# Patient Record
Sex: FEMALE | Race: WHITE
[De-identification: names, ages, dates, MRNs, and addresses within clinical notes are randomized per-mention and may not be internally consistent; named-entity substitution may affect disease eponyms.]

---

## 2017-03-01 ENCOUNTER — APPOINTMENT (OUTPATIENT)
Dept: RHEUMATOLOGY | Facility: CLINIC | Age: 77
End: 2017-03-01

## 2017-03-01 VITALS
WEIGHT: 148 LBS | BODY MASS INDEX: 23.78 KG/M2 | SYSTOLIC BLOOD PRESSURE: 140 MMHG | HEIGHT: 66 IN | DIASTOLIC BLOOD PRESSURE: 70 MMHG

## 2017-03-01 RX ORDER — DENOSUMAB 60 MG/ML
60 INJECTION SUBCUTANEOUS
Qty: 1 | Refills: 0 | Status: COMPLETED | OUTPATIENT
Start: 2017-03-01

## 2017-03-01 RX ADMIN — DENOSUMAB 0 MG/ML: 60 INJECTION SUBCUTANEOUS at 00:00

## 2017-04-05 ENCOUNTER — APPOINTMENT (OUTPATIENT)
Dept: OBGYN | Facility: CLINIC | Age: 77
End: 2017-04-05

## 2017-04-18 ENCOUNTER — APPOINTMENT (OUTPATIENT)
Dept: OBGYN | Facility: CLINIC | Age: 77
End: 2017-04-18

## 2017-09-08 ENCOUNTER — APPOINTMENT (OUTPATIENT)
Dept: RHEUMATOLOGY | Facility: CLINIC | Age: 77
End: 2017-09-08
Payer: MEDICARE

## 2017-09-08 VITALS
HEIGHT: 66 IN | HEART RATE: 69 BPM | WEIGHT: 153 LBS | SYSTOLIC BLOOD PRESSURE: 148 MMHG | DIASTOLIC BLOOD PRESSURE: 77 MMHG | BODY MASS INDEX: 24.59 KG/M2

## 2017-09-08 PROCEDURE — 96372 THER/PROPH/DIAG INJ SC/IM: CPT

## 2017-09-08 RX ORDER — DENOSUMAB 60 MG/ML
60 INJECTION SUBCUTANEOUS
Qty: 0 | Refills: 0 | Status: COMPLETED | OUTPATIENT
Start: 2017-09-08

## 2017-09-08 RX ADMIN — DENOSUMAB 0 MG/ML: 60 INJECTION SUBCUTANEOUS at 00:00

## 2018-03-16 ENCOUNTER — APPOINTMENT (OUTPATIENT)
Dept: RHEUMATOLOGY | Facility: CLINIC | Age: 78
End: 2018-03-16
Payer: MEDICARE

## 2018-03-16 VITALS
OXYGEN SATURATION: 98 % | HEIGHT: 66 IN | HEART RATE: 68 BPM | DIASTOLIC BLOOD PRESSURE: 82 MMHG | SYSTOLIC BLOOD PRESSURE: 134 MMHG

## 2018-03-16 PROCEDURE — 99214 OFFICE O/P EST MOD 30 MIN: CPT | Mod: 25

## 2018-03-16 PROCEDURE — 96372 THER/PROPH/DIAG INJ SC/IM: CPT

## 2018-03-16 RX ORDER — DENOSUMAB 60 MG/ML
60 INJECTION SUBCUTANEOUS
Qty: 0 | Refills: 0 | Status: COMPLETED | OUTPATIENT
Start: 2018-03-16

## 2018-03-16 RX ORDER — LOSARTAN POTASSIUM 100 MG/1
100 TABLET, FILM COATED ORAL
Qty: 90 | Refills: 0 | Status: ACTIVE | COMMUNITY
Start: 2018-01-27

## 2018-03-16 RX ORDER — RANITIDINE 150 MG/1
150 TABLET ORAL
Qty: 90 | Refills: 0 | Status: ACTIVE | COMMUNITY
Start: 2017-12-20

## 2018-03-16 RX ADMIN — DENOSUMAB 0 MG/ML: 60 INJECTION SUBCUTANEOUS at 00:00

## 2018-03-17 ENCOUNTER — TRANSCRIPTION ENCOUNTER (OUTPATIENT)
Age: 78
End: 2018-03-17

## 2018-04-10 ENCOUNTER — TRANSCRIPTION ENCOUNTER (OUTPATIENT)
Age: 78
End: 2018-04-10

## 2018-05-01 ENCOUNTER — CHART COPY (OUTPATIENT)
Age: 78
End: 2018-05-01

## 2018-05-14 ENCOUNTER — RX RENEWAL (OUTPATIENT)
Age: 78
End: 2018-05-14

## 2018-05-23 ENCOUNTER — FORM ENCOUNTER (OUTPATIENT)
Age: 78
End: 2018-05-23

## 2018-05-24 ENCOUNTER — APPOINTMENT (OUTPATIENT)
Dept: NUCLEAR MEDICINE | Facility: CLINIC | Age: 78
End: 2018-05-24

## 2018-05-24 ENCOUNTER — OUTPATIENT (OUTPATIENT)
Dept: OUTPATIENT SERVICES | Facility: HOSPITAL | Age: 78
LOS: 1 days | End: 2018-05-24
Payer: MEDICARE

## 2018-05-24 DIAGNOSIS — Z00.8 ENCOUNTER FOR OTHER GENERAL EXAMINATION: ICD-10-CM

## 2018-05-24 PROCEDURE — A9552: CPT

## 2018-05-24 PROCEDURE — 78816 PET IMAGE W/CT FULL BODY: CPT

## 2018-05-24 PROCEDURE — 78816 PET IMAGE W/CT FULL BODY: CPT | Mod: 26,PI

## 2018-05-31 NOTE — ASU PATIENT PROFILE, ADULT - PMH
Chronic back pain, unspecified back location, unspecified back pain laterality    Closed compression fracture of second lumbar vertebra, sequela    Hyperlipidemia, unspecified hyperlipidemia type    Hypertension, unspecified type    Spinal stenosis of lumbar region, unspecified whether neurogenic claudication present

## 2018-06-01 ENCOUNTER — RESULT REVIEW (OUTPATIENT)
Age: 78
End: 2018-06-01

## 2018-06-01 ENCOUNTER — OUTPATIENT (OUTPATIENT)
Dept: OUTPATIENT SERVICES | Facility: HOSPITAL | Age: 78
LOS: 1 days | Discharge: HOME | End: 2018-06-01
Payer: MEDICARE

## 2018-06-01 VITALS
DIASTOLIC BLOOD PRESSURE: 52 MMHG | RESPIRATION RATE: 14 BRPM | WEIGHT: 160.94 LBS | HEIGHT: 67 IN | SYSTOLIC BLOOD PRESSURE: 142 MMHG | HEART RATE: 57 BPM | TEMPERATURE: 98 F

## 2018-06-01 VITALS
HEART RATE: 64 BPM | RESPIRATION RATE: 16 BRPM | SYSTOLIC BLOOD PRESSURE: 136 MMHG | TEMPERATURE: 97 F | DIASTOLIC BLOOD PRESSURE: 65 MMHG | OXYGEN SATURATION: 98 %

## 2018-06-01 DIAGNOSIS — Z92.241 PERSONAL HISTORY OF SYSTEMIC STEROID THERAPY: Chronic | ICD-10-CM

## 2018-06-01 DIAGNOSIS — Z98.890 OTHER SPECIFIED POSTPROCEDURAL STATES: Chronic | ICD-10-CM

## 2018-06-01 LAB
INR BLD: 1 RATIO — SIGNIFICANT CHANGE UP (ref 0.88–1.16)
PROTHROM AB SERPL-ACNC: 10.8 SEC — SIGNIFICANT CHANGE UP (ref 9.8–12.7)

## 2018-06-01 PROCEDURE — 93010 ELECTROCARDIOGRAM REPORT: CPT

## 2018-06-01 PROCEDURE — 88172 CYTP DX EVAL FNA 1ST EA SITE: CPT | Mod: 26

## 2018-06-01 PROCEDURE — 20225 BONE BIOPSY TROCAR/NDL DEEP: CPT

## 2018-06-01 PROCEDURE — 88189 FLOWCYTOMETRY/READ 16 & >: CPT

## 2018-06-01 PROCEDURE — 77012 CT SCAN FOR NEEDLE BIOPSY: CPT | Mod: 26

## 2018-06-01 PROCEDURE — 88311 DECALCIFY TISSUE: CPT | Mod: 26

## 2018-06-01 PROCEDURE — 88342 IMHCHEM/IMCYTCHM 1ST ANTB: CPT | Mod: 26

## 2018-06-01 PROCEDURE — 88307 TISSUE EXAM BY PATHOLOGIST: CPT | Mod: 26

## 2018-06-01 PROCEDURE — 88173 CYTOPATH EVAL FNA REPORT: CPT | Mod: 26

## 2018-06-01 RX ORDER — ACETAMINOPHEN 500 MG
1000 TABLET ORAL ONCE
Qty: 0 | Refills: 0 | Status: COMPLETED | OUTPATIENT
Start: 2018-06-01 | End: 2018-06-01

## 2018-06-01 RX ORDER — SODIUM CHLORIDE 9 MG/ML
1000 INJECTION INTRAMUSCULAR; INTRAVENOUS; SUBCUTANEOUS
Qty: 0 | Refills: 0 | Status: DISCONTINUED | OUTPATIENT
Start: 2018-06-01 | End: 2018-06-01

## 2018-06-01 RX ORDER — ONDANSETRON 8 MG/1
4 TABLET, FILM COATED ORAL ONCE
Qty: 0 | Refills: 0 | Status: DISCONTINUED | OUTPATIENT
Start: 2018-06-01 | End: 2018-06-01

## 2018-06-01 RX ORDER — OXYCODONE HYDROCHLORIDE 5 MG/1
5 TABLET ORAL EVERY 4 HOURS
Qty: 0 | Refills: 0 | Status: DISCONTINUED | OUTPATIENT
Start: 2018-06-01 | End: 2018-06-01

## 2018-06-01 RX ORDER — FENTANYL CITRATE 50 UG/ML
50 INJECTION INTRAVENOUS
Qty: 0 | Refills: 0 | Status: DISCONTINUED | OUTPATIENT
Start: 2018-06-01 | End: 2018-06-01

## 2018-06-01 RX ADMIN — Medication 400 MILLIGRAM(S): at 13:46

## 2018-06-01 NOTE — BRIEF OPERATIVE NOTE - OPERATION/FINDINGS
ct guidance, oncontrol coaxial system. two cores. only 5cc marrow aspirate from lesion could be obtained. pt nelia proc well. intraop touchpreps not particularly helpful given sclerotic nature of cores.

## 2018-06-06 LAB — TM INTERPRETATION: SIGNIFICANT CHANGE UP

## 2018-06-07 LAB — SURGICAL PATHOLOGY FINAL REPORT - CH: SIGNIFICANT CHANGE UP

## 2018-06-11 DIAGNOSIS — Z80.3 FAMILY HISTORY OF MALIGNANT NEOPLASM OF BREAST: ICD-10-CM

## 2018-06-11 DIAGNOSIS — R93.7 ABNORMAL FINDINGS ON DIAGNOSTIC IMAGING OF OTHER PARTS OF MUSCULOSKELETAL SYSTEM: ICD-10-CM

## 2018-06-11 DIAGNOSIS — M54.9 DORSALGIA, UNSPECIFIED: ICD-10-CM

## 2018-06-11 DIAGNOSIS — F41.9 ANXIETY DISORDER, UNSPECIFIED: ICD-10-CM

## 2018-06-11 DIAGNOSIS — I10 ESSENTIAL (PRIMARY) HYPERTENSION: ICD-10-CM

## 2018-06-11 DIAGNOSIS — E78.5 HYPERLIPIDEMIA, UNSPECIFIED: ICD-10-CM

## 2018-06-11 DIAGNOSIS — Z87.891 PERSONAL HISTORY OF NICOTINE DEPENDENCE: ICD-10-CM

## 2018-06-11 DIAGNOSIS — M48.061 SPINAL STENOSIS, LUMBAR REGION WITHOUT NEUROGENIC CLAUDICATION: ICD-10-CM

## 2018-06-11 DIAGNOSIS — C90.00 MULTIPLE MYELOMA NOT HAVING ACHIEVED REMISSION: ICD-10-CM

## 2018-06-11 DIAGNOSIS — M89.9 DISORDER OF BONE, UNSPECIFIED: ICD-10-CM

## 2018-06-11 DIAGNOSIS — M85.80 OTHER SPECIFIED DISORDERS OF BONE DENSITY AND STRUCTURE, UNSPECIFIED SITE: ICD-10-CM

## 2018-06-11 DIAGNOSIS — D64.9 ANEMIA, UNSPECIFIED: ICD-10-CM

## 2018-06-14 LAB — CHROM ANALY OVERALL INTERP SPEC-IMP: SIGNIFICANT CHANGE UP

## 2018-07-03 ENCOUNTER — INPATIENT (INPATIENT)
Facility: HOSPITAL | Age: 78
LOS: 3 days | Discharge: NURSING, REHAB OR LONG-TERM CARE | End: 2018-07-07
Attending: INTERNAL MEDICINE | Admitting: INTERNAL MEDICINE
Payer: MEDICARE

## 2018-07-03 VITALS — WEIGHT: 149.91 LBS

## 2018-07-03 DIAGNOSIS — Z98.890 OTHER SPECIFIED POSTPROCEDURAL STATES: Chronic | ICD-10-CM

## 2018-07-03 DIAGNOSIS — Z92.241 PERSONAL HISTORY OF SYSTEMIC STEROID THERAPY: Chronic | ICD-10-CM

## 2018-07-03 PROBLEM — I10 ESSENTIAL (PRIMARY) HYPERTENSION: Chronic | Status: ACTIVE | Noted: 2018-05-31

## 2018-07-03 PROBLEM — E78.5 HYPERLIPIDEMIA, UNSPECIFIED: Chronic | Status: ACTIVE | Noted: 2018-05-31

## 2018-07-03 PROBLEM — M54.9 DORSALGIA, UNSPECIFIED: Chronic | Status: ACTIVE | Noted: 2018-05-31

## 2018-07-03 PROBLEM — S32.020S: Chronic | Status: ACTIVE | Noted: 2018-05-31

## 2018-07-03 PROBLEM — M48.061 SPINAL STENOSIS, LUMBAR REGION WITHOUT NEUROGENIC CLAUDICATION: Chronic | Status: ACTIVE | Noted: 2018-05-31

## 2018-07-03 LAB
ALBUMIN SERPL ELPH-MCNC: 3.6 G/DL — SIGNIFICANT CHANGE UP (ref 3.3–5)
ALP SERPL-CCNC: 114 U/L — SIGNIFICANT CHANGE UP (ref 40–120)
ALT FLD-CCNC: 26 U/L — SIGNIFICANT CHANGE UP (ref 12–78)
ANION GAP SERPL CALC-SCNC: 5 MMOL/L — SIGNIFICANT CHANGE UP (ref 5–17)
APPEARANCE UR: CLEAR — SIGNIFICANT CHANGE UP
AST SERPL-CCNC: 15 U/L — SIGNIFICANT CHANGE UP (ref 15–37)
BACTERIA # UR AUTO: ABNORMAL
BASOPHILS # BLD AUTO: 0.04 K/UL — SIGNIFICANT CHANGE UP (ref 0–0.2)
BASOPHILS NFR BLD AUTO: 0.4 % — SIGNIFICANT CHANGE UP (ref 0–2)
BILIRUB SERPL-MCNC: 0.4 MG/DL — SIGNIFICANT CHANGE UP (ref 0.2–1.2)
BILIRUB UR-MCNC: NEGATIVE — SIGNIFICANT CHANGE UP
BUN SERPL-MCNC: 24 MG/DL — HIGH (ref 7–23)
CALCIUM SERPL-MCNC: 9.1 MG/DL — SIGNIFICANT CHANGE UP (ref 8.5–10.1)
CHLORIDE SERPL-SCNC: 108 MMOL/L — SIGNIFICANT CHANGE UP (ref 96–108)
CO2 SERPL-SCNC: 26 MMOL/L — SIGNIFICANT CHANGE UP (ref 22–31)
COLOR SPEC: YELLOW — SIGNIFICANT CHANGE UP
CREAT SERPL-MCNC: 1.14 MG/DL — SIGNIFICANT CHANGE UP (ref 0.5–1.3)
DIFF PNL FLD: ABNORMAL
EOSINOPHIL # BLD AUTO: 1.01 K/UL — HIGH (ref 0–0.5)
EOSINOPHIL NFR BLD AUTO: 10.3 % — HIGH (ref 0–6)
EPI CELLS # UR: SIGNIFICANT CHANGE UP
GLUCOSE SERPL-MCNC: 87 MG/DL — SIGNIFICANT CHANGE UP (ref 70–99)
GLUCOSE UR QL: NEGATIVE MG/DL — SIGNIFICANT CHANGE UP
HCT VFR BLD CALC: 31.4 % — LOW (ref 34.5–45)
HGB BLD-MCNC: 10.4 G/DL — LOW (ref 11.5–15.5)
IMM GRANULOCYTES NFR BLD AUTO: 0.4 % — SIGNIFICANT CHANGE UP (ref 0–1.5)
KETONES UR-MCNC: NEGATIVE — SIGNIFICANT CHANGE UP
LEUKOCYTE ESTERASE UR-ACNC: ABNORMAL
LYMPHOCYTES # BLD AUTO: 1.83 K/UL — SIGNIFICANT CHANGE UP (ref 1–3.3)
LYMPHOCYTES # BLD AUTO: 18.7 % — SIGNIFICANT CHANGE UP (ref 13–44)
MCHC RBC-ENTMCNC: 31 PG — SIGNIFICANT CHANGE UP (ref 27–34)
MCHC RBC-ENTMCNC: 33.1 GM/DL — SIGNIFICANT CHANGE UP (ref 32–36)
MCV RBC AUTO: 93.5 FL — SIGNIFICANT CHANGE UP (ref 80–100)
MONOCYTES # BLD AUTO: 1.06 K/UL — HIGH (ref 0–0.9)
MONOCYTES NFR BLD AUTO: 10.8 % — SIGNIFICANT CHANGE UP (ref 2–14)
NEUTROPHILS # BLD AUTO: 5.79 K/UL — SIGNIFICANT CHANGE UP (ref 1.8–7.4)
NEUTROPHILS NFR BLD AUTO: 59.4 % — SIGNIFICANT CHANGE UP (ref 43–77)
NITRITE UR-MCNC: POSITIVE
NRBC # BLD: 0 /100 WBCS — SIGNIFICANT CHANGE UP (ref 0–0)
PH UR: 8 — SIGNIFICANT CHANGE UP (ref 5–8)
PLATELET # BLD AUTO: 298 K/UL — SIGNIFICANT CHANGE UP (ref 150–400)
POTASSIUM SERPL-MCNC: 4.5 MMOL/L — SIGNIFICANT CHANGE UP (ref 3.5–5.3)
POTASSIUM SERPL-SCNC: 4.5 MMOL/L — SIGNIFICANT CHANGE UP (ref 3.5–5.3)
PROT SERPL-MCNC: 7.5 GM/DL — SIGNIFICANT CHANGE UP (ref 6–8.3)
PROT UR-MCNC: NEGATIVE MG/DL — SIGNIFICANT CHANGE UP
RBC # BLD: 3.36 M/UL — LOW (ref 3.8–5.2)
RBC # FLD: 13 % — SIGNIFICANT CHANGE UP (ref 10.3–14.5)
RBC CASTS # UR COMP ASSIST: ABNORMAL /HPF (ref 0–4)
SODIUM SERPL-SCNC: 139 MMOL/L — SIGNIFICANT CHANGE UP (ref 135–145)
SP GR SPEC: 1.01 — SIGNIFICANT CHANGE UP (ref 1.01–1.02)
UROBILINOGEN FLD QL: NEGATIVE MG/DL — SIGNIFICANT CHANGE UP
WBC # BLD: 9.77 K/UL — SIGNIFICANT CHANGE UP (ref 3.8–10.5)
WBC # FLD AUTO: 9.77 K/UL — SIGNIFICANT CHANGE UP (ref 3.8–10.5)
WBC UR QL: >50

## 2018-07-03 PROCEDURE — 72192 CT PELVIS W/O DYE: CPT | Mod: 26

## 2018-07-03 PROCEDURE — 93010 ELECTROCARDIOGRAM REPORT: CPT

## 2018-07-03 PROCEDURE — 72148 MRI LUMBAR SPINE W/O DYE: CPT | Mod: 26

## 2018-07-03 PROCEDURE — 73502 X-RAY EXAM HIP UNI 2-3 VIEWS: CPT | Mod: 26

## 2018-07-03 PROCEDURE — 99285 EMERGENCY DEPT VISIT HI MDM: CPT

## 2018-07-03 PROCEDURE — 72131 CT LUMBAR SPINE W/O DYE: CPT | Mod: 26

## 2018-07-03 PROCEDURE — 76377 3D RENDER W/INTRP POSTPROCES: CPT | Mod: 26

## 2018-07-03 PROCEDURE — 73552 X-RAY EXAM OF FEMUR 2/>: CPT | Mod: 26

## 2018-07-03 RX ORDER — SODIUM CHLORIDE 9 MG/ML
1000 INJECTION INTRAMUSCULAR; INTRAVENOUS; SUBCUTANEOUS ONCE
Qty: 0 | Refills: 0 | Status: COMPLETED | OUTPATIENT
Start: 2018-07-03 | End: 2018-07-03

## 2018-07-03 RX ORDER — ACETAMINOPHEN 500 MG
1000 TABLET ORAL ONCE
Qty: 0 | Refills: 0 | Status: COMPLETED | OUTPATIENT
Start: 2018-07-03 | End: 2018-07-03

## 2018-07-03 RX ORDER — CEFTRIAXONE 500 MG/1
1000 INJECTION, POWDER, FOR SOLUTION INTRAMUSCULAR; INTRAVENOUS ONCE
Qty: 0 | Refills: 0 | Status: COMPLETED | OUTPATIENT
Start: 2018-07-03 | End: 2018-07-03

## 2018-07-03 RX ORDER — GABAPENTIN 400 MG/1
0 CAPSULE ORAL
Qty: 0 | Refills: 0 | COMMUNITY

## 2018-07-03 RX ORDER — SODIUM CHLORIDE 9 MG/ML
3 INJECTION INTRAMUSCULAR; INTRAVENOUS; SUBCUTANEOUS EVERY 8 HOURS
Qty: 0 | Refills: 0 | Status: DISCONTINUED | OUTPATIENT
Start: 2018-07-03 | End: 2018-07-03

## 2018-07-03 RX ORDER — MORPHINE SULFATE 50 MG/1
2 CAPSULE, EXTENDED RELEASE ORAL ONCE
Qty: 0 | Refills: 0 | Status: DISCONTINUED | OUTPATIENT
Start: 2018-07-03 | End: 2018-07-03

## 2018-07-03 RX ORDER — RANITIDINE HYDROCHLORIDE 150 MG/1
150 TABLET, FILM COATED ORAL
Qty: 0 | Refills: 0 | COMMUNITY

## 2018-07-03 RX ORDER — ONDANSETRON 8 MG/1
4 TABLET, FILM COATED ORAL ONCE
Qty: 0 | Refills: 0 | Status: COMPLETED | OUTPATIENT
Start: 2018-07-03 | End: 2018-07-03

## 2018-07-03 RX ORDER — DEXAMETHASONE 0.5 MG/5ML
6 ELIXIR ORAL ONCE
Qty: 0 | Refills: 0 | Status: COMPLETED | OUTPATIENT
Start: 2018-07-03 | End: 2018-07-03

## 2018-07-03 RX ORDER — METOPROLOL TARTRATE 50 MG
1 TABLET ORAL
Qty: 0 | Refills: 0 | COMMUNITY

## 2018-07-03 RX ADMIN — SODIUM CHLORIDE 500 MILLILITER(S): 9 INJECTION INTRAMUSCULAR; INTRAVENOUS; SUBCUTANEOUS at 15:13

## 2018-07-03 RX ADMIN — MORPHINE SULFATE 2 MILLIGRAM(S): 50 CAPSULE, EXTENDED RELEASE ORAL at 14:35

## 2018-07-03 RX ADMIN — MORPHINE SULFATE 2 MILLIGRAM(S): 50 CAPSULE, EXTENDED RELEASE ORAL at 14:14

## 2018-07-03 RX ADMIN — ONDANSETRON 4 MILLIGRAM(S): 8 TABLET, FILM COATED ORAL at 14:13

## 2018-07-03 RX ADMIN — CEFTRIAXONE 1000 MILLIGRAM(S): 500 INJECTION, POWDER, FOR SOLUTION INTRAMUSCULAR; INTRAVENOUS at 16:43

## 2018-07-03 RX ADMIN — Medication 400 MILLIGRAM(S): at 15:12

## 2018-07-03 RX ADMIN — Medication 1000 MILLIGRAM(S): at 16:37

## 2018-07-03 RX ADMIN — Medication 6 MILLIGRAM(S): at 14:06

## 2018-07-03 RX ADMIN — SODIUM CHLORIDE 3 MILLILITER(S): 9 INJECTION INTRAMUSCULAR; INTRAVENOUS; SUBCUTANEOUS at 14:06

## 2018-07-03 RX ADMIN — Medication 1 MILLIGRAM(S): at 17:11

## 2018-07-03 NOTE — H&P ADULT - ASSESSMENT
76 y/o F PMHx significant for HTN, hyperlipidemia, chronic back pain 2/2 lumbar spinal stenosis, L2 compression fracture, presents to the ED for further evaluation of inability to ambulate due to severe lower back pain that radiates down the right leg. The patient states that her symptoms worsened acutely last night (7/2). The patient states that her symptoms subside when seated. She states that she is currently being "worked up" for "some suspicious lesions in her pelvis" and is s/p IR bone biopsy 6/1/18. The patient states that her workup thus far has been inconclusive. Additionally the patient states that she has had increased urinary urgency recently. 76 y/o F PMHx significant for HTN, hyperlipidemia, chronic back pain 2/2 lumbar spinal stenosis, L2 compression fracture, presents to the ED for further evaluation of inability to ambulate due to severe lower back pain that radiates down the right leg. The patient states that her symptoms worsened acutely last night (7/2). The patient states that her symptoms subside when seated. She states that she is currently being "worked up" for "some suspicious lesions in her pelvis" and is s/p IR bone biopsy 6/1/18. The patient states that her workup thus far has been inconclusive. Additionally the patient states that she has had increased urinary urgency recently.     #Bilateral sacral insufficiency fractures L>R   #Chronic pubic insufficiency fx  #Subacute fracture of RIGHT transverse process of L5  #Severe canal stenosis at L1-L2 level  #Multilevel degenerative disc disease and spondylosis at L1-2, L2-3, L4-S1    ~admit to Medicine  ~cont. pain management   ~f/u w/ Ortho-Spine  ~PT evaluation    2)HTN  ~cont. Losartan 100mg po daily  ~cont. Metoprolol 25mg po tid    3)Hyperlipidemia  ~cont. Atorvastatin 40mg po qHS    4)Vte ppx  ~cont. Heparin sq

## 2018-07-03 NOTE — H&P ADULT - NSHPOUTPATIENTPROVIDERS_GEN_ALL_CORE
Hematology/Oncology; Dr. Batista   Rheumatology; Dr. Rojas  Cardiology; Dr. Quezada PCP/Cardiology; Dr. Quezada  Hematology/Oncology; Dr. Batista   Rheumatology; Dr. Rojas

## 2018-07-03 NOTE — ED STATDOCS - CONSTITUTIONAL, MLM
normal... well appearing, well nourished, and in no apparent distress. Non ambulatory unless with assistance.

## 2018-07-03 NOTE — ED STATDOCS - ATTENDING CONTRIBUTION TO CARE
I, Radhames Heart MD, personally saw the patient with ACP.  I have personally performed a face to face diagnostic evaluation on this patient.  I have reviewed the ACP note and agree with the history, exam, and plan of care, except as noted.

## 2018-07-03 NOTE — ED ADULT TRIAGE NOTE - CHIEF COMPLAINT QUOTE
Pt c/o back pain that radiates to the buttocks and down the right leg since last night. pt states she has hx of spinal stenosis,  and spinal fractures. pt states she does not want to take narcotics

## 2018-07-03 NOTE — H&P ADULT - NSHPPHYSICALEXAM_GEN_ALL_CORE
Vital Signs Last 24 Hrs  T(C): 36.4 (03 Jul 2018 22:07), Max: 36.9 (03 Jul 2018 12:57)  T(F): 97.6 (03 Jul 2018 22:07), Max: 98.5 (03 Jul 2018 12:57)  HR: 69 (03 Jul 2018 22:07) (60 - 69)  BP: 150/69 (03 Jul 2018 22:07) (150/69 - 176/68)  RR: 16 (03 Jul 2018 22:07) (16 - 18)  SpO2: 100% (03 Jul 2018 22:07) (100% - 100%)

## 2018-07-03 NOTE — ED STATDOCS - CARE PLAN
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

## 2018-07-03 NOTE — ED STATDOCS - MUSCULOSKELETAL, MLM
4/5 B/L LE weakness. Lumbar TTP. Hip TTP. Suprapubic TTP. No saddle anesthesia. 4/5 B/L LE weakness. Lumbar TTP. Hip TTP. Suprapubic TTP. No saddle anesthesia.  DTRs absent.  No sensory deficits in the REBECCA or perianal area.

## 2018-07-03 NOTE — ED STATDOCS - MEDICAL DECISION MAKING DETAILS
78 y/o female with chronic back pain, HTN, HLD, spinal stenosis, pelvic fx with pelvic lesions presents to the ED c/o back pain due to chronic spinal stenosis and pelvic fx. DDx core compression. Plan for MRI Lumbar spine and likely admit. 76 y/o female with chronic back pain, HTN, HLD, spinal stenosis, pelvic fx with pelvic lesions presents to the ED c/o back pain due to chronic spinal stenosis and pelvic fx.  Exam reveals B/L REBECCA weakness. DDx cord compression, sciatica, nerve root compression, DJD, metastatic disease.  Plan for CT then MRI Lumbar spine, steroids, spine consultation, and admit.

## 2018-07-03 NOTE — ED STATDOCS - OBJECTIVE STATEMENT
78 y/o female with chronic back pain, HTN, HLD, spinal stenosis, pelvic fx with pelvic lesions presents to the ED c/o back pain due to chronic spinal stenosis and pelvic fx. Pt has been experiencing increasing need to urinate with retention abd pain. Also c/o of a sciatic like pain radiating down to the right hip. Pt has had MRI and bone biopsy for the pelvic fx and possible lesions. Decreased ambulation worsening over the last week. Pt lives alone and feels like she cannot be at home by herself when she is not able to ambulate. Dr. Gonzales- Onc. Nonsmoker. No Etoh.

## 2018-07-03 NOTE — CONSULT NOTE ADULT - SUBJECTIVE AND OBJECTIVE BOX
77y Female community ambulator presents c/o inability to ambulate due to pain in her back and down her right leg. She has had pain for some time, but recently got worse to the point she cannot walk. She does not have pain while sitting usually however last night she says the pain was the worst ever. She has been using a walker but now is having trouble even using that. Pain also in the right groin area. She was worked up with a IR bone biopsy 6/1/18 for some suspicious lesions in her pelvis and for insufficiency fractures of the sacral ala. She has also been managed by Dr. Isidro and Dr. Rojas (Rheumatology) and sees Dr. Quezada for her hx HTN. Per pt, her bone workup for the lesions were inconclusive. Notably she recalls her mother had Padgets disease. At baseline prior to this shpt was able to walk unassisted and perform gardening. Now she cannot. No trauma recently. Denies numbness/tingling. Denies fever/chills. She wishes to avoid any and all surgery for her back or pelvis.    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, initial encounter  H/O prior ablation treatment  Status post epidural steroid injection  BACK PAIN  Inability to walk    PAST MEDICAL & SURGICAL HISTORY:  Spinal stenosis of lumbar region, unspecified whether neurogenic claudication present  Hypertension, unspecified type  Hyperlipidemia, unspecified hyperlipidemia type  Closed compression fracture of second lumbar vertebra, sequela  Chronic back pain, unspecified back location, unspecified back pain laterality  H/O prior ablation treatment: lumbar area  Status post epidural steroid injection: lumbar    MEDICATIONS  (STANDING):    Allergies    No Known Allergies    Intolerances                            10.4   9.77  )-----------( 298      ( 03 Jul 2018 14:05 )             31.4     03 Jul 2018 14:05    139    |  108    |  24     ----------------------------<  87     4.5     |  26     |  1.14     Ca    9.1        03 Jul 2018 14:05    TPro  7.5    /  Alb  3.6    /  TBili  0.4    /  DBili  x      /  AST  15     /  ALT  26     /  AlkPhos  114    03 Jul 2018 14:05      Vital Signs Last 24 Hrs  T(C): 36.9 (07-03-18 @ 12:57), Max: 36.9 (07-03-18 @ 12:57)  T(F): 98.5 (07-03-18 @ 12:57), Max: 98.5 (07-03-18 @ 12:57)  HR: 60 (07-03-18 @ 12:57) (60 - 60)  BP: 176/68 (07-03-18 @ 12:57) (176/68 - 176/68)  BP(mean): --  RR: 18 (07-03-18 @ 12:57) (18 - 18)  SpO2: 100% (07-03-18 @ 12:57) (100% - 100%)    Imaging:  CT Pelvis Bony Only No Cont  PROCEDURE DATE:  07/03/2018      INTERPRETATION:      CT lumbar spine and pelviswithout IV contrast        Evaluation of the bony pelvis demonstrates progression of heterogeneous   lytic lesion involving the LEFT sacral analog with an underlying   insufficiency fracture that has progressed. No heterogeneous lucent   lesion in the RIGHT sacral area LEFT is noted with a new nondisplaced   insufficiency fracture. There is progression of the heterogeneous   sclerotic/lytic lesion in the RIGHT iliac bone with underlying known   insufficiency fracture. Progression of myelomatous involvement of the   BILATERAL pubic bones extending into the symphysis pubis with underlying   known subacute fractures.      Physical Exam  General: NAD, Alert, Awake and oriented, Able to sit up and lay back without pain  Neurologic: No gross deficits, moving all 4s.  Head: NCAT  Eyes: PERRL  Neck/C-Spine: FAROM.   T/L Spine: No bony TTP, no palpable step-off.    HIPS and PELVIS: Able to SLR both Hips.     RIGHT LE:  Right lateral upper thigh vaguely tender to palpation. No open skin. No deformities or other signs of trauma at hip, knee, lower leg, ankle or foot. Full baseline painless ROM at Hip, Knee, ankle and toes. Negative log-roll and heel strike. No groin or hip  pain on Passive internal or external rotation Able to actively SLR. SILT toes 1-5. + DP/PT pulses palpable with brisk cap refill distally. EHL/FHL/TA/GS intact symmetric bilaterally.    LEFT LE:  No open skin. No deformities or other signs of trauma at hip, knee, lower leg, ankle or foot. Full baseline painless ROM at Hip, Knee, ankle and toes. Negative log-roll and heel strike. No groin or hip  pain on Passive internal or external rotation Able to actively SLR. SILT toes 1-5. + DP/PT pulses palpable with brisk cap refill distally. EHL/FHL/TA/GS intact symmetric bilaterally.    RIGHT UE: No open skin. Full baseline painless ROM at shoulder, elbow, wrist, and . No bony TTP.    LEFT UE: Circular skin lesions x2 appear to be ecchymotic on dorsum of left forearm, approx 1x1cm. No open skin. Full baseline painless ROM at shoulder, elbow, wrist, and . No bony TTP.

## 2018-07-03 NOTE — H&P ADULT - HISTORY OF PRESENT ILLNESS
78 y/o F PMHx significant for HTN, hyperlipidemia, chronic back pain 2/2 lumbar spinal stenosis, L2 compression fracture, presents to the ED for further evaluation of inability to ambulate due to severe lower back pain that radiates down the right leg. The patient states that her symptoms worsened acutely last night (7/2). The patient states that her symptoms subside when seated. She states that she is currently being "worked up" for "some suspicious lesions in her pelvis" and is s/p IR bone biopsy 6/1/18. The patient states that her workup thus far has been inconclusive. Additionally the patient states that she has had increased urinary urgency recently.     CT Pelvis => revealed progression of heterogeneous lytic lesion involving the LEFT sacral analog with an underlying insufficiency fracture that has progressed. No heterogeneous lucent lesion in the RIGHT sacral area LEFT is noted with a new nondisplaced insufficiency fracture. There is progression of the heterogeneous sclerotic/lytic lesion in the RIGHT iliac bone with underlying known insufficiency fracture. 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    CT Lumbar spine: No acute fracture. Subacute fracture of RIGHT transverse process of L5.  Multilevel degenerative disc disease and spondylosis at L1-2, L2-3, L4-S1 with disc bulges are noted at L1-2 through L5-S1 which flatten the ventral thecal sac and narrow the BILATERAL neural foramina. Mild central stenosis noted at L1-2, severe central stenosis at L2-3 and L3-4, moderate central stenosis at L4-5 and L5-S1 on a degenerative basis.    MR LS Spine => 1.  Severe canal stenosis at L1-L2 level secondary to 10 mm retropulsed fragment from chronic compression fracture and associated 10 mm disc bulge.  2.  Degenerative disc disease in lower lumbar spine.   3.  Abnormal bone marrow signal consistent with edema seen in sacrum bilaterally more prominent on the left which is incompletely visualized.  Findings correspond to areas of sclerosis with sacral fractures seen on CT study.    Family Hx of Padget's disease

## 2018-07-03 NOTE — ED STATDOCS - NS_ ATTENDINGSCRIBEDETAILS _ED_A_ED_FT
The scribe's documentation has been prepared under my direction and personally reviewed by me in its entirety.  I confirm that the note above accurately reflects all my work, treatment, procedures, and decision making except where otherwise noted or amended by me.  Radhames Heart M.D.

## 2018-07-03 NOTE — ED ADULT NURSE NOTE - OBJECTIVE STATEMENT
pt presents to ED with chronic lower back pain, pt stats pain worsened in last week with diff ambulating secondary to pain. pt c/o frequent urination and urinary retention at times. afebrile. breathing is even and unlabored. will continue to monitor and reassess

## 2018-07-04 LAB
ALBUMIN SERPL ELPH-MCNC: 3.2 G/DL — LOW (ref 3.3–5)
ALP SERPL-CCNC: 98 U/L — SIGNIFICANT CHANGE UP (ref 40–120)
ALT FLD-CCNC: 18 U/L — SIGNIFICANT CHANGE UP (ref 12–78)
ANION GAP SERPL CALC-SCNC: 6 MMOL/L — SIGNIFICANT CHANGE UP (ref 5–17)
AST SERPL-CCNC: 14 U/L — LOW (ref 15–37)
BASOPHILS # BLD AUTO: 0.02 K/UL — SIGNIFICANT CHANGE UP (ref 0–0.2)
BASOPHILS NFR BLD AUTO: 0.2 % — SIGNIFICANT CHANGE UP (ref 0–2)
BILIRUB SERPL-MCNC: 0.2 MG/DL — SIGNIFICANT CHANGE UP (ref 0.2–1.2)
BUN SERPL-MCNC: 25 MG/DL — HIGH (ref 7–23)
CALCIUM SERPL-MCNC: 8.5 MG/DL — SIGNIFICANT CHANGE UP (ref 8.5–10.1)
CHLORIDE SERPL-SCNC: 110 MMOL/L — HIGH (ref 96–108)
CO2 SERPL-SCNC: 24 MMOL/L — SIGNIFICANT CHANGE UP (ref 22–31)
CREAT SERPL-MCNC: 1.03 MG/DL — SIGNIFICANT CHANGE UP (ref 0.5–1.3)
CULTURE RESULTS: SIGNIFICANT CHANGE UP
EOSINOPHIL # BLD AUTO: 0.1 K/UL — SIGNIFICANT CHANGE UP (ref 0–0.5)
EOSINOPHIL NFR BLD AUTO: 1.2 % — SIGNIFICANT CHANGE UP (ref 0–6)
ERYTHROCYTE [SEDIMENTATION RATE] IN BLOOD: 58 MM/HR — HIGH (ref 0–20)
GLUCOSE SERPL-MCNC: 110 MG/DL — HIGH (ref 70–99)
HCT VFR BLD CALC: 27.5 % — LOW (ref 34.5–45)
HGB BLD-MCNC: 9 G/DL — LOW (ref 11.5–15.5)
IMM GRANULOCYTES NFR BLD AUTO: 0.4 % — SIGNIFICANT CHANGE UP (ref 0–1.5)
LDH SERPL L TO P-CCNC: 202 U/L — SIGNIFICANT CHANGE UP (ref 84–241)
LYMPHOCYTES # BLD AUTO: 1.49 K/UL — SIGNIFICANT CHANGE UP (ref 1–3.3)
LYMPHOCYTES # BLD AUTO: 17.8 % — SIGNIFICANT CHANGE UP (ref 13–44)
MAGNESIUM SERPL-MCNC: 2.1 MG/DL — SIGNIFICANT CHANGE UP (ref 1.6–2.6)
MCHC RBC-ENTMCNC: 30.3 PG — SIGNIFICANT CHANGE UP (ref 27–34)
MCHC RBC-ENTMCNC: 32.7 GM/DL — SIGNIFICANT CHANGE UP (ref 32–36)
MCV RBC AUTO: 92.6 FL — SIGNIFICANT CHANGE UP (ref 80–100)
MONOCYTES # BLD AUTO: 1.35 K/UL — HIGH (ref 0–0.9)
MONOCYTES NFR BLD AUTO: 16.2 % — HIGH (ref 2–14)
NEUTROPHILS # BLD AUTO: 5.36 K/UL — SIGNIFICANT CHANGE UP (ref 1.8–7.4)
NEUTROPHILS NFR BLD AUTO: 64.2 % — SIGNIFICANT CHANGE UP (ref 43–77)
NRBC # BLD: 0 /100 WBCS — SIGNIFICANT CHANGE UP (ref 0–0)
PLATELET # BLD AUTO: 274 K/UL — SIGNIFICANT CHANGE UP (ref 150–400)
POTASSIUM SERPL-MCNC: 4.2 MMOL/L — SIGNIFICANT CHANGE UP (ref 3.5–5.3)
POTASSIUM SERPL-SCNC: 4.2 MMOL/L — SIGNIFICANT CHANGE UP (ref 3.5–5.3)
PROT SERPL-MCNC: 6.5 GM/DL — SIGNIFICANT CHANGE UP (ref 6–8.3)
RBC # BLD: 2.95 M/UL — LOW (ref 3.8–5.2)
RBC # BLD: 2.97 M/UL — LOW (ref 3.8–5.2)
RBC # FLD: 13.2 % — SIGNIFICANT CHANGE UP (ref 10.3–14.5)
RETICS #: 54.9 K/UL — SIGNIFICANT CHANGE UP (ref 25–125)
RETICS/RBC NFR: 1.9 % — SIGNIFICANT CHANGE UP (ref 0.5–2.5)
SODIUM SERPL-SCNC: 140 MMOL/L — SIGNIFICANT CHANGE UP (ref 135–145)
SPECIMEN SOURCE: SIGNIFICANT CHANGE UP
VIT B12 SERPL-MCNC: 671 PG/ML — SIGNIFICANT CHANGE UP (ref 232–1245)
WBC # BLD: 8.35 K/UL — SIGNIFICANT CHANGE UP (ref 3.8–10.5)
WBC # FLD AUTO: 8.35 K/UL — SIGNIFICANT CHANGE UP (ref 3.8–10.5)

## 2018-07-04 RX ORDER — SENNA PLUS 8.6 MG/1
2 TABLET ORAL AT BEDTIME
Qty: 0 | Refills: 0 | Status: DISCONTINUED | OUTPATIENT
Start: 2018-07-04 | End: 2018-07-07

## 2018-07-04 RX ORDER — SERTRALINE 25 MG/1
50 TABLET, FILM COATED ORAL AT BEDTIME
Qty: 0 | Refills: 0 | Status: DISCONTINUED | OUTPATIENT
Start: 2018-07-04 | End: 2018-07-07

## 2018-07-04 RX ORDER — ALPRAZOLAM 0.25 MG
0.5 TABLET ORAL
Qty: 0 | Refills: 0 | Status: DISCONTINUED | OUTPATIENT
Start: 2018-07-04 | End: 2018-07-07

## 2018-07-04 RX ORDER — CEFTRIAXONE 500 MG/1
1000 INJECTION, POWDER, FOR SOLUTION INTRAMUSCULAR; INTRAVENOUS EVERY 24 HOURS
Qty: 0 | Refills: 0 | Status: DISCONTINUED | OUTPATIENT
Start: 2018-07-04 | End: 2018-07-05

## 2018-07-04 RX ORDER — ACETAMINOPHEN 500 MG
650 TABLET ORAL EVERY 6 HOURS
Qty: 0 | Refills: 0 | Status: DISCONTINUED | OUTPATIENT
Start: 2018-07-04 | End: 2018-07-07

## 2018-07-04 RX ORDER — ACETAMINOPHEN 500 MG
650 TABLET ORAL EVERY 6 HOURS
Qty: 0 | Refills: 0 | Status: DISCONTINUED | OUTPATIENT
Start: 2018-07-04 | End: 2018-07-06

## 2018-07-04 RX ORDER — ONDANSETRON 8 MG/1
4 TABLET, FILM COATED ORAL EVERY 6 HOURS
Qty: 0 | Refills: 0 | Status: DISCONTINUED | OUTPATIENT
Start: 2018-07-04 | End: 2018-07-07

## 2018-07-04 RX ORDER — HEPARIN SODIUM 5000 [USP'U]/ML
5000 INJECTION INTRAVENOUS; SUBCUTANEOUS EVERY 8 HOURS
Qty: 0 | Refills: 0 | Status: DISCONTINUED | OUTPATIENT
Start: 2018-07-04 | End: 2018-07-07

## 2018-07-04 RX ORDER — LOSARTAN POTASSIUM 100 MG/1
100 TABLET, FILM COATED ORAL DAILY
Qty: 0 | Refills: 0 | Status: DISCONTINUED | OUTPATIENT
Start: 2018-07-04 | End: 2018-07-05

## 2018-07-04 RX ORDER — DOCUSATE SODIUM 100 MG
100 CAPSULE ORAL THREE TIMES A DAY
Qty: 0 | Refills: 0 | Status: DISCONTINUED | OUTPATIENT
Start: 2018-07-04 | End: 2018-07-07

## 2018-07-04 RX ORDER — GABAPENTIN 400 MG/1
100 CAPSULE ORAL AT BEDTIME
Qty: 0 | Refills: 0 | Status: DISCONTINUED | OUTPATIENT
Start: 2018-07-04 | End: 2018-07-07

## 2018-07-04 RX ORDER — RANITIDINE HYDROCHLORIDE 150 MG/1
150 TABLET, FILM COATED ORAL DAILY
Qty: 0 | Refills: 0 | Status: DISCONTINUED | OUTPATIENT
Start: 2018-07-04 | End: 2018-07-07

## 2018-07-04 RX ORDER — TRAMADOL HYDROCHLORIDE 50 MG/1
50 TABLET ORAL EVERY 6 HOURS
Qty: 0 | Refills: 0 | Status: DISCONTINUED | OUTPATIENT
Start: 2018-07-04 | End: 2018-07-07

## 2018-07-04 RX ORDER — METOPROLOL TARTRATE 50 MG
25 TABLET ORAL THREE TIMES A DAY
Qty: 0 | Refills: 0 | Status: DISCONTINUED | OUTPATIENT
Start: 2018-07-04 | End: 2018-07-05

## 2018-07-04 RX ORDER — ATORVASTATIN CALCIUM 80 MG/1
40 TABLET, FILM COATED ORAL AT BEDTIME
Qty: 0 | Refills: 0 | Status: DISCONTINUED | OUTPATIENT
Start: 2018-07-04 | End: 2018-07-07

## 2018-07-04 RX ORDER — TRAMADOL HYDROCHLORIDE 50 MG/1
50 TABLET ORAL ONCE
Qty: 0 | Refills: 0 | Status: DISCONTINUED | OUTPATIENT
Start: 2018-07-04 | End: 2018-07-04

## 2018-07-04 RX ORDER — CEFTRIAXONE 500 MG/1
1 INJECTION, POWDER, FOR SOLUTION INTRAMUSCULAR; INTRAVENOUS EVERY 24 HOURS
Qty: 0 | Refills: 0 | Status: DISCONTINUED | OUTPATIENT
Start: 2018-07-04 | End: 2018-07-04

## 2018-07-04 RX ADMIN — Medication 25 MILLIGRAM(S): at 13:22

## 2018-07-04 RX ADMIN — CEFTRIAXONE 1000 MILLIGRAM(S): 500 INJECTION, POWDER, FOR SOLUTION INTRAMUSCULAR; INTRAVENOUS at 16:32

## 2018-07-04 RX ADMIN — Medication 25 MILLIGRAM(S): at 05:02

## 2018-07-04 RX ADMIN — Medication 25 MILLIGRAM(S): at 22:22

## 2018-07-04 RX ADMIN — ONDANSETRON 4 MILLIGRAM(S): 8 TABLET, FILM COATED ORAL at 22:31

## 2018-07-04 RX ADMIN — HEPARIN SODIUM 5000 UNIT(S): 5000 INJECTION INTRAVENOUS; SUBCUTANEOUS at 13:21

## 2018-07-04 RX ADMIN — RANITIDINE HYDROCHLORIDE 150 MILLIGRAM(S): 150 TABLET, FILM COATED ORAL at 16:31

## 2018-07-04 RX ADMIN — HEPARIN SODIUM 5000 UNIT(S): 5000 INJECTION INTRAVENOUS; SUBCUTANEOUS at 05:01

## 2018-07-04 RX ADMIN — Medication 0.5 MILLIGRAM(S): at 22:22

## 2018-07-04 RX ADMIN — GABAPENTIN 100 MILLIGRAM(S): 400 CAPSULE ORAL at 22:19

## 2018-07-04 RX ADMIN — TRAMADOL HYDROCHLORIDE 50 MILLIGRAM(S): 50 TABLET ORAL at 16:31

## 2018-07-04 RX ADMIN — HEPARIN SODIUM 5000 UNIT(S): 5000 INJECTION INTRAVENOUS; SUBCUTANEOUS at 22:19

## 2018-07-04 RX ADMIN — ATORVASTATIN CALCIUM 40 MILLIGRAM(S): 80 TABLET, FILM COATED ORAL at 22:19

## 2018-07-04 RX ADMIN — LOSARTAN POTASSIUM 100 MILLIGRAM(S): 100 TABLET, FILM COATED ORAL at 05:02

## 2018-07-04 RX ADMIN — SERTRALINE 50 MILLIGRAM(S): 25 TABLET, FILM COATED ORAL at 23:15

## 2018-07-04 RX ADMIN — Medication 100 MILLIGRAM(S): at 13:21

## 2018-07-04 RX ADMIN — Medication 100 MILLIGRAM(S): at 05:01

## 2018-07-04 RX ADMIN — Medication 0.5 MILLIGRAM(S): at 05:02

## 2018-07-04 RX ADMIN — Medication 100 MILLIGRAM(S): at 22:22

## 2018-07-04 NOTE — CONSULT NOTE ADULT - ASSESSMENT
A/P: 77y Female with Bilateral sacral insufficiency fractures L>R, chronic pubic insufficiency fx and left thigh pain.  Pain control  WBAT  PT  Rolling walker  DVT PE ppx  DDD lumbar spine with pain going down her right leg: Consider spine eval.  FU femur films, bilat  Discussed with Orthopedic Attending Dr. Valdez who is on call, recommends reaching out to Dr. Stallings. Will call and discuss pelvic findings    Spent approx 40 min w pt discussing hx, symptoms, imaging and possible etiologies
A/P: lumbar pain w/sacral insufficiency fractures, chronic L2 fx, subacute L5 TP fx, lumbar stenosis - stable  d/w Dr. Mcdowell:  1. No current indication for spine surgical intervention. Recommend conservative care to include WBAT, PT, pain medication.   2. If multiple myeloma is ultimately ruled out suggest aggressive w/u and treatment of underlying osteoporosis on outpatient basis.  Thank you for the courtesy of this consult request.
76 yo female with back and lower extremity pains secondary to osteoporosis/ factures/ spinal stenosis   Based on recent serology HILARIO, two biopsies ( marrow and bone)  a plasma cell dyscrasia is not apparent   nor any other malignancy at this time    A/P    Repeat serologies  Light chains  Review prior biopsies to ensure samples were adequate  Orthopedic/ spine evaluations  Management osteoporosis  Anemia HILARIO  PT etc

## 2018-07-04 NOTE — PATIENT PROFILE ADULT. - NS TRANSFER PATIENT BELONGINGS
Electronic Device (specify)/Clothing/Cell Phone/PDA (specify)/cellphone, ipad, , wallet, pocketbook, bags, clothes/Jewelry/Money (specify)

## 2018-07-04 NOTE — CONSULT NOTE ADULT - SUBJECTIVE AND OBJECTIVE BOX
HPI:          76 y/o F PMHx significant for HTN, hyperlipidemia, chronic back pain 2/2 lumbar spinal stenosis, L2 compression fracture, presents to the ED for further evaluation of inability to ambulate due to severe lower back pain that radiates down the right leg. The patient states that her symptoms worsened acutely last night (). The patient states that her symptoms subside when seated. She states that she is currently being "worked up" for "some suspicious lesions in her pelvis" and is s/p IR bone biopsy 18. The patient states that her workup thus far has been inconclusive. Additionally the patient states that she has had increased urinary urgency recently.     CT Pelvis => revealed progression of heterogeneous lytic lesion involving the LEFT sacral analog with an underlying insufficiency fracture that has progressed. No heterogeneous lucent lesion in the RIGHT sacral area LEFT is noted with a new nondisplaced insufficiency fracture. There is progression of the heterogeneous sclerotic/lytic lesion in the RIGHT iliac bone with underlying known insufficiency fracture. 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    CT Lumbar spine: No acute fracture. Subacute fracture of RIGHT transverse process of L5.  Multilevel degenerative disc disease and spondylosis at L1-2, L2-3, L4-S1 with disc bulges are noted at L1-2 through L5-S1 which flatten the ventral thecal sac and narrow the BILATERAL neural foramina. Mild central stenosis noted at L1-2, severe central stenosis at L2-3 and L3-4, moderate central stenosis at L4-5 and L5-S1 on a degenerative basis.    MR LS Spine => 1.  Severe canal stenosis at L1-L2 level secondary to 10 mm retropulsed fragment from chronic compression fracture and associated 10 mm disc bulge.  2.  Degenerative disc disease in lower lumbar spine.   3.  Abnormal bone marrow signal consistent with edema seen in sacrum bilaterally more prominent on the left which is incompletely visualized.  Findings correspond to areas of sclerosis with sacral fractures seen on CT study.    Family Hx of Padget's disease (2018 23:54)      PAST MEDICAL & SURGICAL HISTORY:  Spinal stenosis of lumbar region, unspecified whether neurogenic claudication present  Hypertension, unspecified type  Hyperlipidemia, unspecified hyperlipidemia type  Closed compression fracture of second lumbar vertebra, sequela  Chronic back pain, unspecified back location, unspecified back pain laterality  H/O prior ablation treatment: lumbar area  Status post epidural steroid injection: lumbar      MEDICATIONS  (STANDING):  atorvastatin 40 milliGRAM(s) Oral at bedtime  cefTRIAXone   IVPB 1 Gram(s) IV Intermittent every 24 hours  docusate sodium 100 milliGRAM(s) Oral three times a day  gabapentin 100 milliGRAM(s) Oral at bedtime  heparin  Injectable 5000 Unit(s) SubCutaneous every 8 hours  losartan 100 milliGRAM(s) Oral daily  metoprolol tartrate 25 milliGRAM(s) Oral three times a day  ranitidine 150 milliGRAM(s) Oral daily  sertraline 50 milliGRAM(s) Oral at bedtime    MEDICATIONS  (PRN):  acetaminophen   Tablet 650 milliGRAM(s) Oral every 6 hours PRN For Temp greater than 38 C (100.4 F)  acetaminophen   Tablet. 650 milliGRAM(s) Oral every 6 hours PRN Mild Pain (1 - 3)  ALPRAZolam 0.5 milliGRAM(s) Oral two times a day PRN Anxiety  ondansetron Injectable 4 milliGRAM(s) IV Push every 6 hours PRN Nausea  senna 2 Tablet(s) Oral at bedtime PRN Constipation      Allergies    No Known Allergies    Intolerances        SOCIAL HISTORY:    FAMILY HISTORY:  No pertinent family history in first degree relatives      Vital Signs Last 24 Hrs  T(C): 36.9 (2018 05:02), Max: 36.9 (2018 12:57)  T(F): 98.4 (2018 05:02), Max: 98.5 (2018 12:57)  HR: 68 (2018 05:02) (60 - 73)  BP: 142/51 (2018 05:02) (131/60 - 176/68)  BP(mean): --  RR: 18 (2018 05:02) (16 - 18)  SpO2: 99% (2018 05:02) (99% - 100%)      LABS:                        9.0    8.35  )-----------( 274      ( 2018 05:38 )             27.5     07-04    140  |  110<H>  |  25<H>  ----------------------------<  110<H>  4.2   |  24  |  1.03    Ca    8.5      2018 05:38  Mg     2.1     07-    TPro  6.5  /  Alb  3.2<L>  /  TBili  0.2  /  DBili  x   /  AST  14<L>  /  ALT  18  /  AlkPhos  98  07-      Urinalysis Basic - ( 2018 15:55 )    Color: Yellow / Appearance: Clear / S.010 / pH: x  Gluc: x / Ketone: Negative  / Bili: Negative / Urobili: Negative mg/dL   Blood: x / Protein: Negative mg/dL / Nitrite: Positive   Leuk Esterase: Moderate / RBC: 6-10 /HPF / WBC >50   Sq Epi: x / Non Sq Epi: Few / Bacteria: Many        RADIOLOGY & ADDITIONAL STUDIES: HPI:          76 y/o F PMHx with osteoporosis, chronic back pain , lumbar spinal stenosis, L2 compression fracture, seen by Dr Isidro for anemia / osteoporosis / HILARIO for Myeloma was largely unremarkable based on Labs ( although she possibly had a low level M protein ), marrow aspirate/ biopsy and a repeat pelvic bone biopsy by IR;  Although a CT PET suggested pelvic osseous pathology ( sclerotic / lytic changes) a discussion with Radiology confirmed that the radiologic findings could also reflect osteoporosis with fractures/ changes following trauma.   Now  admitted  with severe lower back pain that radiates down the right leg. The patient states that her symptoms worsened acutely last night (). The patient states that her symptoms subside when seated.     CT Pelvis "  progression of heterogeneous lytic lesion"  involving the LEFT sacral analog with an underlying insufficiency fracture that has progressed. No heterogeneous lucent lesion in the RIGHT sacral area LEFT is noted with a new nondisplaced insufficiency fracture. There is progression of the heterogeneous sclerotic/lytic lesion in the RIGHT iliac bone with underlying known insufficiency fracture. 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    CT Lumbar spine: No acute fracture. Subacute fracture of RIGHT transverse process of L5.  Multilevel degenerative disc disease and spondylosis at L1-2, L2-3, L4-S1 with disc bulges are noted at L1-2 through L5-S1 which flatten the ventral thecal sac and narrow the BILATERAL neural foramina. Mild central stenosis noted at L1-2, severe central stenosis at L2-3 and L3-4, moderate central stenosis at L4-5 and L5-S1 on a degenerative basis.    MR LS Spine => 1.  Severe canal stenosis at L1-L2 level secondary to 10 mm retropulsed fragment from chronic compression fracture and associated 10 mm disc bulge.  2.  Degenerative disc disease in lower lumbar spine.   3.  Abnormal bone marrow signal consistent with edema seen in sacrum bilaterally more prominent on the left which is incompletely visualized.  Findings correspond to areas of sclerosis with sacral fractures seen on CT study.    Family Hx of Paget's disease (2018 23:54)    PAST MEDICAL & SURGICAL HISTORY:  Spinal stenosis of lumbar region, unspecified whether neurogenic claudication present  Hypertension, unspecified type  Hyperlipidemia, unspecified hyperlipidemia type  Closed compression fracture of second lumbar vertebra, sequela  Chronic back pain, unspecified back location, unspecified back pain laterality  H/O prior ablation treatment: lumbar area  Status post epidural steroid injection: lumbar      MEDICATIONS  (STANDING):  atorvastatin 40 milliGRAM(s) Oral at bedtime  cefTRIAXone   IVPB 1 Gram(s) IV Intermittent every 24 hours  docusate sodium 100 milliGRAM(s) Oral three times a day  gabapentin 100 milliGRAM(s) Oral at bedtime  heparin  Injectable 5000 Unit(s) SubCutaneous every 8 hours  losartan 100 milliGRAM(s) Oral daily  metoprolol tartrate 25 milliGRAM(s) Oral three times a day  ranitidine 150 milliGRAM(s) Oral daily  sertraline 50 milliGRAM(s) Oral at bedtime    MEDICATIONS  (PRN):  acetaminophen   Tablet 650 milliGRAM(s) Oral every 6 hours PRN For Temp greater than 38 C (100.4 F)  acetaminophen   Tablet. 650 milliGRAM(s) Oral every 6 hours PRN Mild Pain (1 - 3)  ALPRAZolam 0.5 milliGRAM(s) Oral two times a day PRN Anxiety  ondansetron Injectable 4 milliGRAM(s) IV Push every 6 hours PRN Nausea  senna 2 Tablet(s) Oral at bedtime PRN Constipation      Allergies    No Known Allergies    Intolerances        SOCIAL HISTORY: Remote smoker    FAMILY HISTORY:  No pertinent family history in first degree relatives      Vital Signs Last 24 Hrs  T(C): 36.9 (2018 05:02), Max: 36.9 (2018 12:57)  T(F): 98.4 (2018 05:02), Max: 98.5 (2018 12:57)  HR: 68 (2018 05:02) (60 - 73)  BP: 142/51 (2018 05:02) (131/60 - 176/68)  BP(mean): --  RR: 18 (2018 05:02) (16 - 18)  SpO2: 99% (2018 05:02) (99% - 100%)  PHYSICAL EXAM:      Constitutional: Appears comfortable, in  NAD, A/O X 3     Eyes: EOMI, PERRLA ;No icterus    ENMT:  No oral lesions, thrush; pharynx not injected    Neck:  Supple; No masses, lymph nodes, no bruits    Breasts: No masses, rash, axillary nodes    Back: No tenderness     Respiratory:  Clear to A/P, No wheezes, rhonchi, rales. No dullness to percussion    Cardiovascular: Normal rate and rhythm; normal S1 and S2; No murmurs. No gallop    Gastrointestinal: No distension, normal bowl sounds; No tendeness , guarding, rebound;  no masses, no hepatosplenomegaly no fluid wave    Genitourinary: No flank pains, no inguinal adenopathy    Rectal: NA    Extremities:  No phlebitis, no edema    Vascular: No acrocyanosis, no edema    Neurological: Alert and oriented. Cranial nerves II-XII WNL, Upper extremity / lower extremity  strength WNL;  Reflexes WNL, Plantar downgoing ; No cerebellar signs    Skin: No rash, petechiae purpura, ecchymoses    Lymph Nodes: No cervical, supraclavicular, axillary, inguinal adenopathy    Musculoskeletal: No joint swelling    Psychiatric: Alert, oriented, normal affect        LABS:                        9.0    8.35  )-----------( 274      ( 2018 05:38 )             27.5     07-04    140  |  110<H>  |  25<H>  ----------------------------<  110<H>  4.2   |  24  |  1.03    Ca    8.5      2018 05:38  Mg     2.1     07-04    TPro  6.5  /  Alb  3.2<L>  /  TBili  0.2  /  DBili  x   /  AST  14<L>  /  ALT  18  /  AlkPhos  98  07-04      Urinalysis Basic - ( 2018 15:55 )    Color: Yellow / Appearance: Clear / S.010 / pH: x  Gluc: x / Ketone: Negative  / Bili: Negative / Urobili: Negative mg/dL   Blood: x / Protein: Negative mg/dL / Nitrite: Positive   Leuk Esterase: Moderate / RBC: 6-10 /HPF / WBC >50   Sq Epi: x / Non Sq Epi: Few / Bacteria: Many

## 2018-07-04 NOTE — PHYSICAL THERAPY INITIAL EVALUATION ADULT - DIAGNOSIS, PT EVAL
Bilateral sacral insufficiency fractures L>R, Chronic pubic insufficiency fx, Subacute fracture of RIGHT transverse process of L5, Severe canal stenosis at L1-L2 level, Multilevel degenerative disc disease and spondylosis at L1-2, L2-3, L4-S1

## 2018-07-04 NOTE — CONSULT NOTE ADULT - CONSULT REASON
76 yo female with back pain 76 yo female with osteoporosis, spinal stenosis, prior spine fracture admitted for increasing back pains/ RLE pains

## 2018-07-04 NOTE — PHYSICAL THERAPY INITIAL EVALUATION ADULT - PERTINENT HX OF CURRENT PROBLEM, REHAB EVAL
78 y/o F PMHx significant for HTN, hyperlipidemia, chronic back pain 2/2 lumbar spinal stenosis, L2 compression fracture, presents to the ED for further evaluation of inability to ambulate due to severe lower back pain that radiates down the right leg. The patient states that her symptoms worsened acutely last night (7/2). The patient states that her symptoms subside when seated.

## 2018-07-04 NOTE — PHYSICAL THERAPY INITIAL EVALUATION ADULT - LIVES WITH, PROFILE
alone/Pt lives in 2 story home, 1 [address] through [facility], pt has HHA 5 days a week/4 hours

## 2018-07-04 NOTE — CONSULT NOTE ADULT - SUBJECTIVE AND OBJECTIVE BOX
[facility]                                                           Orthopedic Spine Consultation    CHIEF COMPLAINT: lower back and sacral pain    HPI: 77 year old female, history of chronic lower back pain and lumbar stenosis, multiple lumbar ESIs in the past - recently advised against further injections, history of osteoporosis treated in the past with Fosamax but not currently on treatment, currently under w/u to r/o multiple myeloma (no evidence to date). Remote history of fall injury in 2018. Now c/o sudden increased in lower back, right groin, and pelvic pain, unable to ambulate secondary to pain.     CT lumbar/pelvis revealed progression of left sacral sclerotic lesions/fractures, new right sclerotic lesions in sacrum, right iliac bone and symphysis pubis, chronic L2 compression fx. Lumbar MRI confirmed L2 fx is chronic, also with subacute L5 right TP fx, multilevel stenosis severe at L2-3, varying degrees elsewhere, multilevel degenerative changes. Urine analysis positive. Pain currently well-controlled.    Pain (0-10): 8/10    PAST MEDICAL & SURGICAL HISTORY:  Spinal stenosis of lumbar region, unspecified whether neurogenic claudication present  Hypertension, unspecified type  Hyperlipidemia, unspecified hyperlipidemia type  Closed compression fracture of second lumbar vertebra, sequela  Chronic back pain, unspecified back location, unspecified back pain laterality  H/O prior ablation treatment: lumbar area  Status post epidural steroid injection: lumbar      MEDICATIONS  (STANDING):  atorvastatin 40 milliGRAM(s) Oral at bedtime  cefTRIAXone   IVPB 1 Gram(s) IV Intermittent every 24 hours  docusate sodium 100 milliGRAM(s) Oral three times a day  gabapentin 100 milliGRAM(s) Oral at bedtime  heparin  Injectable 5000 Unit(s) SubCutaneous every 8 hours  losartan 100 milliGRAM(s) Oral daily  metoprolol tartrate 25 milliGRAM(s) Oral three times a day  ranitidine 150 milliGRAM(s) Oral daily  sertraline 50 milliGRAM(s) Oral at bedtime    MEDICATIONS  (PRN):  acetaminophen   Tablet 650 milliGRAM(s) Oral every 6 hours PRN For Temp greater than 38 C (100.4 F)  acetaminophen   Tablet. 650 milliGRAM(s) Oral every 6 hours PRN Mild Pain (1 - 3)  ALPRAZolam 0.5 milliGRAM(s) Oral two times a day PRN Anxiety  ondansetron Injectable 4 milliGRAM(s) IV Push every 6 hours PRN Nausea  senna 2 Tablet(s) Oral at bedtime PRN Constipation      Allergies:No Known Allergies or Intolerances    FAMILY HISTORY: No pertinent family history in first degree relatives    SOCIAL HISTORY: lives alone, former smoke (quit >30 years ago)    REVIEW OF SYSTEMS:    CONSTITUTIONAL: No fever, weight loss, or fatigue  HEENT: Normal extraoccular movements,   NECK: See HPI  RESPIRATORY: No cough, wheezing, chills or hemoptysis; No shortness of breath  CARDIOVASCULAR: No chest pain, palpitations, dizziness, or leg swelling  GASTROINTESTINAL: No abdominal or epigastric pain. No nausea, vomiting, or hematemesis; No diarrhea or constipation. No melena or hematochezia.  GENITOURINARY: No dysuria, frequency, hematuria, or incontinence  NEUROLOGICAL: See HPI  SKIN: No itching, burning, rashes, or lesions   LYMPH NODES: No enlarged glands  ENDOCRINE: No heat or cold intolerance; No hair loss  MUSCULOSKELETAL: See HPI  PSYCHIATRIC: No depression, anxiety, mood swings, or difficulty sleeping  HEME/LYMPH: No easy bruising, or bleeding gums      Vital Signs Last 24 Hrs  T(C): 36.9 (2018 05:02), Max: 36.9 (2018 12:57)  T(F): 98.4 (2018 05:02), Max: 98.5 (2018 12:57)  HR: 68 (2018 05:02) (60 - 73)  BP: 142/51 (2018 05:02) (131/60 - 176/68)  BP(mean): --  RR: 18 (2018 05:02) (16 - 18)  SpO2: 99% (2018 05:02) (99% - 100%)  I&O's Detail    2018 07:01  -  2018 07:00  --------------------------------------------------------  IN:    Oral Fluid: 300 mL  Total IN: 300 mL    OUT:  Total OUT: 0 mL    Total NET: 300 mL    LABS:                        9.0    8.35  )-----------( 274      ( 2018 05:38 )             27.5     07-04    140  |  110<H>  |  25<H>  ----------------------------<  110<H>  4.2   |  24  |  1.03    Ca    8.5      2018 05:38  Mg     2.1     -    TPro  6.5  /  Alb  3.2<L>  /  TBili  0.2  /  DBili  x   /  AST  14<L>  /  ALT  18  /  AlkPhos  98  07-      Urinalysis Basic - ( 2018 15:55 )    Color: Yellow / Appearance: Clear / S.010 / pH: x  Gluc: x / Ketone: Negative  / Bili: Negative / Urobili: Negative mg/dL   Blood: x / Protein: Negative mg/dL / Nitrite: Positive   Leuk Esterase: Moderate / RBC: 6-10 /HPF / WBC >50   Sq Epi: x / Non Sq Epi: Few / Bacteria: Many        RADIOLOGY & ADDITIONAL STUDIES:    EXAM:  MR SPINE LUMBAR                        PROCEDURE DATE:  2018      INTERPRETATION:      EXAM:    MR Lumbar Spine Without Intravenous Contrast    CLINICAL HISTORY:    77 years old, female; Pain; Lumbago with sciatica; Bilateral; Patient   HX: Le   weakness, urinary retention, R/O cord compression.    TECHNIQUE:    Magnetic resonance images of the lumbar spine without intravenous   contrast in   multiple planes.    COMPARISON:    CT LUMBAR SPINE 2018 14:39    FINDINGS:   Vertebrae:  Moderate loss of vertebral body height at L2 level which   appears   chronic.    Marrow:  Abnormal bone marrow signal consistent with edema seen in   sacrum   bilaterally more prominent on the left which is incompletely visualized.    Spinal cord:  Unremarkable.  Normal signal.    Soft tissues:  Unremarkable.     DISCS/SPINAL CANAL/NEURAL FORAMINA:    L1-L2:  6 mm osteophytosis and bulge with mild to moderate canal   stenosis.    L2-L3:  10 mm retropulsed disc osteophyte and disc bulge which appears   chronic secondary to compression fracture. Mild bilateral facet   arthropathy.   Severe canal stenosis seen at this level which appears chronic secondary   to   Disc/ridge complex, facet and ligamentous hypertrophy.    L3-L4:  Diffuse bulge measuring up to 8 mm and left neural foraminal   and   lateral disc and 6 mm in the central posterior and right side. Moderate   canal   stenosis. Mild bilateral facet arthropathy. Moderate bilateral neural   foraminal   narrowing.    L4-L5:Degenerative disc disease with fusion of the disc space. 4 mm   posterior disc osteophytosis. Mild to moderate bilateral neuroforaminal   narrowing..    L5-S1:  Degenerative disc disease with loss of disc height with   degenerative   endplate changes. 6 mm posterior osteophytosis and bulge. Bilateral facet   arthropathy. Mild canal stenosis. Moderate bilateral neural foraminal   narrowing   secondary to loss of disc height.    Multiple BILATERAL renal cysts are noted.    IMPRESSION:       1.  Severe canal stenosis at L1-L2 level secondary to 10 mm retropulsed   fragment from chronic compression fracture and associated 10 mm disc   bulge.  2.  Degenerative disc disease in lower lumbar spine as described above   which   appear chronic.  3.  Abnormal bone marrow signal consistent with edema seen in sacrum   bilaterally more prominent on the left which is incompletely visualized.    Findings correspond to areas of sclerosis with sacral fractures seen on   CT   study.    LANCE ANDREWS M.D., ATTENDING RADIOLOGIST  This document has been electronically signed. 2018  8:57AM      EXAM:  CT PELVIS BONY ONLY                          EXAM:  CT 3D RECONSTRUCT W BETH                          EXAM:  CT LUMBAR SPINE                          PROCEDURE DATE:  2018      INTERPRETATION:      CT lumbar spine and pelviswithout IV contrast        CLINICAL INFORMATION: Severe low back pain,  spinal stenosis,   spondylosis. HISTORY of multiple myeloma and multiple fractures    TECHNIQUE:  Contiguous axial 2 mm sections were obtained through the   lumbar spine using a single helical acquisition.   Additional 2 mm   sagittal and coronal reconstructions of the spine were obtained. These   additional reformatted images were used to evaluate the spine for   alignment, vertebral fractures and the integrity of the the posterior   elements.  This scan was performed using automatic exposure control   (radiation dose reduction software) to obtain a diagnostic image quality   scan with patient dose as low as reasonably achievable.        FINDINGS:   PET CT dated 2018 available for review    Evaluation of the bony pelvis demonstrates progression of heterogeneous   lytic lesion involving the LEFT sacral analog with an underlying   insufficiency fracture that has progressed. No heterogeneous lucent   lesion in the RIGHT sacral area LEFT is noted with a new nondisplaced   insufficiency fracture. There is progression of the heterogeneous   sclerotic/lytic lesion in the RIGHT iliac bone with underlying known   insufficiency fracture. Progression of myelomatous involvement of the   BILATERAL pubic bones extending into the symphysis pubis with underlying   known subacute fractures.    Bilateral renal cysts are noted. Marked stool retention is present.   Bilateral inguinal hernias are noted containing fluid on the LEFT found   on the RIGHT. Uterine calcification.    Lumbar vertebral body heights show a chronic compression deformity of L2.   No acute vertebral fracture is seen. There is an unchanged subacute   fractures through the RIGHT transverse process of L5. No destructive bone   lesion is found.  Alignment is preserved.  Facet joints appear aligned.      Lumbar intervertebral disc spaces show multilevel degenerative disc   disease and spondylosis at L1-2, L2-3, L4-S1 with loss of disc height and   associated degenerative endplate changes. Disc bulges are noted at L1-2   through L5-S1 which flatten the ventral thecal sac and narrow the   BILATERAL neural foramina. Mild central stenosis noted at L1-2, severe   central stenosis at L2-3 and L3-4, moderate central stenosis at L4-5 and   L5-S1 on a degenerative basis due to disc bulge, facet osteophytic   hypertrophy and redundancy of ligamentum flavum.    No paraspinal mass is recognized.  Paraspinal soft tissues appear intact.     IMPRESSION:    Pelvis: progression of heterogeneous lytic lesion involving the LEFT   sacral analog with an underlying insufficiency fracture that has   progressed. No heterogeneous lucent lesion in the RIGHT sacral area LEFT   is noted with a new nondisplaced insufficiency fracture. There is   progression of the heterogeneous sclerotic/lytic lesion in the RIGHT   iliac bone with underlying known insufficiency fracture. Progression of   myelomatous involvement of the BILATERAL pubic bones extending into the  symphysis pubis with underlying known subacute fractures.    Lumbar spine: No acute fracture. Subacute fracture of RIGHT transverse   process of L5.  Multilevel degenerative disc disease and spondylosis at   L1-2, L2-3, L4-S1 with disc bulges arenoted at L1-2 through L5-S1 which   flatten the ventral thecal sac and narrow the BILATERAL neural foramina.   Mild central stenosis noted at L1-2, severe central stenosis at L2-3 and   L3-4, moderate central stenosis at L4-5 and L5-S1 on a degenerative basis.    LANCE ANDREWS M.D., ATTENDING RADIOLOGIST  This document has been electronically signed. Jul  3 2018  5:07PM      PHYSICAL EXAM:  Constitutional: NAD, well-groomed, well-developed  HEENT: PERRLA, EOMI, Normal Hearing, MMM  Respiratory: CTAB  Cardiovascular: S1 and S2, RRR, no M/G/R  Gastrointestinal: BS+, soft, NT/ND  Extremities: moves all extremities freely, no c/c/e  Vascular: 2+ peripheral pulses  Psychiatric: Normal mood, normal affect  Skin: No rashes  Spine: tenderness on palpation of lumbar spinous processes, paraspinal muscles, and sacral area c/w known fractures      SLR negative bilaterally  Cervical ROM: wnl  Lumbar ROM: not tested  Neurological: A/O x 3              Sensation: [x] intact to light touch  [ ] decreased:          Motor exam: [x]          [x] Upper extremity    Delt      Bicp       Tri      Wrist ext  Wrst Flex       Digit Ext Digit Flex                              R         5/5        5/5        5/5       5/5        5/5            5/5       5/5          5/5                              L          5/5        5/5        5/5       5/5        5/5            5/5       5/5          5/5         [x] Lower ext.     Hip Flx  Hip Ext   Hip Abd  Hip Add Quad   Hamstrg   TA       EHL      GS                         R        5/5        5/5        5/5        5/5        5/5        5/5        5/5     5/5      5/5                         L         5/5        5/5        5/5        5/5        5/5        5/5        5/5     5/5      5/5                                                        [x] Vascular: intact           Tension Signs: none          Long Tract Findings: none

## 2018-07-04 NOTE — PHYSICAL THERAPY INITIAL EVALUATION ADULT - LEVEL OF INDEPENDENCE: SUPINE/SIT, REHAB EVAL
Pt toleratesd dangling at EOB well, roughly 7 minutes. pt reports pain reduction sitting up./minimum assist (75% patients effort)

## 2018-07-05 LAB
% ALBUMIN: 56.2 % — SIGNIFICANT CHANGE UP
% ALPHA 1: 5.5 % — SIGNIFICANT CHANGE UP
% ALPHA 2: 14.5 % — SIGNIFICANT CHANGE UP
% BETA: 11.7 % — SIGNIFICANT CHANGE UP
% GAMMA: 12.1 % — SIGNIFICANT CHANGE UP
ALBUMIN SERPL ELPH-MCNC: 3.4 G/DL — LOW (ref 3.6–5.5)
ALBUMIN/GLOB SERPL ELPH: 1.3 RATIO — SIGNIFICANT CHANGE UP
ALPHA1 GLOB SERPL ELPH-MCNC: 0.3 G/DL — SIGNIFICANT CHANGE UP (ref 0.1–0.4)
ALPHA2 GLOB SERPL ELPH-MCNC: 0.9 G/DL — SIGNIFICANT CHANGE UP (ref 0.5–1)
ANION GAP SERPL CALC-SCNC: 6 MMOL/L — SIGNIFICANT CHANGE UP (ref 5–17)
B-GLOBULIN SERPL ELPH-MCNC: 0.7 G/DL — SIGNIFICANT CHANGE UP (ref 0.5–1)
BUN SERPL-MCNC: 35 MG/DL — HIGH (ref 7–23)
CALCIUM SERPL-MCNC: 8.7 MG/DL — SIGNIFICANT CHANGE UP (ref 8.5–10.1)
CHLORIDE SERPL-SCNC: 107 MMOL/L — SIGNIFICANT CHANGE UP (ref 96–108)
CO2 SERPL-SCNC: 25 MMOL/L — SIGNIFICANT CHANGE UP (ref 22–31)
CREAT SERPL-MCNC: 1.34 MG/DL — HIGH (ref 0.5–1.3)
FERRITIN SERPL-MCNC: 22 NG/ML — SIGNIFICANT CHANGE UP (ref 15–150)
GAMMA GLOBULIN: 0.7 G/DL — SIGNIFICANT CHANGE UP (ref 0.6–1.6)
GLUCOSE SERPL-MCNC: 94 MG/DL — SIGNIFICANT CHANGE UP (ref 70–99)
HCT VFR BLD CALC: 28.4 % — LOW (ref 34.5–45)
HGB BLD-MCNC: 9.2 G/DL — LOW (ref 11.5–15.5)
IGA FLD-MCNC: 85 MG/DL — SIGNIFICANT CHANGE UP (ref 84–499)
IGG FLD-MCNC: 688 MG/DL — SIGNIFICANT CHANGE UP (ref 610–1660)
IGM SERPL-MCNC: 130 MG/DL — SIGNIFICANT CHANGE UP (ref 35–242)
INTERPRETATION SERPL IFE-IMP: SIGNIFICANT CHANGE UP
IRON SATN MFR SERPL: 25 % — SIGNIFICANT CHANGE UP (ref 14–50)
IRON SATN MFR SERPL: 79 UG/DL — SIGNIFICANT CHANGE UP (ref 30–160)
KAPPA LC SER QL IFE: 1.9 MG/DL — SIGNIFICANT CHANGE UP (ref 0.33–1.94)
KAPPA LC SER QL IFE: 1.9 MG/DL — SIGNIFICANT CHANGE UP (ref 0.33–1.94)
KAPPA/LAMBDA FREE LIGHT CHAIN RATIO, SERUM: 0.87 RATIO — SIGNIFICANT CHANGE UP (ref 0.26–1.65)
KAPPA/LAMBDA FREE LIGHT CHAIN RATIO, SERUM: 0.87 RATIO — SIGNIFICANT CHANGE UP (ref 0.26–1.65)
LAMBDA LC SER QL IFE: 2.18 MG/DL — SIGNIFICANT CHANGE UP (ref 0.57–2.63)
LAMBDA LC SER QL IFE: 2.18 MG/DL — SIGNIFICANT CHANGE UP (ref 0.57–2.63)
MCHC RBC-ENTMCNC: 30.1 PG — SIGNIFICANT CHANGE UP (ref 27–34)
MCHC RBC-ENTMCNC: 32.4 GM/DL — SIGNIFICANT CHANGE UP (ref 32–36)
MCV RBC AUTO: 92.8 FL — SIGNIFICANT CHANGE UP (ref 80–100)
NRBC # BLD: 0 /100 WBCS — SIGNIFICANT CHANGE UP (ref 0–0)
PLATELET # BLD AUTO: 282 K/UL — SIGNIFICANT CHANGE UP (ref 150–400)
POTASSIUM SERPL-MCNC: 5.1 MMOL/L — SIGNIFICANT CHANGE UP (ref 3.5–5.3)
POTASSIUM SERPL-SCNC: 5.1 MMOL/L — SIGNIFICANT CHANGE UP (ref 3.5–5.3)
PROT PATTERN SERPL ELPH-IMP: SIGNIFICANT CHANGE UP
PROT SERPL-MCNC: 6.1 G/DL — SIGNIFICANT CHANGE UP (ref 6–8.3)
PROT SERPL-MCNC: 6.1 G/DL — SIGNIFICANT CHANGE UP (ref 6–8.3)
RBC # BLD: 3.06 M/UL — LOW (ref 3.8–5.2)
RBC # FLD: 13.3 % — SIGNIFICANT CHANGE UP (ref 10.3–14.5)
SODIUM SERPL-SCNC: 138 MMOL/L — SIGNIFICANT CHANGE UP (ref 135–145)
TIBC SERPL-MCNC: 322 UG/DL — SIGNIFICANT CHANGE UP (ref 220–430)
UIBC SERPL-MCNC: 243 UG/DL — SIGNIFICANT CHANGE UP (ref 110–370)
WBC # BLD: 9.04 K/UL — SIGNIFICANT CHANGE UP (ref 3.8–10.5)
WBC # FLD AUTO: 9.04 K/UL — SIGNIFICANT CHANGE UP (ref 3.8–10.5)

## 2018-07-05 RX ORDER — METOPROLOL TARTRATE 50 MG
25 TABLET ORAL
Qty: 0 | Refills: 0 | Status: DISCONTINUED | OUTPATIENT
Start: 2018-07-05 | End: 2018-07-07

## 2018-07-05 RX ADMIN — Medication 25 MILLIGRAM(S): at 18:15

## 2018-07-05 RX ADMIN — HEPARIN SODIUM 5000 UNIT(S): 5000 INJECTION INTRAVENOUS; SUBCUTANEOUS at 13:48

## 2018-07-05 RX ADMIN — SERTRALINE 50 MILLIGRAM(S): 25 TABLET, FILM COATED ORAL at 22:43

## 2018-07-05 RX ADMIN — Medication 100 MILLIGRAM(S): at 13:48

## 2018-07-05 RX ADMIN — Medication 100 MILLIGRAM(S): at 22:43

## 2018-07-05 RX ADMIN — TRAMADOL HYDROCHLORIDE 50 MILLIGRAM(S): 50 TABLET ORAL at 05:45

## 2018-07-05 RX ADMIN — RANITIDINE HYDROCHLORIDE 150 MILLIGRAM(S): 150 TABLET, FILM COATED ORAL at 18:15

## 2018-07-05 RX ADMIN — HEPARIN SODIUM 5000 UNIT(S): 5000 INJECTION INTRAVENOUS; SUBCUTANEOUS at 05:07

## 2018-07-05 RX ADMIN — Medication 25 MILLIGRAM(S): at 05:06

## 2018-07-05 RX ADMIN — ATORVASTATIN CALCIUM 40 MILLIGRAM(S): 80 TABLET, FILM COATED ORAL at 22:43

## 2018-07-05 RX ADMIN — HEPARIN SODIUM 5000 UNIT(S): 5000 INJECTION INTRAVENOUS; SUBCUTANEOUS at 22:43

## 2018-07-05 RX ADMIN — TRAMADOL HYDROCHLORIDE 50 MILLIGRAM(S): 50 TABLET ORAL at 05:03

## 2018-07-05 RX ADMIN — Medication 100 MILLIGRAM(S): at 05:03

## 2018-07-05 RX ADMIN — GABAPENTIN 100 MILLIGRAM(S): 400 CAPSULE ORAL at 22:43

## 2018-07-05 RX ADMIN — LOSARTAN POTASSIUM 100 MILLIGRAM(S): 100 TABLET, FILM COATED ORAL at 05:06

## 2018-07-06 ENCOUNTER — RESULT REVIEW (OUTPATIENT)
Age: 78
End: 2018-07-06

## 2018-07-06 ENCOUNTER — TRANSCRIPTION ENCOUNTER (OUTPATIENT)
Age: 78
End: 2018-07-06

## 2018-07-06 LAB
ANION GAP SERPL CALC-SCNC: 7 MMOL/L — SIGNIFICANT CHANGE UP (ref 5–17)
APPEARANCE UR: CLEAR — SIGNIFICANT CHANGE UP
BACTERIA # UR AUTO: ABNORMAL
BILIRUB UR-MCNC: NEGATIVE — SIGNIFICANT CHANGE UP
BUN SERPL-MCNC: 43 MG/DL — HIGH (ref 7–23)
CALCIUM SERPL-MCNC: 9.1 MG/DL — SIGNIFICANT CHANGE UP (ref 8.5–10.1)
CHLORIDE SERPL-SCNC: 109 MMOL/L — HIGH (ref 96–108)
CO2 SERPL-SCNC: 25 MMOL/L — SIGNIFICANT CHANGE UP (ref 22–31)
COLOR SPEC: YELLOW — SIGNIFICANT CHANGE UP
CREAT SERPL-MCNC: 1.24 MG/DL — SIGNIFICANT CHANGE UP (ref 0.5–1.3)
CRP SERPL-MCNC: 0.2 MG/DL — SIGNIFICANT CHANGE UP (ref 0–0.4)
DIFF PNL FLD: NEGATIVE — SIGNIFICANT CHANGE UP
EPI CELLS # UR: SIGNIFICANT CHANGE UP
GLUCOSE SERPL-MCNC: 83 MG/DL — SIGNIFICANT CHANGE UP (ref 70–99)
GLUCOSE UR QL: NEGATIVE MG/DL — SIGNIFICANT CHANGE UP
HCT VFR BLD CALC: 28.5 % — LOW (ref 34.5–45)
HGB BLD-MCNC: 9.3 G/DL — LOW (ref 11.5–15.5)
INR BLD: 1.02 RATIO — SIGNIFICANT CHANGE UP (ref 0.88–1.16)
INTERPRETATION 24H UR IFE-IMP: SIGNIFICANT CHANGE UP
KETONES UR-MCNC: NEGATIVE — SIGNIFICANT CHANGE UP
LEUKOCYTE ESTERASE UR-ACNC: ABNORMAL
MCHC RBC-ENTMCNC: 30.5 PG — SIGNIFICANT CHANGE UP (ref 27–34)
MCHC RBC-ENTMCNC: 32.6 GM/DL — SIGNIFICANT CHANGE UP (ref 32–36)
MCV RBC AUTO: 93.4 FL — SIGNIFICANT CHANGE UP (ref 80–100)
NITRITE UR-MCNC: NEGATIVE — SIGNIFICANT CHANGE UP
NRBC # BLD: 0 /100 WBCS — SIGNIFICANT CHANGE UP (ref 0–0)
PH UR: 6 — SIGNIFICANT CHANGE UP (ref 5–8)
PLATELET # BLD AUTO: 244 K/UL — SIGNIFICANT CHANGE UP (ref 150–400)
POTASSIUM SERPL-MCNC: 5 MMOL/L — SIGNIFICANT CHANGE UP (ref 3.5–5.3)
POTASSIUM SERPL-SCNC: 5 MMOL/L — SIGNIFICANT CHANGE UP (ref 3.5–5.3)
PROT ?TM UR-MCNC: 14 MG/DL — HIGH (ref 0–12)
PROT UR-MCNC: NEGATIVE MG/DL — SIGNIFICANT CHANGE UP
PROTHROM AB SERPL-ACNC: 11 SEC — SIGNIFICANT CHANGE UP (ref 9.8–12.7)
RBC # BLD: 3.05 M/UL — LOW (ref 3.8–5.2)
RBC # FLD: 13.2 % — SIGNIFICANT CHANGE UP (ref 10.3–14.5)
RBC CASTS # UR COMP ASSIST: NEGATIVE /HPF — SIGNIFICANT CHANGE UP (ref 0–4)
SODIUM SERPL-SCNC: 141 MMOL/L — SIGNIFICANT CHANGE UP (ref 135–145)
SP GR SPEC: 1.01 — SIGNIFICANT CHANGE UP (ref 1.01–1.02)
UROBILINOGEN FLD QL: NEGATIVE MG/DL — SIGNIFICANT CHANGE UP
WBC # BLD: 8.49 K/UL — SIGNIFICANT CHANGE UP (ref 3.8–10.5)
WBC # FLD AUTO: 8.49 K/UL — SIGNIFICANT CHANGE UP (ref 3.8–10.5)
WBC UR QL: ABNORMAL

## 2018-07-06 PROCEDURE — 88311 DECALCIFY TISSUE: CPT | Mod: 26

## 2018-07-06 PROCEDURE — 88173 CYTOPATH EVAL FNA REPORT: CPT | Mod: 26

## 2018-07-06 PROCEDURE — 88307 TISSUE EXAM BY PATHOLOGIST: CPT | Mod: 26

## 2018-07-06 PROCEDURE — 20225 BONE BIOPSY TROCAR/NDL DEEP: CPT

## 2018-07-06 PROCEDURE — 88172 CYTP DX EVAL FNA 1ST EA SITE: CPT | Mod: 26

## 2018-07-06 RX ORDER — METOPROLOL TARTRATE 50 MG
1 TABLET ORAL
Qty: 0 | Refills: 0 | COMMUNITY

## 2018-07-06 RX ORDER — TRAMADOL HYDROCHLORIDE 50 MG/1
1 TABLET ORAL
Qty: 0 | Refills: 0 | DISCHARGE
Start: 2018-07-06

## 2018-07-06 RX ORDER — ACETAMINOPHEN 500 MG
2 TABLET ORAL
Qty: 0 | Refills: 0 | DISCHARGE
Start: 2018-07-06

## 2018-07-06 RX ORDER — OXYCODONE HYDROCHLORIDE 5 MG/1
5 TABLET ORAL EVERY 4 HOURS
Qty: 0 | Refills: 0 | Status: DISCONTINUED | OUTPATIENT
Start: 2018-07-06 | End: 2018-07-06

## 2018-07-06 RX ORDER — LOSARTAN POTASSIUM 100 MG/1
1 TABLET, FILM COATED ORAL
Qty: 0 | Refills: 0 | COMMUNITY

## 2018-07-06 RX ORDER — POLYETHYLENE GLYCOL 3350 17 G/17G
17 POWDER, FOR SOLUTION ORAL DAILY
Qty: 0 | Refills: 0 | Status: DISCONTINUED | OUTPATIENT
Start: 2018-07-06 | End: 2018-07-07

## 2018-07-06 RX ORDER — ACETAMINOPHEN 500 MG
1000 TABLET ORAL EVERY 8 HOURS
Qty: 0 | Refills: 0 | Status: DISCONTINUED | OUTPATIENT
Start: 2018-07-06 | End: 2018-07-07

## 2018-07-06 RX ORDER — SODIUM CHLORIDE 9 MG/ML
1000 INJECTION INTRAMUSCULAR; INTRAVENOUS; SUBCUTANEOUS
Qty: 0 | Refills: 0 | Status: DISCONTINUED | OUTPATIENT
Start: 2018-07-06 | End: 2018-07-06

## 2018-07-06 RX ORDER — DENOSUMAB 60 MG/ML
0 INJECTION SUBCUTANEOUS
Qty: 0 | Refills: 0 | COMMUNITY

## 2018-07-06 RX ORDER — ONDANSETRON 8 MG/1
4 TABLET, FILM COATED ORAL EVERY 4 HOURS
Qty: 0 | Refills: 0 | Status: DISCONTINUED | OUTPATIENT
Start: 2018-07-06 | End: 2018-07-06

## 2018-07-06 RX ORDER — FENTANYL CITRATE 50 UG/ML
50 INJECTION INTRAVENOUS
Qty: 0 | Refills: 0 | Status: DISCONTINUED | OUTPATIENT
Start: 2018-07-06 | End: 2018-07-06

## 2018-07-06 RX ADMIN — HEPARIN SODIUM 5000 UNIT(S): 5000 INJECTION INTRAVENOUS; SUBCUTANEOUS at 21:24

## 2018-07-06 RX ADMIN — SERTRALINE 50 MILLIGRAM(S): 25 TABLET, FILM COATED ORAL at 21:24

## 2018-07-06 RX ADMIN — Medication 25 MILLIGRAM(S): at 05:16

## 2018-07-06 RX ADMIN — Medication 100 MILLIGRAM(S): at 05:16

## 2018-07-06 RX ADMIN — POLYETHYLENE GLYCOL 3350 17 GRAM(S): 17 POWDER, FOR SOLUTION ORAL at 16:55

## 2018-07-06 RX ADMIN — Medication 1000 MILLIGRAM(S): at 22:00

## 2018-07-06 RX ADMIN — RANITIDINE HYDROCHLORIDE 150 MILLIGRAM(S): 150 TABLET, FILM COATED ORAL at 16:55

## 2018-07-06 RX ADMIN — Medication 100 MILLIGRAM(S): at 16:55

## 2018-07-06 RX ADMIN — HEPARIN SODIUM 5000 UNIT(S): 5000 INJECTION INTRAVENOUS; SUBCUTANEOUS at 05:16

## 2018-07-06 RX ADMIN — Medication 0.5 MILLIGRAM(S): at 21:35

## 2018-07-06 RX ADMIN — Medication 650 MILLIGRAM(S): at 17:12

## 2018-07-06 RX ADMIN — Medication 1000 MILLIGRAM(S): at 21:24

## 2018-07-06 RX ADMIN — ATORVASTATIN CALCIUM 40 MILLIGRAM(S): 80 TABLET, FILM COATED ORAL at 21:24

## 2018-07-06 RX ADMIN — GABAPENTIN 100 MILLIGRAM(S): 400 CAPSULE ORAL at 21:24

## 2018-07-06 RX ADMIN — Medication 100 MILLIGRAM(S): at 21:24

## 2018-07-06 RX ADMIN — Medication 650 MILLIGRAM(S): at 06:01

## 2018-07-06 RX ADMIN — Medication 0.5 MILLIGRAM(S): at 06:00

## 2018-07-06 RX ADMIN — Medication 25 MILLIGRAM(S): at 17:11

## 2018-07-06 NOTE — DISCHARGE NOTE ADULT - CARE PLAN
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  Assessment and plan of treatment:	Follow up with Dr. Batista for bone biopsy results and plans moving forward.   - Physical therapy for mobilization and pain control.  Secondary Diagnosis:	Hypertension, unspecified type  Goal:	Good blood pressure control  Assessment and plan of treatment:	STOP Cozaar and Amlodipine as blood pressure was low normal.   - continue Metoprolol but decrease frequency to 2x / day.

## 2018-07-06 NOTE — DISCHARGE NOTE ADULT - PATIENT PORTAL LINK FT
You can access the [facility] Portal, offered by [facility], by registering with the following website: [email or web address]

## 2018-07-06 NOTE — DISCHARGE NOTE ADULT - CARE PROVIDER_API CALL
Nilson Garland), Medical Oncology  [address] [place] [address]  Phone: [phone]  Fax: [phone]    Jared Quezada), Cardiovascular Disease; Internal Medicine  [address] [place] [address]  Phone: [phone]  Fax: [phone]

## 2018-07-06 NOTE — DISCHARGE NOTE ADULT - HOSPITAL COURSE
CC: bone pain    HPI: 78 y/o F PMHx with osteoporosis, chronic back pain , lumbar spinal stenosis, L2 compression fracture, seen by Dr Isidro for anemia / osteoporosis / HILARIO for Myeloma was largely unremarkable based on Labs ( although she possibly had a low level M protein ), marrow aspirate/ biopsy and a repeat pelvic bone biopsy by IR;  Although a CT PET suggested pelvic osseous pathology ( sclerotic / lytic changes) a discussion with Radiology confirmed that the radiologic findings could also reflect osteoporosis with fractures/ changes following trauma.   Now  admitted  with severe lower back pain that radiates down the right leg.     7/7/18: S/p IR guided sacral bone biopsy on (7/6).     ROS: stated above.     Vital Signs Last 24 Hrs  T(C): 36.7 (06 Jul 2018 04:58), Max: 36.9 (05 Jul 2018 17:59)  T(F): 98.1 (06 Jul 2018 04:58), Max: 98.4 (05 Jul 2018 17:59)  HR: 67 (06 Jul 2018 04:58) (56 - 72)  BP: 152/56 (06 Jul 2018 04:58) (97/45 - 152/56)  BP(mean): --  RR: 18 (06 Jul 2018 04:58) (15 - 18)  SpO2: 100% (06 Jul 2018 04:58) (94% - 100%)    PHYSICAL EXAM:  Constitutional: NAD, awake and alert, well-developed female appears comfortable.   HEENT: PERR, EOMI, Normal Hearing, MMM  Neck: Soft and supple, No LAD, No JVD  Respiratory: Breath sounds are clear bilaterally, No wheezing, rales or rhonchi  Cardiovascular: S1 and S2, regular rate and rhythm, no Murmurs, gallops or rubs  Gastrointestinal: Bowel Sounds present, soft, nontender, nondistended, no guarding, no rebound  Extremities: No peripheral edema  Vascular: 2+ peripheral pulses  Neurological: A/O x 3, no focal deficits  Musculoskeletal: 5/5 strength b/l upper and lower extremities - limited by pain at times.  Skin: No rashes    All labs/ images reviewed.     78 yo female with back and lower extremity pain secondary to osteoporosis/ factures/ spinal stenosis    # Pelvic Fractures  - no evidence of MM, extensive workup completed outpatient.   - s/p IR guided Bone biopsy with aspirate -->  - pain control / PT  - WBAT per Ortho.   - fall precautions --> [facility] today   - per Hem-Onc  based on recent serology HILARIO, two biopsies ( marrow and bone)  a plasma cell dyscrasia is not apparent   nor any other malignancy at this time  - analgesia and rehab    # HTN: low bp; dc losartan, decrease BB to 25 bid with parameters  - now with better control.     DVT ppx: heparin  Dc to [facility].   Discussed with RN and Onc.   Total time > 45 mins. CC: bone pain    HPI: 76 y/o F PMHx with osteoporosis, chronic back pain , lumbar spinal stenosis, L2 compression fracture, seen by Dr Isidro for anemia / osteoporosis / HILARIO for Myeloma was largely unremarkable based on Labs ( although she possibly had a low level M protein ), marrow aspirate/ biopsy and a repeat pelvic bone biopsy by IR;  Although a CT PET suggested pelvic osseous pathology ( sclerotic / lytic changes) a discussion with Radiology confirmed that the radiologic findings could also reflect osteoporosis with fractures/ changes following trauma.   Now  admitted  with severe lower back pain that radiates down the right leg.     7/7/18: S/p IR guided sacral bone biopsy on (7/6). Mild hip pain from biopsy site otherwise + BM this AM. Understands need for dc planning to [facility] today but has questions thus will speak with SW.     ROS: stated above.     Vital Signs Last 24 Hrs  T(C): 36.9 (07 Jul 2018 04:58), Max: 36.9 (07 Jul 2018 04:58)  T(F): 98.4 (07 Jul 2018 04:58), Max: 98.4 (07 Jul 2018 04:58)  HR: 72 (07 Jul 2018 04:58) (57 - 72)  BP: 165/58 (07 Jul 2018 04:58) (114/48 - 165/58)  BP(mean): --  RR: 18 (07 Jul 2018 04:58) (14 - 18)  SpO2: 98% (07 Jul 2018 04:58) (93% - 100%)    PHYSICAL EXAM:  Constitutional: NAD, awake and alert, well-developed female appears comfortable.   HEENT: PERR, EOMI, Normal Hearing, MMM  Neck: Soft and supple, No LAD, No JVD  Respiratory: Breath sounds are clear bilaterally, No wheezing, rales or rhonchi  Cardiovascular: S1 and S2, regular rate and rhythm, no Murmurs, gallops or rubs  Gastrointestinal: Bowel Sounds present, soft, nontender, nondistended, no guarding, no rebound  Extremities: No peripheral edema  Vascular: 2+ peripheral pulses  Neurological: A/O x 3, no focal deficits  Musculoskeletal: 5/5 strength b/l upper and lower extremities - limited by pain at times.  Skin: No rashes    All labs/ images reviewed.     78 yo female with back and lower extremity pain secondary to osteoporosis/ factures/ spinal stenosis    # Pelvic Fractures  - no evidence of MM, extensive workup completed outpatient.   - s/p IR guided Bone biopsy with aspirate --> f/u with Onc outpt for results in 1 week.   - pain control / PT  - WBAT per Ortho.   - fall precautions --> [facility] today   - per Hem-Onc  based on recent serology HILARIO, two biopsies ( marrow and bone)  a plasma cell dyscrasia is not apparent   nor any other malignancy at this time  - analgesia and rehab    # HTN: low bp; dc losartan, decrease BB to 25 bid with parameters  - now with better control.     DC to [facility].   Discussed with RN and Onc.   Total time > 45 mins.

## 2018-07-06 NOTE — DISCHARGE NOTE ADULT - PLAN OF CARE
improvement in mobility and decreased pain. Follow up with Dr. Batista for bone biopsy results and plans moving forward.   - Physical therapy for mobilization and pain control. Good blood pressure control STOP Cozaar and Amlodipine as blood pressure was low normal.   - continue Metoprolol but decrease frequency to 2x / day.

## 2018-07-06 NOTE — DISCHARGE NOTE ADULT - MEDICATION SUMMARY - MEDICATIONS TO CHANGE
I will SWITCH the dose or number of times a day I take the medications listed below when I get home from the hospital:    metoprolol tartrate 25 mg oral tablet  -- 1 tab(s) by mouth 3 times a day

## 2018-07-06 NOTE — DISCHARGE NOTE ADULT - MEDICATION SUMMARY - MEDICATIONS TO STOP TAKING
I will STOP taking the medications listed below when I get home from the hospital:    denosumab 60 mg/mL subcutaneous solution    Cozaar 100 mg oral tablet  -- 1 tab(s) by mouth once a day

## 2018-07-06 NOTE — DISCHARGE NOTE ADULT - MEDICATION SUMMARY - MEDICATIONS TO TAKE
I will START or STAY ON the medications listed below when I get home from the hospital:    traMADol 50 mg oral tablet  -- 1 tab(s) by mouth every 6 hours, As needed, Severe Pain (7 - 10)  -- Indication: For Pain    acetaminophen 325 mg oral tablet  -- 2 tab(s) by mouth every 6 hours, As needed, For Temp greater than 38 C (100.4 F)  -- Indication: For Fever    acetaminophen 500 mg oral tablet  -- 2 tab(s) by mouth every 8 hours  -- Indication: For Pain    gabapentin 100 mg oral capsule  -- 1 cap(s) by mouth once a day (at bedtime)  May increase to 3 caps QHS as tolerated  -- Indication: For Anti-depressant    Zoloft 50 mg oral tablet  -- 1 tab(s) by mouth once a day (at bedtime)  -- Indication: For Anti-depressant    atorvastatin 40 mg oral tablet  -- 1 tab(s) by mouth once a day  -- Indication: For High cholesterol    Xanax 0.5 mg oral tablet  -- orally 2 times a day, As Needed  -- Indication: For Anxiety    metoprolol tartrate 25 mg oral tablet  -- 1 tab(s) by mouth 2 times a day  -- Indication: For High blood pressure    Zantac 150 oral tablet  -- 1 tab(s) by mouth once a day  -- Indication: For Antacid

## 2018-07-07 VITALS
HEART RATE: 72 BPM | SYSTOLIC BLOOD PRESSURE: 165 MMHG | DIASTOLIC BLOOD PRESSURE: 58 MMHG | TEMPERATURE: 98 F | RESPIRATION RATE: 18 BRPM | OXYGEN SATURATION: 98 %

## 2018-07-07 LAB
ANION GAP SERPL CALC-SCNC: 6 MMOL/L — SIGNIFICANT CHANGE UP (ref 5–17)
BUN SERPL-MCNC: 33 MG/DL — HIGH (ref 7–23)
CALCIUM SERPL-MCNC: 9.2 MG/DL — SIGNIFICANT CHANGE UP (ref 8.5–10.1)
CHLORIDE SERPL-SCNC: 109 MMOL/L — HIGH (ref 96–108)
CO2 SERPL-SCNC: 25 MMOL/L — SIGNIFICANT CHANGE UP (ref 22–31)
CREAT SERPL-MCNC: 1.09 MG/DL — SIGNIFICANT CHANGE UP (ref 0.5–1.3)
GLUCOSE SERPL-MCNC: 88 MG/DL — SIGNIFICANT CHANGE UP (ref 70–99)
HCT VFR BLD CALC: 28.6 % — LOW (ref 34.5–45)
HGB BLD-MCNC: 9.5 G/DL — LOW (ref 11.5–15.5)
MCHC RBC-ENTMCNC: 30.9 PG — SIGNIFICANT CHANGE UP (ref 27–34)
MCHC RBC-ENTMCNC: 33.2 GM/DL — SIGNIFICANT CHANGE UP (ref 32–36)
MCV RBC AUTO: 93.2 FL — SIGNIFICANT CHANGE UP (ref 80–100)
NRBC # BLD: 0 /100 WBCS — SIGNIFICANT CHANGE UP (ref 0–0)
PLATELET # BLD AUTO: 248 K/UL — SIGNIFICANT CHANGE UP (ref 150–400)
POTASSIUM SERPL-MCNC: 5 MMOL/L — SIGNIFICANT CHANGE UP (ref 3.5–5.3)
POTASSIUM SERPL-SCNC: 5 MMOL/L — SIGNIFICANT CHANGE UP (ref 3.5–5.3)
RBC # BLD: 3.07 M/UL — LOW (ref 3.8–5.2)
RBC # FLD: 12.9 % — SIGNIFICANT CHANGE UP (ref 10.3–14.5)
SODIUM SERPL-SCNC: 140 MMOL/L — SIGNIFICANT CHANGE UP (ref 135–145)
WBC # BLD: 7.84 K/UL — SIGNIFICANT CHANGE UP (ref 3.8–10.5)
WBC # FLD AUTO: 7.84 K/UL — SIGNIFICANT CHANGE UP (ref 3.8–10.5)

## 2018-07-07 RX ORDER — CALCIUM CARBONATE 500(1250)
2 TABLET ORAL ONCE
Qty: 0 | Refills: 0 | Status: COMPLETED | OUTPATIENT
Start: 2018-07-07 | End: 2018-07-07

## 2018-07-07 RX ORDER — IBUPROFEN 200 MG
600 TABLET ORAL EVERY 6 HOURS
Qty: 0 | Refills: 0 | Status: DISCONTINUED | OUTPATIENT
Start: 2018-07-07 | End: 2018-07-07

## 2018-07-07 RX ADMIN — Medication 1000 MILLIGRAM(S): at 06:00

## 2018-07-07 RX ADMIN — Medication 0.5 MILLIGRAM(S): at 12:31

## 2018-07-07 RX ADMIN — Medication 1000 MILLIGRAM(S): at 05:30

## 2018-07-07 RX ADMIN — Medication 25 MILLIGRAM(S): at 05:31

## 2018-07-07 RX ADMIN — Medication 100 MILLIGRAM(S): at 05:31

## 2018-07-07 RX ADMIN — Medication 600 MILLIGRAM(S): at 11:41

## 2018-07-07 RX ADMIN — HEPARIN SODIUM 5000 UNIT(S): 5000 INJECTION INTRAVENOUS; SUBCUTANEOUS at 05:30

## 2018-07-07 NOTE — PROGRESS NOTE ADULT - SUBJECTIVE AND OBJECTIVE BOX
78 y/o F PMHx with osteoporosis, chronic back pain , lumbar spinal stenosis, L2 compression fracture, seen by Dr Isidro for anemia / osteoporosis / HILARIO for Myeloma was largely unremarkable based on Labs ( although she possibly had a low level M protein ), marrow aspirate/ biopsy and a repeat pelvic bone biopsy by IR;  Although a CT PET suggested pelvic osseous pathology ( sclerotic / lytic changes) a discussion with Radiology confirmed that the radiologic findings could also reflect osteoporosis with fractures/ changes following trauma.   Now  admitted  with severe lower back pain that radiates down the right leg. The patient states that her symptoms worsened acutely last night (). The patient states that her symptoms subside when seated.     CT Pelvis "  progression of heterogeneous lytic lesion"  involving the LEFT sacral analog with an underlying insufficiency fracture that has progressed. No heterogeneous lucent lesion in the RIGHT sacral area LEFT is noted with a new nondisplaced insufficiency fracture. There is progression of the heterogeneous sclerotic/lytic lesion in the RIGHT iliac bone with underlying known insufficiency fracture. 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    CT Lumbar spine: No acute fracture. Subacute fracture of RIGHT transverse process of L5.  Multilevel degenerative disc disease and spondylosis at L1-2, L2-3, L4-S1 with disc bulges are noted at L1-2 through L5-S1 which flatten the ventral thecal sac and narrow the BILATERAL neural foramina. Mild central stenosis noted at L1-2, severe central stenosis at L2-3 and L3-4, moderate central stenosis at L4-5 and L5-S1 on a degenerative basis.    MR LS Spine => 1.  Severe canal stenosis at L1-L2 level secondary to 10 mm retropulsed fragment from chronic compression fracture and associated 10 mm disc bulge.  2.  Degenerative disc disease in lower lumbar spine.   3.  Abnormal bone marrow signal consistent with edema seen in sacrum bilaterally more prominent on the left which is incompletely visualized.  Findings correspond to areas of sclerosis with sacral fractures seen on CT study.    : no change in her status      Constitutional: Appears comfortable, in  NAD, A/O X 3     Eyes: EOMI, PERRLA ;No icterus    ENMT:  No oral lesions, thrush; pharynx not injected    Neck:  Supple; No masses, lymph nodes, no bruits    Breasts: No masses, rash, axillary nodes    Back: No tenderness     Respiratory:  Clear to A/P, No wheezes, rhonchi, rales. No dullness to percussion    Cardiovascular: Normal rate and rhythm; normal S1 and S2; No murmurs. No gallop    Gastrointestinal: No distension, normal bowl sounds; No tendeness , guarding, rebound;  no masses, no hepatosplenomegaly no fluid wave    Genitourinary: No flank pains, no inguinal adenopathy    Rectal: NA    Extremities:  No phlebitis, no edema    Vascular: No acrocyanosis, no edema    Neurological: Alert and oriented. Cranial nerves II-XII WNL, Upper extremity / lower extremity  strength WNL;  Reflexes WNL, Plantar downgoing ; No cerebellar signs    Skin: No rash, petechiae purpura, ecchymoses    Lymph Nodes: No cervical, supraclavicular, axillary, inguinal adenopathy    Musculoskeletal: No joint swelling    Psychiatric: Alert, oriented, normal affect        LABS:                        9.0    8.35  )-----------( 274      ( 2018 05:38 )             27.5     07-04    140  |  110<H>  |  25<H>  ----------------------------<  110<H>  4.2   |  24  |  1.03    Ca    8.5      2018 05:38  Mg     2.1     07-04    TPro  6.5  /  Alb  3.2<L>  /  TBili  0.2  /  DBili  x   /  AST  14<L>  /  ALT  18  /  AlkPhos  98  07-04      Urinalysis Basic - ( 2018 15:55 )    Color: Yellow / Appearance: Clear / S.010 / pH: x  Gluc: x / Ketone: Negative  / Bili: Negative / Urobili: Negative mg/dL   Blood: x / Protein: Negative mg/dL / Nitrite: Positive   Leuk Esterase: Moderate / RBC: 6-10 /HPF / WBC >50   Sq Epi: x / Non Sq Epi: Few / Bacteria: Many
Pt S/E at bedside, no acute events overnight, pain controlled    Vital Signs Last 24 Hrs  T(C): 36.6 (05 Jul 2018 05:05), Max: 36.6 (05 Jul 2018 05:05)  T(F): 97.9 (05 Jul 2018 05:05), Max: 97.9 (05 Jul 2018 05:05)  HR: 61 (05 Jul 2018 05:05) (61 - 73)  BP: 110/51 (05 Jul 2018 05:05) (110/51 - 154/56)  BP(mean): --  RR: 16 (05 Jul 2018 05:05) (16 - 18)  SpO2: 95% (05 Jul 2018 05:05) (95% - 96%)    Gen: NAD, AAOx3      RLE: Skin intact, no erythema, no ecchymosis. SILT in toes 1-5. +2/4 DP/PT. No calf tenderness. EHL/FHL/TA/GS intact.    LLE: Skin intact, no erythema, no ecchymosis. SILT in toes 1-5. +2/4 DP/PT. No calf tenderness. EHL/FHL/TA/GS intact.
78 y/o F PMHx with osteoporosis, chronic back pain , lumbar spinal stenosis, L2 compression fracture, seen by Dr Isidro for anemia / osteoporosis / HILARIO for Myeloma was largely unremarkable based on Labs ( although she possibly had a low level M protein ), marrow aspirate/ biopsy and a repeat pelvic bone biopsy by IR;  Although a CT PET suggested pelvic osseous pathology ( sclerotic / lytic changes) a discussion with Radiology confirmed that the radiologic findings could also reflect osteoporosis with fractures/ changes following trauma.   Now  admitted  with severe lower back pain that radiates down the right leg. The patient states that her symptoms worsened acutely last night (). The patient states that her symptoms subside when seated.     CT Pelvis "  progression of heterogeneous lytic lesion"  involving the LEFT sacral analog with an underlying insufficiency fracture that has progressed. No heterogeneous lucent lesion in the RIGHT sacral area LEFT is noted with a new nondisplaced insufficiency fracture. There is progression of the heterogeneous sclerotic/lytic lesion in the RIGHT iliac bone with underlying known insufficiency fracture. 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    CT Lumbar spine: No acute fracture. Subacute fracture of RIGHT transverse process of L5.  Multilevel degenerative disc disease and spondylosis at L1-2, L2-3, L4-S1 with disc bulges are noted at L1-2 through L5-S1 which flatten the ventral thecal sac and narrow the BILATERAL neural foramina. Mild central stenosis noted at L1-2, severe central stenosis at L2-3 and L3-4, moderate central stenosis at L4-5 and L5-S1 on a degenerative basis.    MR LS Spine => 1.  Severe canal stenosis at L1-L2 level secondary to 10 mm retropulsed fragment from chronic compression fracture and associated 10 mm disc bulge.  2.  Degenerative disc disease in lower lumbar spine.   3.  Abnormal bone marrow signal consistent with edema seen in sacrum bilaterally more prominent on the left which is incompletely visualized.  Findings correspond to areas of sclerosis with sacral fractures seen on CT study.    : no change in her status      Constitutional: Appears comfortable, in  NAD, A/O X 3     Eyes: EOMI, PERRLA ;No icterus    ENMT:  No oral lesions, thrush; pharynx not injected    Neck:  Supple; No masses, lymph nodes, no bruits    Breasts: No masses, rash, axillary nodes    Back: No tenderness     Respiratory:  Clear to A/P, No wheezes, rhonchi, rales. No dullness to percussion    Cardiovascular: Normal rate and rhythm; normal S1 and S2; No murmurs. No gallop    Gastrointestinal: No distension, normal bowl sounds; No tendeness , guarding, rebound;  no masses, no hepatosplenomegaly no fluid wave    Genitourinary: No flank pains, no inguinal adenopathy    Rectal: NA    Extremities:  No phlebitis, no edema    Vascular: No acrocyanosis, no edema    Neurological: Alert and oriented. Cranial nerves II-XII WNL, Upper extremity / lower extremity  strength WNL;  Reflexes WNL, Plantar downgoing ; No cerebellar signs    Skin: No rash, petechiae purpura, ecchymoses    Lymph Nodes: No cervical, supraclavicular, axillary, inguinal adenopathy    Musculoskeletal: No joint swelling    Psychiatric: Alert, oriented, normal affect        LABS:                        9.0    8.35  )-----------( 274      ( 2018 05:38 )             27.5     07-04    140  |  110<H>  |  25<H>  ----------------------------<  110<H>  4.2   |  24  |  1.03    Ca    8.5      2018 05:38  Mg     2.1     07-04    TPro  6.5  /  Alb  3.2<L>  /  TBili  0.2  /  DBili  x   /  AST  14<L>  /  ALT  18  /  AlkPhos  98  07-04      Urinalysis Basic - ( 2018 15:55 )    Color: Yellow / Appearance: Clear / S.010 / pH: x  Gluc: x / Ketone: Negative  / Bili: Negative / Urobili: Negative mg/dL   Blood: x / Protein: Negative mg/dL / Nitrite: Positive   Leuk Esterase: Moderate / RBC: 6-10 /HPF / WBC >50   Sq Epi: x / Non Sq Epi: Few / Bacteria: Many
CC: bone pain    HPI: 78 y/o F PMHx with osteoporosis, chronic back pain , lumbar spinal stenosis, L2 compression fracture, seen by Dr Isidro for anemia / osteoporosis / HILARIO for Myeloma was largely unremarkable based on Labs ( although she possibly had a low level M protein ), marrow aspirate/ biopsy and a repeat pelvic bone biopsy by IR;  Although a CT PET suggested pelvic osseous pathology ( sclerotic / lytic changes) a discussion with Radiology confirmed that the radiologic findings could also reflect osteoporosis with fractures/ changes following trauma.   Now  admitted  with severe lower back pain that radiates down the right leg.     7/6/18: Sub; pain at present in hip with generalized weakness. No CP / SOB. Case discussed w/ Onc at bedside.   ROS: stated above.     Vital Signs Last 24 Hrs  T(C): 36.7 (06 Jul 2018 04:58), Max: 36.9 (05 Jul 2018 17:59)  T(F): 98.1 (06 Jul 2018 04:58), Max: 98.4 (05 Jul 2018 17:59)  HR: 67 (06 Jul 2018 04:58) (56 - 72)  BP: 152/56 (06 Jul 2018 04:58) (97/45 - 152/56)  BP(mean): --  RR: 18 (06 Jul 2018 04:58) (15 - 18)  SpO2: 100% (06 Jul 2018 04:58) (94% - 100%)    PHYSICAL EXAM:  Constitutional: NAD, awake and alert, well-developed female appears comfortable.   HEENT: PERR, EOMI, Normal Hearing, MMM  Neck: Soft and supple, No LAD, No JVD  Respiratory: Breath sounds are clear bilaterally, No wheezing, rales or rhonchi  Cardiovascular: S1 and S2, regular rate and rhythm, no Murmurs, gallops or rubs  Gastrointestinal: Bowel Sounds present, soft, nontender, nondistended, no guarding, no rebound  Extremities: No peripheral edema  Vascular: 2+ peripheral pulses  Neurological: A/O x 3, no focal deficits  Musculoskeletal: 5/5 strength b/l upper and lower extremities - limited by pain at times.  Skin: No rashes    MEDICATIONS  (STANDING):  atorvastatin 40 milliGRAM(s) Oral at bedtime  docusate sodium 100 milliGRAM(s) Oral three times a day  gabapentin 100 milliGRAM(s) Oral at bedtime  heparin  Injectable 5000 Unit(s) SubCutaneous every 8 hours  metoprolol tartrate 25 milliGRAM(s) Oral two times a day  ranitidine 150 milliGRAM(s) Oral daily  sertraline 50 milliGRAM(s) Oral at bedtime    LABS: All Labs Reviewed:                        9.3    8.49  )-----------( 244      ( 06 Jul 2018 05:39 )             28.5     07-06    141  |  109<H>  |  43<H>  ----------------------------<  83  5.0   |  25  |  1.24    Ca    9.1      06 Jul 2018 05:39    TPro  6.1  /  Alb  3.4<L>  /  TBili  x   /  DBili  x   /  AST  x   /  ALT  x   /  AlkPhos  x   07-04  PT/INR - ( 06 Jul 2018 09:55 )   PT: 11.0 sec;   INR: 1.02 ratio         Xray Femur 2 Views, Bilat (07.03.18 @ 21:38) >  IMPRESSION:   Unremarkable radiographs of the femurs.           Xray Hip w/ Pelvis 2 Views, Bilateral (07.03.18 @ 19:03) >  1.  BILATERAL Hip:  No fracture.    2.  Pelvis:  Multiple sclerotic heterogeneous lesions involve the sacrum,   RIGHT iliac bone and symphysis pubis consistent with known multiple   myeloma with underlying fractures.
Patient S/E at bedside. No acute events overnight. Pain well controlled. Denies any numbness/tingling.    PHYSICAL EXAM:  Vital Signs Last 24 Hrs  T(C): 36.9 (07 Jul 2018 04:58), Max: 36.9 (07 Jul 2018 04:58)  T(F): 98.4 (07 Jul 2018 04:58), Max: 98.4 (07 Jul 2018 04:58)  HR: 72 (07 Jul 2018 04:58) (57 - 72)  BP: 165/58 (07 Jul 2018 04:58) (114/48 - 165/58)  RR: 18 (07 Jul 2018 04:58) (14 - 18)  SpO2: 98% (07 Jul 2018 04:58) (93% - 100%)    Gen: NAD, A&O x 3  LLE/RLE:   Skin intact, no erythema. No ecchymosis.   SILT L3-S1.   +DP/PT  + EHL/FHL/GS/TA.   Compartments soft and compressible.   No calf tenderness.
Patient seen and examined at bedside. No acute events overnight. Pain controlled.    PHYSICAL EXAM:   Vital Signs Last 24 Hrs  T(C): 36.9 (04 Jul 2018 05:02), Max: 36.9 (03 Jul 2018 12:57)  T(F): 98.4 (04 Jul 2018 05:02), Max: 98.5 (03 Jul 2018 12:57)  HR: 68 (04 Jul 2018 05:02) (60 - 73)  BP: 142/51 (04 Jul 2018 05:02) (131/60 - 176/68)  BP(mean): --  RR: 18 (04 Jul 2018 05:02) (16 - 18)  SpO2: 99% (04 Jul 2018 05:02) (99% - 100%)    RLE: Skin intact, no erythema, no ecchymosis. SILT in toes 1-5. +2/4 DP/PT. No calf tenderness. EHL/FHL/TA/GS intact. +4/5 strength Hip flexion    LLE: Skin intact, no erythema, no ecchymosis. SILT in toes 1-5. +2/4 DP/PT. No calf tenderness. EHL/FHL/TA/GS intact. +5/5 strength Hip flexion
Patient seen earlier this morning  Was  sitting up in chair  still with  pain    Recent radiographic studies of pelvis reviewed  with Dr. Ventura Orellana radiology  His sense is that abnormal findings certainly could be consistent with stress fractures and were not definitive for malignant process.    Case reviewed with orthopedic surgery physician's assistant Chinmay via telephone.  [facility] orthopedic oncology Dr. Almeida requested repeat biopsy.  i explained to the PA that it was essential that the biopsy include bone marrow aspiration which would be sent for flow cytometry and genetics in addition to the bone biopsy.  If interventional radiology could not do this then I would repeat a bone marrow biopsy and aspiration in the outpatient setting after rehabilitation.    Above reviewed in the extended discussion via telephone with the patient's daughter.  I explained that the results of the biopsy and aspiration would not be back until next week and if  A rehabilitation bed is available earlier I would agree with discharge to rehabilitation and then based on biopsy and aspiration   results a new strategy will be made.    patient's daughter will be in touch with me next Tuesday and hope that the biopsy results will be available.  All questions answered
patient seen early this morning,, up in chair ,only complaint is   fatigue and long-standing low back pelvic pain  has just started on tramadol    Repeat laboratory studies for plasma cell dyscrasia all normal  Serum immunofixation      no monoclonal protein  SPEP: normal serum electrophoresis  Quantitative immunoglobulins IgG, IgA, IgM, all normal  EDD lambda light chains; A light chains normal  Lambda free light chain ratio normal  total protein normal  Calcium normal    I have asked radiology to review review all her films and more waiting their response  the initial interpretation was based on the presumption the patient has a known diagnosis of myeloma    ferritin 22 however this is in the face of elevated ESR 58,   his ferritin is an acute phase reactant This ferritin levelis probably low normal  Iron 79, percent saturation 25%  B12 671 normal.    Impression: pelvic fractures/lesions could be consistent with insufficiency fractures which are healing  Or malignant process  Of note bone marrow biopsy, subsequent interventional radiology biopsy and flow cytometry nondiagnostic for malignancy  laboratory studies for plasma cell dyscrasia on multiple occasions unremarkable    Of note is anemia of unknown etiology  An elevated sedimentation rate  also noted is microscopic hematuria    Plan: agree with consultation by orthopedic  oncology.  after their assessment I'm willing to repeat another bone marrow biopsy and aspiration  This can be done in the outpatient setting after rehabilitation    repeat urine and if microscopic hematuria persists consider urology evaluation

## 2018-07-07 NOTE — PROGRESS NOTE ADULT - ASSESSMENT
76 yo female with back and lower extremity pain secondary to osteoporosis/ factures/ spinal stenosis     - per Hem-Onc  based on recent serology HILARIO, two biopsies ( marrow and bone)  a plasma cell dyscrasia is not apparent   nor any other malignancy at this time  - analgesia and rehab
A/P: 77F with BL Pelvic/sacral insufficiency fractures  After d/w Dr. Sy yesterday and thorough review of medical records, lesions unlikely secondary to myeloma and no clear etiology, possibly secondary to osteoporosis/trauma  Negative tissue biopsy of sacral lesion in June 2018  [facility]  Pain Control  DVT Prophylaxis   WBAT BL LE  F/U Imaging  F/U Labs  Will discuss w/ Dr. Stallings and advise if plan changes
78 yo female with back and lower extremity pain secondary to osteoporosis/ factures/ spinal stenosis     - per Hem-Onc  based on recent serology HILARIO, two biopsies ( marrow and bone)  a plasma cell dyscrasia is not apparent   nor any other malignancy at this time  - analgesia and rehab  - HTN: low bp; dc losartan, decrease BB to 25 bid with parameters
78 yo female with back and lower extremity pain secondary to osteoporosis/ factures/ spinal stenosis    # Pelvic Fractures  - no evidence of MM, extensive workup completed outpatient.   - for IR guided Bone biopsy today with aspirate -->if unable to obtain aspirate then patient to pursue BMB in outpatient setting with Onc.   - pain control / PT  - WBAT per Ortho.   - fall precautions --> [facility] tomorrow.   - per Hem-Onc  based on recent serology HILARIO, two biopsies ( marrow and bone)  a plasma cell dyscrasia is not apparent   nor any other malignancy at this time  - analgesia and rehab    # HTN: low bp; dc losartan, decrease BB to 25 bid with parameters  - now with better control.     DVT ppx: heparin  Dispo: remain inpatient today, tentative dc to [facility] tomorrow.   Discussed with IDR and Onc.   Total time > 45 mins.
A/P: 77F w/ Pathological Fx of Pelvis and Sacrum s/p IR Biopsy  Pain Control  DVT Prophylaxis  WBAT  F/U Biopsy Results  PT  D/C Planning with outpatient follow-up  Will discuss with attending, and advise if plan changes
A/P: 77y Female with Bilateral sacral insufficiency fractures L>R, chronic pubic insufficiency fx  Pain Control  DVT Prophylaxis   NWB  F/U Imaging  F/U Labs  Will discuss w/ Dr. Stallings and advise if plan changes

## 2018-07-10 LAB — SURGICAL PATHOLOGY FINAL REPORT - CH: SIGNIFICANT CHANGE UP

## 2018-07-11 DIAGNOSIS — M47.816 SPONDYLOSIS WITHOUT MYELOPATHY OR RADICULOPATHY, LUMBAR REGION: ICD-10-CM

## 2018-07-11 DIAGNOSIS — M54.9 DORSALGIA, UNSPECIFIED: ICD-10-CM

## 2018-07-11 DIAGNOSIS — M48.061 SPINAL STENOSIS, LUMBAR REGION WITHOUT NEUROGENIC CLAUDICATION: ICD-10-CM

## 2018-07-11 DIAGNOSIS — M84.58XA PATHOLOGICAL FRACTURE IN NEOPLASTIC DISEASE, OTHER SPECIFIED SITE, INITIAL ENCOUNTER FOR FRACTURE: ICD-10-CM

## 2018-07-11 DIAGNOSIS — E78.5 HYPERLIPIDEMIA, UNSPECIFIED: ICD-10-CM

## 2018-07-11 DIAGNOSIS — I10 ESSENTIAL (PRIMARY) HYPERTENSION: ICD-10-CM

## 2018-07-18 ENCOUNTER — APPOINTMENT (OUTPATIENT)
Dept: OBGYN | Facility: CLINIC | Age: 78
End: 2018-07-18

## 2018-08-02 ENCOUNTER — TRANSCRIPTION ENCOUNTER (OUTPATIENT)
Age: 78
End: 2018-08-02

## 2018-08-20 ENCOUNTER — TRANSCRIPTION ENCOUNTER (OUTPATIENT)
Age: 78
End: 2018-08-20

## 2018-08-21 ENCOUNTER — RX RENEWAL (OUTPATIENT)
Age: 78
End: 2018-08-21

## 2018-09-17 ENCOUNTER — APPOINTMENT (OUTPATIENT)
Dept: RHEUMATOLOGY | Facility: CLINIC | Age: 78
End: 2018-09-17
Payer: MEDICARE

## 2018-09-17 VITALS
OXYGEN SATURATION: 96 % | DIASTOLIC BLOOD PRESSURE: 72 MMHG | BODY MASS INDEX: 26.68 KG/M2 | SYSTOLIC BLOOD PRESSURE: 130 MMHG | WEIGHT: 166 LBS | HEIGHT: 66 IN | HEART RATE: 64 BPM

## 2018-09-17 PROCEDURE — 96372 THER/PROPH/DIAG INJ SC/IM: CPT

## 2018-09-18 ENCOUNTER — MED ADMIN CHARGE (OUTPATIENT)
Age: 78
End: 2018-09-18

## 2018-09-18 RX ORDER — DENOSUMAB 60 MG/ML
60 INJECTION SUBCUTANEOUS
Qty: 0 | Refills: 0 | Status: COMPLETED | OUTPATIENT
Start: 2018-09-18

## 2018-09-18 RX ADMIN — DENOSUMAB 0 MG/ML: 60 INJECTION SUBCUTANEOUS at 00:00

## 2018-10-16 ENCOUNTER — TRANSCRIPTION ENCOUNTER (OUTPATIENT)
Age: 78
End: 2018-10-16

## 2018-10-18 ENCOUNTER — EMERGENCY (EMERGENCY)
Facility: HOSPITAL | Age: 78
LOS: 0 days | Discharge: HOME | End: 2018-10-18
Attending: EMERGENCY MEDICINE | Admitting: EMERGENCY MEDICINE
Payer: MEDICARE

## 2018-10-18 VITALS — WEIGHT: 160.06 LBS

## 2018-10-18 VITALS
HEART RATE: 63 BPM | RESPIRATION RATE: 17 BRPM | TEMPERATURE: 99 F | DIASTOLIC BLOOD PRESSURE: 79 MMHG | OXYGEN SATURATION: 100 % | SYSTOLIC BLOOD PRESSURE: 190 MMHG

## 2018-10-18 DIAGNOSIS — I10 ESSENTIAL (PRIMARY) HYPERTENSION: ICD-10-CM

## 2018-10-18 DIAGNOSIS — Z98.890 OTHER SPECIFIED POSTPROCEDURAL STATES: Chronic | ICD-10-CM

## 2018-10-18 DIAGNOSIS — Z92.241 PERSONAL HISTORY OF SYSTEMIC STEROID THERAPY: Chronic | ICD-10-CM

## 2018-10-18 DIAGNOSIS — R07.89 OTHER CHEST PAIN: ICD-10-CM

## 2018-10-18 DIAGNOSIS — M81.0 AGE-RELATED OSTEOPOROSIS WITHOUT CURRENT PATHOLOGICAL FRACTURE: ICD-10-CM

## 2018-10-18 DIAGNOSIS — M48.061 SPINAL STENOSIS, LUMBAR REGION WITHOUT NEUROGENIC CLAUDICATION: ICD-10-CM

## 2018-10-18 DIAGNOSIS — M54.9 DORSALGIA, UNSPECIFIED: ICD-10-CM

## 2018-10-18 DIAGNOSIS — E78.5 HYPERLIPIDEMIA, UNSPECIFIED: ICD-10-CM

## 2018-10-18 PROCEDURE — 99283 EMERGENCY DEPT VISIT LOW MDM: CPT

## 2018-10-18 PROCEDURE — 71100 X-RAY EXAM RIBS UNI 2 VIEWS: CPT | Mod: 26,LT

## 2018-10-18 RX ORDER — KETOROLAC TROMETHAMINE 30 MG/ML
30 SYRINGE (ML) INJECTION ONCE
Qty: 0 | Refills: 0 | Status: DISCONTINUED | OUTPATIENT
Start: 2018-10-18 | End: 2018-10-18

## 2018-10-18 RX ADMIN — Medication 30 MILLIGRAM(S): at 11:24

## 2018-10-18 NOTE — ED ADULT TRIAGE NOTE - CHIEF COMPLAINT QUOTE
Pt presents to ED c/o left posterior rib pain for several days. Pt reports she had an x-ray but the pain is worse. Pt denies trauma/injury. Pt reports hx of spinal stenosis.

## 2018-10-18 NOTE — ED STATDOCS - OBJECTIVE STATEMENT
78 y/o female with a PMHx of HTN, HLD, chronic back pain, gall stones, spinal stenosis, osteoporosis presents to the ED c/o "severe sharp" rib pain. Pt took Aleve for pain with relief. Pt notes she had a vitamin stuck in her throat recently. Pt was coughing after. Pt was seen at [facility] yesterday and had an XR. Has a family history of cardiac disease. No other complaints at this time.

## 2018-10-18 NOTE — ED ADULT NURSE NOTE - NSIMPLEMENTINTERV_GEN_ALL_ED
Implemented All Fall Risk Interventions:  [place] to call system. Call bell, personal items and telephone within reach. Instruct patient to call for assistance. Room bathroom lighting operational. Non-slip footwear when patient is off stretcher. Physically safe environment: no spills, clutter or unnecessary equipment. Stretcher in lowest position, wheels locked, appropriate side rails in place. Provide visual cue, wrist band, yellow gown, etc. Monitor gait and stability. Monitor for mental status changes and reorient to person, place, and time. Review medications for side effects contributing to fall risk. Reinforce activity limits and safety measures with patient and family.

## 2018-10-18 NOTE — ED STATDOCS - MEDICAL DECISION MAKING DETAILS
XR unremarkable.  Likely musculoskeletal chest wall pain.  Given toradol.  Continue NSAIDs at home, incentive spirometer.

## 2018-10-18 NOTE — ED STATDOCS - PMH
Chronic back pain, unspecified back location, unspecified back pain laterality    Closed compression fracture of second lumbar vertebra, sequela    Gall stones    Hyperlipidemia, unspecified hyperlipidemia type    Hypertension, unspecified type    Osteoporosis    Spinal stenosis    Spinal stenosis of lumbar region, unspecified whether neurogenic claudication present

## 2018-10-18 NOTE — ED STATDOCS - PROGRESS NOTE DETAILS
68 yr. old female PMH: HTN, HLD Chronic Back Pain Spinal Stenosis OP presents to ED with sharp pain in left rib cage onset Friday after coughing on pill patient swallowed. Had chest X-Ray yesterday with no evidence of fracture or other. Seen and examined by attending in Intake. Plan: Rib series. Will F/U with results and re evaluate.  Edis FARIA

## 2018-10-18 NOTE — ED STATDOCS - MUSCULOSKELETAL, MLM
range of motion is not limited. Left posterior rib tenderness. No overlying skin changes. No deformity.

## 2018-10-18 NOTE — ED ADULT NURSE NOTE - OBJECTIVE STATEMENT
Pt alert and oriented x3. pt presents with L sided rib pain denies fall or trauma. private aide at bedside

## 2018-11-06 DIAGNOSIS — R92.2 INCONCLUSIVE MAMMOGRAM: ICD-10-CM

## 2018-11-08 PROBLEM — K80.20 CALCULUS OF GALLBLADDER WITHOUT CHOLECYSTITIS WITHOUT OBSTRUCTION: Chronic | Status: ACTIVE | Noted: 2018-10-18

## 2018-11-08 PROBLEM — M81.0 AGE-RELATED OSTEOPOROSIS WITHOUT CURRENT PATHOLOGICAL FRACTURE: Chronic | Status: ACTIVE | Noted: 2018-10-18

## 2018-11-08 PROBLEM — M48.00 SPINAL STENOSIS, SITE UNSPECIFIED: Chronic | Status: ACTIVE | Noted: 2018-10-18

## 2019-01-31 ENCOUNTER — APPOINTMENT (OUTPATIENT)
Dept: OBGYN | Facility: CLINIC | Age: 79
End: 2019-01-31
Payer: MEDICARE

## 2019-01-31 VITALS
OXYGEN SATURATION: 97 % | HEART RATE: 64 BPM | BODY MASS INDEX: 26.84 KG/M2 | WEIGHT: 167 LBS | SYSTOLIC BLOOD PRESSURE: 122 MMHG | HEIGHT: 66 IN | DIASTOLIC BLOOD PRESSURE: 80 MMHG

## 2019-01-31 DIAGNOSIS — Z01.419 ENCOUNTER FOR GYNECOLOGICAL EXAMINATION (GENERAL) (ROUTINE) W/OUT ABNORMAL FINDINGS: ICD-10-CM

## 2019-01-31 PROCEDURE — G0101: CPT

## 2019-01-31 NOTE — HISTORY OF PRESENT ILLNESS
[Fair] : being in fair health [Postmenopausal] : is postmenopausal [HPV Vaccine NA Due to Age] : HPV vaccine not available to patient due to age

## 2019-01-31 NOTE — CHIEF COMPLAINT
[Annual Visit] : annual visit [FreeTextEntry1] : spinal stenosis--uses walker.Homebound with aid\par pap 2016,mammo 2018,colon 2011 dexa 2017

## 2019-01-31 NOTE — REVIEW OF SYSTEMS
[Feeling Tired] : feeling tired [Arthralgias] : arthralgias [Joint Pain] : joint pain [Joint Swelling] : joint swelling [Joint Stiffness] : joint stiffness [Nl] : Integumentary

## 2019-02-01 LAB — HPV HIGH+LOW RISK DNA PNL CVX: NOT DETECTED

## 2019-02-05 LAB — CYTOLOGY CVX/VAG DOC THIN PREP: NORMAL

## 2019-03-15 ENCOUNTER — TRANSCRIPTION ENCOUNTER (OUTPATIENT)
Age: 79
End: 2019-03-15

## 2019-03-18 ENCOUNTER — APPOINTMENT (OUTPATIENT)
Dept: RHEUMATOLOGY | Facility: CLINIC | Age: 79
End: 2019-03-18
Payer: MEDICARE

## 2019-03-18 VITALS
BODY MASS INDEX: 26.84 KG/M2 | HEART RATE: 59 BPM | WEIGHT: 167 LBS | DIASTOLIC BLOOD PRESSURE: 84 MMHG | SYSTOLIC BLOOD PRESSURE: 176 MMHG | HEIGHT: 66 IN

## 2019-03-18 DIAGNOSIS — M48.50XS COLLAPSED VERTEBRA, NOT ELSEWHERE CLASSIFIED, SITE UNSPECIFIED, SEQUELA OF FRACTURE: ICD-10-CM

## 2019-03-18 DIAGNOSIS — M54.16 RADICULOPATHY, LUMBAR REGION: ICD-10-CM

## 2019-03-18 PROCEDURE — 99213 OFFICE O/P EST LOW 20 MIN: CPT | Mod: 25

## 2019-03-18 PROCEDURE — 96372 THER/PROPH/DIAG INJ SC/IM: CPT

## 2019-03-18 RX ORDER — DENOSUMAB 60 MG/ML
60 INJECTION SUBCUTANEOUS
Qty: 1 | Refills: 0 | Status: COMPLETED | OUTPATIENT
Start: 2019-03-18

## 2019-03-18 RX ADMIN — DENOSUMAB 0 MG/ML: 60 INJECTION SUBCUTANEOUS at 00:00

## 2019-03-18 NOTE — REVIEW OF SYSTEMS
[Chills] : no chills [Fever] : no fever [Feeling Poorly] : feeling poorly [Feeling Tired] : feeling tired [Dry Eyes] : no dryness of the eyes [Sore Throat] : no sore throat [Palpitations] : no palpitations [Chest Pain] : no chest pain [Cough] : no cough [Abdominal Pain] : no abdominal pain [SOB on Exertion] : no shortness of breath during exertion [Arthralgias] : arthralgias [Joint Pain] : joint pain [Skin Lesions] : no skin lesions [Limb Weakness] : limb weakness [Difficulty Walking] : difficulty walking [Depression] : no depression [Muscle Weakness] : muscle weakness [Easy Bruising] : no tendency for easy bruising

## 2019-03-18 NOTE — ASSESSMENT
[FreeTextEntry1] : 77 year old female with a history of depression, HTN, and low back pain and osteoporosis.\par \par Osteoporosis-  \par She admits to a history of osteopenia and used fosamax for several years, which did not work as per the patient. Her risk factors include loss of height, of 3 inches. She is an exsmoker. She has a family history of osteoporosis.\par She is here for prolia today. BMD has improved, will continue with prolia. \par \par Lumbar radiculopathy - she is under care of pain management and has had recent epidurals with some relief. \par To continue gabapentin 300 mg qd\par \par OA-\par to use alleve prn. \par \par Forgetfulness-\par to see neurology\par \par I will be seeing her again in 6 months. She is aware to call any additional questions.

## 2019-03-18 NOTE — HISTORY OF PRESENT ILLNESS
[FreeTextEntry1] : 78 year old female with a history of depression, HTN, and low back pain and osteoporosis. \par She has chronic back pain, she has spinal stenosis and sees pain management. She has some pain in her hip and hand today. She has been told that she is not a surgical candidate.\par \par She is also here today for her prolia injection for the osteoporosis. She has been receiving them since 2016. BMD shows improvement but still with osteoporosis. So far she had been tolerating the injections well without any issues. She denies any new fractures. She has a good appetite and denies weight loss. She denies fevers chills or night sweats.\par \par She rates her current pain in her lower back at a 9/10. She denies numbness or tingling no muscle weakness. She does use a walker. \par She admits to becoming forgetful .

## 2019-03-18 NOTE — PROCEDURE
[Today's Date:] : Date: [unfilled] [Benefits] : benefits [Risks] : risks [Patient] : the patient [Alternatives] : alternatives [Therapeutic] : therapeutic [Consent Obtained] : written consent was obtained prior to the procedure and is detailed in the patient's record [#1 Site: ______] : #1 site identified in the [unfilled] [Tolerated Well] : the patient tolerated the procedure well [Ethyl Chloride] : ethyl chloride [No Complications] : there were no complications [Instructions Given] : handouts/patient instructions were given to patient [Patient Instructed to Call] : patient was instructed to call if redness at site, a decrease in range of motion or an increase in pain is noted after procedure. [de-identified] : prolia

## 2019-09-23 ENCOUNTER — APPOINTMENT (OUTPATIENT)
Dept: RHEUMATOLOGY | Facility: CLINIC | Age: 79
End: 2019-09-23
Payer: MEDICARE

## 2019-09-23 ENCOUNTER — RX RENEWAL (OUTPATIENT)
Age: 79
End: 2019-09-23

## 2019-09-23 VITALS
OXYGEN SATURATION: 97 % | HEART RATE: 69 BPM | SYSTOLIC BLOOD PRESSURE: 151 MMHG | HEIGHT: 66 IN | DIASTOLIC BLOOD PRESSURE: 79 MMHG | WEIGHT: 173 LBS | BODY MASS INDEX: 27.8 KG/M2

## 2019-09-23 DIAGNOSIS — M81.0 AGE-RELATED OSTEOPOROSIS W/OUT CURRENT PATHOLOGICAL FRACTURE: ICD-10-CM

## 2019-09-23 PROCEDURE — 96372 THER/PROPH/DIAG INJ SC/IM: CPT

## 2019-09-23 RX ORDER — DENOSUMAB 60 MG/ML
60 INJECTION SUBCUTANEOUS
Qty: 1 | Refills: 0 | Status: COMPLETED | OUTPATIENT
Start: 2019-09-23

## 2019-09-23 RX ORDER — DENOSUMAB 60 MG/ML
60 INJECTION SUBCUTANEOUS
Refills: 0 | Status: ACTIVE | COMMUNITY

## 2019-09-23 RX ORDER — DOXYCYCLINE HYCLATE 100 MG/1
100 CAPSULE ORAL
Qty: 20 | Refills: 0 | Status: DISCONTINUED | COMMUNITY
Start: 2019-04-06

## 2019-09-23 RX ORDER — ALBUTEROL SULFATE 90 UG/1
108 (90 BASE) AEROSOL, METERED RESPIRATORY (INHALATION)
Qty: 18 | Refills: 0 | Status: DISCONTINUED | COMMUNITY
Start: 2019-04-20

## 2019-09-23 RX ORDER — AMOXICILLIN AND CLAVULANATE POTASSIUM 875; 125 MG/1; MG/1
875-125 TABLET, COATED ORAL
Qty: 14 | Refills: 0 | Status: DISCONTINUED | COMMUNITY
Start: 2019-04-27

## 2019-09-23 RX ADMIN — DENOSUMAB 0 MG/ML: 60 INJECTION SUBCUTANEOUS at 00:00

## 2019-09-23 NOTE — ASSESSMENT
[FreeTextEntry1] : 78 year old female with a history of depression, HTN, and low back pain and osteoporosis.\par \par Osteoporosis-  \par She admits to a history of osteopenia and used fosamax for several years, which did not work as per the patient. Her risk factors include loss of height, of 3 inches. She is an exsmoker. She has a family history of osteoporosis.\par She is here for prolia today. BMD has improved, will continue with prolia. \par \par \par \par I will be seeing her again in 6 months. She is aware to call any additional questions.

## 2019-09-23 NOTE — PROCEDURE
[Today's Date:] : Date: [unfilled] [Risks] : risks [Patient] : the patient [Alternatives] : alternatives [Benefits] : benefits [Therapeutic] : therapeutic [Consent Obtained] : written consent was obtained prior to the procedure and is detailed in the patient's record [#1 Site: ______] : #1 site identified in the [unfilled] [Ethyl Chloride] : ethyl chloride [Tolerated Well] : the patient tolerated the procedure well [Instructions Given] : handouts/patient instructions were given to patient [No Complications] : there were no complications [Patient Instructed to Call] : patient was instructed to call if redness at site, a decrease in range of motion or an increase in pain is noted after procedure. [de-identified] : prolia

## 2019-10-11 ENCOUNTER — RX RENEWAL (OUTPATIENT)
Age: 79
End: 2019-10-11

## 2019-11-06 ENCOUNTER — RX RENEWAL (OUTPATIENT)
Age: 79
End: 2019-11-06

## 2019-11-06 RX ORDER — GABAPENTIN 300 MG/1
300 CAPSULE ORAL
Qty: 30 | Refills: 6 | Status: ACTIVE | COMMUNITY
Start: 2018-03-16 | End: 1900-01-01

## 2019-11-15 DIAGNOSIS — Z12.39 ENCOUNTER FOR OTHER SCREENING FOR MALIGNANT NEOPLASM OF BREAST: ICD-10-CM

## 2019-12-15 ENCOUNTER — INPATIENT (INPATIENT)
Facility: HOSPITAL | Age: 79
LOS: 2 days | Discharge: NURSING, REHAB OR LONG-TERM CARE | DRG: 543 | End: 2019-12-18
Attending: INTERNAL MEDICINE | Admitting: FAMILY MEDICINE
Payer: MEDICARE

## 2019-12-15 VITALS
SYSTOLIC BLOOD PRESSURE: 164 MMHG | OXYGEN SATURATION: 100 % | TEMPERATURE: 98 F | HEART RATE: 80 BPM | HEIGHT: 64 IN | DIASTOLIC BLOOD PRESSURE: 77 MMHG | RESPIRATION RATE: 18 BRPM | WEIGHT: 145.06 LBS

## 2019-12-15 DIAGNOSIS — M81.0 AGE-RELATED OSTEOPOROSIS WITHOUT CURRENT PATHOLOGICAL FRACTURE: ICD-10-CM

## 2019-12-15 DIAGNOSIS — K80.20 CALCULUS OF GALLBLADDER WITHOUT CHOLECYSTITIS WITHOUT OBSTRUCTION: ICD-10-CM

## 2019-12-15 DIAGNOSIS — M80.051A AGE-RELATED OSTEOPOROSIS WITH CURRENT PATHOLOGICAL FRACTURE, RIGHT FEMUR, INITIAL ENCOUNTER FOR FRACTURE: ICD-10-CM

## 2019-12-15 DIAGNOSIS — M54.9 DORSALGIA, UNSPECIFIED: ICD-10-CM

## 2019-12-15 DIAGNOSIS — M80.08XA AGE-RELATED OSTEOPOROSIS WITH CURRENT PATHOLOGICAL FRACTURE, VERTEBRA(E), INITIAL ENCOUNTER FOR FRACTURE: ICD-10-CM

## 2019-12-15 DIAGNOSIS — F32.9 MAJOR DEPRESSIVE DISORDER, SINGLE EPISODE, UNSPECIFIED: ICD-10-CM

## 2019-12-15 DIAGNOSIS — Z87.891 PERSONAL HISTORY OF NICOTINE DEPENDENCE: ICD-10-CM

## 2019-12-15 DIAGNOSIS — M51.36 OTHER INTERVERTEBRAL DISC DEGENERATION, LUMBAR REGION: ICD-10-CM

## 2019-12-15 DIAGNOSIS — Z79.82 LONG TERM (CURRENT) USE OF ASPIRIN: ICD-10-CM

## 2019-12-15 DIAGNOSIS — Z91.81 HISTORY OF FALLING: ICD-10-CM

## 2019-12-15 DIAGNOSIS — R26.89 OTHER ABNORMALITIES OF GAIT AND MOBILITY: ICD-10-CM

## 2019-12-15 DIAGNOSIS — R27.0 ATAXIA, UNSPECIFIED: ICD-10-CM

## 2019-12-15 DIAGNOSIS — E78.5 HYPERLIPIDEMIA, UNSPECIFIED: ICD-10-CM

## 2019-12-15 DIAGNOSIS — G89.29 OTHER CHRONIC PAIN: ICD-10-CM
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
## 2019-12-15 DIAGNOSIS — Z92.241 PERSONAL HISTORY OF SYSTEMIC STEROID THERAPY: Chronic | ICD-10-CM

## 2019-12-15 DIAGNOSIS — S81.812A LACERATION WITHOUT FOREIGN BODY, LEFT LOWER LEG, INITIAL ENCOUNTER: ICD-10-CM

## 2019-12-15 DIAGNOSIS — I10 ESSENTIAL (PRIMARY) HYPERTENSION: ICD-10-CM

## 2019-12-15 DIAGNOSIS — Z98.890 OTHER SPECIFIED POSTPROCEDURAL STATES: Chronic | ICD-10-CM

## 2019-12-15 DIAGNOSIS — N39.0 URINARY TRACT INFECTION, SITE NOT SPECIFIED: ICD-10-CM
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
## 2019-12-15 DIAGNOSIS — M48.061 SPINAL STENOSIS, LUMBAR REGION WITHOUT NEUROGENIC CLAUDICATION: ICD-10-CM
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
## 2019-12-15 DIAGNOSIS — S41.112A LACERATION WITHOUT FOREIGN BODY OF LEFT UPPER ARM, INITIAL ENCOUNTER: ICD-10-CM
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
## 2019-12-15 DIAGNOSIS — R32 UNSPECIFIED URINARY INCONTINENCE: ICD-10-CM

## 2019-12-15 DIAGNOSIS — G93.89 OTHER SPECIFIED DISORDERS OF BRAIN: ICD-10-CM
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
## 2019-12-15 LAB
ALBUMIN SERPL ELPH-MCNC: 3.3 G/DL — SIGNIFICANT CHANGE UP (ref 3.3–5)
ALP SERPL-CCNC: 90 U/L — SIGNIFICANT CHANGE UP (ref 40–120)
ALT FLD-CCNC: 23 U/L — SIGNIFICANT CHANGE UP (ref 12–78)
ANION GAP SERPL CALC-SCNC: 5 MMOL/L — SIGNIFICANT CHANGE UP (ref 5–17)
APPEARANCE UR: CLEAR — SIGNIFICANT CHANGE UP
APTT BLD: 29.5 SEC — SIGNIFICANT CHANGE UP (ref 27.5–36.3)
AST SERPL-CCNC: 12 U/L — LOW (ref 15–37)
BASOPHILS # BLD AUTO: 0.06 K/UL — SIGNIFICANT CHANGE UP (ref 0–0.2)
BASOPHILS NFR BLD AUTO: 0.4 % — SIGNIFICANT CHANGE UP (ref 0–2)
BILIRUB SERPL-MCNC: 0.3 MG/DL — SIGNIFICANT CHANGE UP (ref 0.2–1.2)
BILIRUB UR-MCNC: NEGATIVE — SIGNIFICANT CHANGE UP
BUN SERPL-MCNC: 45 MG/DL — HIGH (ref 7–23)
CALCIUM SERPL-MCNC: 8.8 MG/DL — SIGNIFICANT CHANGE UP (ref 8.5–10.1)
CHLORIDE SERPL-SCNC: 110 MMOL/L — HIGH (ref 96–108)
CK SERPL-CCNC: 65 U/L — SIGNIFICANT CHANGE UP (ref 26–192)
CO2 SERPL-SCNC: 26 MMOL/L — SIGNIFICANT CHANGE UP (ref 22–31)
COLOR SPEC: YELLOW — SIGNIFICANT CHANGE UP
CREAT SERPL-MCNC: 1.17 MG/DL — SIGNIFICANT CHANGE UP (ref 0.5–1.3)
DIFF PNL FLD: NEGATIVE — SIGNIFICANT CHANGE UP
EOSINOPHIL # BLD AUTO: 2.7 K/UL — HIGH (ref 0–0.5)
EOSINOPHIL NFR BLD AUTO: 19.7 % — HIGH (ref 0–6)
GLUCOSE SERPL-MCNC: 95 MG/DL — SIGNIFICANT CHANGE UP (ref 70–99)
GLUCOSE UR QL: NEGATIVE MG/DL — SIGNIFICANT CHANGE UP
HCT VFR BLD CALC: 32.1 % — LOW (ref 34.5–45)
HGB BLD-MCNC: 10.3 G/DL — LOW (ref 11.5–15.5)
IMM GRANULOCYTES NFR BLD AUTO: 0.4 % — SIGNIFICANT CHANGE UP (ref 0–1.5)
INR BLD: 0.95 RATIO — SIGNIFICANT CHANGE UP (ref 0.88–1.16)
KETONES UR-MCNC: NEGATIVE — SIGNIFICANT CHANGE UP
LEUKOCYTE ESTERASE UR-ACNC: NEGATIVE — SIGNIFICANT CHANGE UP
LYMPHOCYTES # BLD AUTO: 1.74 K/UL — SIGNIFICANT CHANGE UP (ref 1–3.3)
LYMPHOCYTES # BLD AUTO: 12.7 % — LOW (ref 13–44)
MAGNESIUM SERPL-MCNC: 2.3 MG/DL — SIGNIFICANT CHANGE UP (ref 1.6–2.6)
MCHC RBC-ENTMCNC: 31.8 PG — SIGNIFICANT CHANGE UP (ref 27–34)
MCHC RBC-ENTMCNC: 32.1 GM/DL — SIGNIFICANT CHANGE UP (ref 32–36)
MCV RBC AUTO: 99.1 FL — SIGNIFICANT CHANGE UP (ref 80–100)
MONOCYTES # BLD AUTO: 1.25 K/UL — HIGH (ref 0–0.9)
MONOCYTES NFR BLD AUTO: 9.1 % — SIGNIFICANT CHANGE UP (ref 2–14)
NEUTROPHILS # BLD AUTO: 7.91 K/UL — HIGH (ref 1.8–7.4)
NEUTROPHILS NFR BLD AUTO: 57.7 % — SIGNIFICANT CHANGE UP (ref 43–77)
NITRITE UR-MCNC: NEGATIVE — SIGNIFICANT CHANGE UP
PH UR: 6 — SIGNIFICANT CHANGE UP (ref 5–8)
PLATELET # BLD AUTO: 291 K/UL — SIGNIFICANT CHANGE UP (ref 150–400)
POTASSIUM SERPL-MCNC: 4.3 MMOL/L — SIGNIFICANT CHANGE UP (ref 3.5–5.3)
POTASSIUM SERPL-SCNC: 4.3 MMOL/L — SIGNIFICANT CHANGE UP (ref 3.5–5.3)
PROT SERPL-MCNC: 6.5 GM/DL — SIGNIFICANT CHANGE UP (ref 6–8.3)
PROT UR-MCNC: NEGATIVE MG/DL — SIGNIFICANT CHANGE UP
PROTHROM AB SERPL-ACNC: 10.5 SEC — SIGNIFICANT CHANGE UP (ref 10–12.9)
RBC # BLD: 3.24 M/UL — LOW (ref 3.8–5.2)
RBC # FLD: 12.1 % — SIGNIFICANT CHANGE UP (ref 10.3–14.5)
SODIUM SERPL-SCNC: 141 MMOL/L — SIGNIFICANT CHANGE UP (ref 135–145)
SP GR SPEC: 1.01 — SIGNIFICANT CHANGE UP (ref 1.01–1.02)
TROPONIN I SERPL-MCNC: 0.02 NG/ML — SIGNIFICANT CHANGE UP (ref 0.01–0.04)
UROBILINOGEN FLD QL: NEGATIVE MG/DL — SIGNIFICANT CHANGE UP
WBC # BLD: 13.71 K/UL — HIGH (ref 3.8–10.5)
WBC # FLD AUTO: 13.71 K/UL — HIGH (ref 3.8–10.5)

## 2019-12-15 PROCEDURE — 87086 URINE CULTURE/COLONY COUNT: CPT

## 2019-12-15 PROCEDURE — 72125 CT NECK SPINE W/O DYE: CPT | Mod: 26

## 2019-12-15 PROCEDURE — 72148 MRI LUMBAR SPINE W/O DYE: CPT | Mod: 26

## 2019-12-15 PROCEDURE — 70450 CT HEAD/BRAIN W/O DYE: CPT | Mod: 26

## 2019-12-15 PROCEDURE — 97116 GAIT TRAINING THERAPY: CPT | Mod: GP

## 2019-12-15 PROCEDURE — 73060 X-RAY EXAM OF HUMERUS: CPT | Mod: 26,LT

## 2019-12-15 PROCEDURE — 80048 BASIC METABOLIC PNL TOTAL CA: CPT

## 2019-12-15 PROCEDURE — 99221 1ST HOSP IP/OBS SF/LOW 40: CPT

## 2019-12-15 PROCEDURE — 72146 MRI CHEST SPINE W/O DYE: CPT

## 2019-12-15 PROCEDURE — 85025 COMPLETE CBC W/AUTO DIFF WBC: CPT

## 2019-12-15 PROCEDURE — 97162 PT EVAL MOD COMPLEX 30 MIN: CPT | Mod: GP

## 2019-12-15 PROCEDURE — 72146 MRI CHEST SPINE W/O DYE: CPT | Mod: 26

## 2019-12-15 PROCEDURE — 36415 COLL VENOUS BLD VENIPUNCTURE: CPT

## 2019-12-15 PROCEDURE — 73590 X-RAY EXAM OF LOWER LEG: CPT | Mod: 26,LT

## 2019-12-15 PROCEDURE — 93010 ELECTROCARDIOGRAM REPORT: CPT

## 2019-12-15 PROCEDURE — 81001 URINALYSIS AUTO W/SCOPE: CPT

## 2019-12-15 PROCEDURE — 97530 THERAPEUTIC ACTIVITIES: CPT | Mod: GP

## 2019-12-15 PROCEDURE — 72148 MRI LUMBAR SPINE W/O DYE: CPT

## 2019-12-15 PROCEDURE — 74177 CT ABD & PELVIS W/CONTRAST: CPT | Mod: 26

## 2019-12-15 PROCEDURE — 71260 CT THORAX DX C+: CPT | Mod: 26

## 2019-12-15 PROCEDURE — 87186 SC STD MICRODIL/AGAR DIL: CPT

## 2019-12-15 RX ORDER — MORPHINE SULFATE 50 MG/1
4 CAPSULE, EXTENDED RELEASE ORAL EVERY 4 HOURS
Refills: 0 | Status: DISCONTINUED | OUTPATIENT
Start: 2019-12-15 | End: 2019-12-18

## 2019-12-15 RX ORDER — SODIUM CHLORIDE 9 MG/ML
1000 INJECTION INTRAMUSCULAR; INTRAVENOUS; SUBCUTANEOUS ONCE
Refills: 0 | Status: COMPLETED | OUTPATIENT
Start: 2019-12-15 | End: 2019-12-15

## 2019-12-15 RX ORDER — ACETAMINOPHEN 500 MG
650 TABLET ORAL EVERY 6 HOURS
Refills: 0 | Status: DISCONTINUED | OUTPATIENT
Start: 2019-12-15 | End: 2019-12-18

## 2019-12-15 RX ORDER — FAMOTIDINE 10 MG/ML
20 INJECTION INTRAVENOUS DAILY
Refills: 0 | Status: DISCONTINUED | OUTPATIENT
Start: 2019-12-15 | End: 2019-12-18

## 2019-12-15 RX ORDER — GABAPENTIN 400 MG/1
100 CAPSULE ORAL AT BEDTIME
Refills: 0 | Status: DISCONTINUED | OUTPATIENT
Start: 2019-12-15 | End: 2019-12-18

## 2019-12-15 RX ORDER — SERTRALINE 25 MG/1
50 TABLET, FILM COATED ORAL DAILY
Refills: 0 | Status: DISCONTINUED | OUTPATIENT
Start: 2019-12-15 | End: 2019-12-18

## 2019-12-15 RX ORDER — ALPRAZOLAM 0.25 MG
0.5 TABLET ORAL ONCE
Refills: 0 | Status: DISCONTINUED | OUTPATIENT
Start: 2019-12-15 | End: 2019-12-15

## 2019-12-15 RX ORDER — ONDANSETRON 8 MG/1
4 TABLET, FILM COATED ORAL EVERY 6 HOURS
Refills: 0 | Status: DISCONTINUED | OUTPATIENT
Start: 2019-12-15 | End: 2019-12-18

## 2019-12-15 RX ORDER — TRAMADOL HYDROCHLORIDE 50 MG/1
50 TABLET ORAL EVERY 6 HOURS
Refills: 0 | Status: DISCONTINUED | OUTPATIENT
Start: 2019-12-15 | End: 2019-12-18

## 2019-12-15 RX ORDER — HEPARIN SODIUM 5000 [USP'U]/ML
5000 INJECTION INTRAVENOUS; SUBCUTANEOUS EVERY 12 HOURS
Refills: 0 | Status: DISCONTINUED | OUTPATIENT
Start: 2019-12-15 | End: 2019-12-18

## 2019-12-15 RX ORDER — ACETAMINOPHEN 500 MG
1000 TABLET ORAL ONCE
Refills: 0 | Status: COMPLETED | OUTPATIENT
Start: 2019-12-15 | End: 2019-12-15

## 2019-12-15 RX ORDER — ATORVASTATIN CALCIUM 80 MG/1
40 TABLET, FILM COATED ORAL AT BEDTIME
Refills: 0 | Status: DISCONTINUED | OUTPATIENT
Start: 2019-12-15 | End: 2019-12-18

## 2019-12-15 RX ORDER — ALPRAZOLAM 0.25 MG
0.5 TABLET ORAL
Refills: 0 | Status: DISCONTINUED | OUTPATIENT
Start: 2019-12-15 | End: 2019-12-18

## 2019-12-15 RX ORDER — METOPROLOL TARTRATE 50 MG
25 TABLET ORAL
Refills: 0 | Status: DISCONTINUED | OUTPATIENT
Start: 2019-12-15 | End: 2019-12-18

## 2019-12-15 RX ADMIN — SODIUM CHLORIDE 1000 MILLILITER(S): 9 INJECTION INTRAMUSCULAR; INTRAVENOUS; SUBCUTANEOUS at 07:15

## 2019-12-15 RX ADMIN — Medication 1000 MILLIGRAM(S): at 05:36

## 2019-12-15 RX ADMIN — Medication 0.5 MILLIGRAM(S): at 12:31

## 2019-12-15 RX ADMIN — Medication 0.5 MILLIGRAM(S): at 05:36

## 2019-12-15 RX ADMIN — GABAPENTIN 100 MILLIGRAM(S): 400 CAPSULE ORAL at 21:17

## 2019-12-15 RX ADMIN — HEPARIN SODIUM 5000 UNIT(S): 5000 INJECTION INTRAVENOUS; SUBCUTANEOUS at 16:44

## 2019-12-15 RX ADMIN — SODIUM CHLORIDE 1000 MILLILITER(S): 9 INJECTION INTRAMUSCULAR; INTRAVENOUS; SUBCUTANEOUS at 06:45

## 2019-12-15 RX ADMIN — FAMOTIDINE 20 MILLIGRAM(S): 10 INJECTION INTRAVENOUS at 12:31

## 2019-12-15 RX ADMIN — ATORVASTATIN CALCIUM 40 MILLIGRAM(S): 80 TABLET, FILM COATED ORAL at 21:17

## 2019-12-15 RX ADMIN — Medication 25 MILLIGRAM(S): at 16:45

## 2019-12-15 RX ADMIN — SERTRALINE 50 MILLIGRAM(S): 25 TABLET, FILM COATED ORAL at 16:45

## 2019-12-15 RX ADMIN — Medication 0.5 MILLIGRAM(S): at 21:22

## 2019-12-15 RX ADMIN — Medication 1000 MILLIGRAM(S): at 07:15

## 2019-12-15 NOTE — ED PROVIDER NOTE - SECONDARY DIAGNOSIS.
Closed fracture of multiple ribs of both sides, initial encounter Compression fracture of thoracic vertebra, closed, initial encounter

## 2019-12-15 NOTE — ED ADULT NURSE REASSESSMENT NOTE - COMFORT CARE
assisted to bedpan/repositioned
Pt unable to urinate.  Straight cath for urine specimen./repositioned

## 2019-12-15 NOTE — ED PROVIDER NOTE - CLINICAL SUMMARY MEDICAL DECISION MAKING FREE TEXT BOX
Patient fell at home unwitnessed fall off commode to the floor.  Today has small skin tears which were dressed with gauze.  Has been having frequent falls and having headaches and rib pain from prior fall.  Will do medical workup and will get imaging of head, neck, chest, abdomen/pelvis and include recons of the spine.  Will likely need admission.

## 2019-12-15 NOTE — H&P ADULT - HISTORY OF PRESENT ILLNESS
HPI: The patient is an 80 yo female with Hx. LS stenosis, gait instability, multiple falls who fell at home 2 weeks ago, called police to get her up but refused to go to the Hospital. She fell again  last night while on the commode. Daughter called EMS. She sustained a skin tear on  L upper arm and LLE. She was evaluated by Trauma surgeon in the ED , no surgical intervention needed. CTH shows ventriculomegaly slightly out of proportion with degree of sulcal prominence. Correlate clinically for presence of normal pressure       PMHx:Lumbago, LSpinal stenosis, HLD, HTN, HLD,Osteoporosis,     PSHx: MARIA C, lumbar nerve ablation,     Family Hx:Father  at age 62 of MI, Mother  of old age at 91.    Social Hx.: not smoking, no alcohol use    ROS:   Eyes: no changes in vision    ENT/Mouth: no changes    Cardiovascular: no chest pain    Respiratory: no SOB    Gastrointestinal: no diarrhea, no nausea, no vomiting    Genitourinary: no dysuria    Breast: no pain    Musculoskeletal: low back pain     Integumentary: no itching    Neurological: No Headache, no tremor,    Psychiatric: no suicidal ideations    Endocrine: no excessive thirst,     Hematologic/Lymphatic: no swollen glands    Allergic/Immunologic: no itching      Physical Exam: Vital Signs Last 24 Hrs  T(C): 36.3 (15 Dec 2019 12:22), Max: 36.7 (15 Dec 2019 07:57)  T(F): 97.3 (15 Dec 2019 12:22), Max: 98 (15 Dec 2019 07:57)  HR: 79 (15 Dec 2019 12:22) (69 - 81)  BP: 163/43 (15 Dec 2019 12:22) (137/53 - 164/77)  BP(mean): --  RR: 17 (15 Dec 2019 12:22) (14 - 18)  SpO2: 100% (15 Dec 2019 12:22) (98% - 100%)        HEENT: PRRL EOMI    MOUTH/TEETH/GUMS: Clear    NECK: no JVD    LUNGS: Clear    HEART: S1,S2 RR    ABDOMEN: soft nontender    EXTREMITIES:  trace bilateral pedal edema    MUSCULOSKELETAL: no joint swelling     NEURO: no tremor, no focal signs.    SKIN: skin tear L shin, L arm    : CVA negative,     Lab:                      10.3   13.71 )-----------( 291      ( 15 Dec 2019 05:22 )             32.1       12-15    141  |  110<H>  |  45<H>  ----------------------------<  95  4.3   |  26  |  1.17    Ca    8.8      15 Dec 2019 05:22  Mg     2.3     12-15    TPro  6.5  /  Alb  3.3  /  TBili  0.3  /  DBili  x   /  AST  12<L>  /  ALT  23  /  AlkPhos  90  12-15  CT Chest,abd,pelv.:   Subacute appearing fractures of the left anterior fourth, fifth, and   sixth ribs. Multiple chronic appearing right-sided rib fractures.  Indeterminate age fracture of the right pubic ramus. Chronic bilateral   sacral alar fractures and chronic fracture of the left pubic ramus.  Indeterminate age mild compression deformities of the T3, T4, T5, and T6   vertebral bodies, most severe at T6.Chronic compression deformity of L3 with bony retropulsion resulting in moderate to severe segmental canal stenosis.Cluster of mildly enlarged gastrohepatic lymph nodes of indeterminate etiology.    CTH: Ventriculomegaly slightly out of proportion with degree of sulcal   prominence. Correlate clinically for presence of normal pressure   hydrocephalus.

## 2019-12-15 NOTE — ED PROVIDER NOTE - SKIN, MLM
Skin dry , slightly pale; +superficial skin tear of left upper arm measuring 4cm; +1cm superficial skin tear of left anterior lower leg

## 2019-12-15 NOTE — ED PROVIDER NOTE - CARE PLAN
Principal Discharge DX:	Ataxia Principal Discharge DX:	Ataxia  Secondary Diagnosis:	Closed fracture of multiple ribs of both sides, initial encounter  Secondary Diagnosis:	Compression fracture of thoracic vertebra, closed, initial encounter

## 2019-12-15 NOTE — ED ADULT TRIAGE NOTE - CHIEF COMPLAINT QUOTE
s/p mechanical fall, patient states she falls often due to spinal stenosis.  patient states she fell this evening getting back into her chair, hitting left shin.  patient is on ASA but denies head injury. GCS 15.

## 2019-12-15 NOTE — ED PROVIDER NOTE - CARDIAC, MLM
Normal rate, regular rhythm.  Heart sounds S1, S2.  No murmurs, rubs or gallops.  Left lateral ribs with mild tenderness. no crepitus

## 2019-12-15 NOTE — ED ADULT NURSE NOTE - NSIMPLEMENTINTERV_GEN_ALL_ED
Implemented All Fall with Harm Risk Interventions:  [place] to call system. Call bell, personal items and telephone within reach. Instruct patient to call for assistance. Room bathroom lighting operational. Non-slip footwear when patient is off stretcher. Physically safe environment: no spills, clutter or unnecessary equipment. Stretcher in lowest position, wheels locked, appropriate side rails in place. Provide visual cue, wrist band, yellow gown, etc. Monitor gait and stability. Monitor for mental status changes and reorient to person, place, and time. Review medications for side effects contributing to fall risk. Reinforce activity limits and safety measures with patient and family. Provide visual clues: red socks.

## 2019-12-15 NOTE — ED PROVIDER NOTE - OBJECTIVE STATEMENT
Pt. is a 78 yo F with a hx of HTN, hyperlipidemia, osteoporosis, spinal stenosis, chronic pain, depression BIB ambulance for unwitnessed fall at home.  Pt. states she is awaiting getting into assited living as she has been having frequent falls and while urinating on commode last night she fell off commode onto left side.  Pt. sustained skin tear of left upper arm and left lower leg.  Pt. takes aspirin.  Pt. denies head injury or LOC, but states she fell 1 month ago and hit her head.  She has been having constant dull headaches and taking excedrin with some relief.  Pt. also states after fall last night she has right sided neck pain and low back pain.  She has left sided rib pain over the past month as well from a prior fall.  Daughter at bedside states patient has home health aide a few times a week, a hospital bed in the home and a commode, but when she is alone she is at risk for falling. PMD: Caleb

## 2019-12-15 NOTE — ED PROVIDER NOTE - MUSCULOSKELETAL, MLM
Spine appears normal, range of motion is not limited, +left mid humerus tenderness without deformity.

## 2019-12-15 NOTE — H&P ADULT - ASSESSMENT
80 yo female with Hx. LS stenosis, gait instability, multiple falls who fell at home 2 weeks ago, called police to get her up but refused to go to the Hospital. She fell again  last night while on the commode. Daughter called EMS. She sustained a skin tear on  L upper arm and LLE. She was evaluated by Trauma surgeon in the ED , no surgical intervention needed. CTH shows ventriculomegaly slightly out of proportion with degree of sulcal prominence. Correlate clinically for presence of normal pressure     assessment Dx:                       1. Ataxia  with multiple falls                       2. Rib fractures                       3. NPH r/o                       4. Lumbar spinal stenosis L3                       5. Lumbago    Plan:    admit to medicine                medications: as per med rec.                VTEP: heparin                Labs: cbc, bmp               Radiology: as above                Cardiac diagnostics: EKG               Consults: Neurosurgery, neurology , PT.  the patient wants to got to Rehab.

## 2019-12-15 NOTE — CONSULT NOTE ADULT - SUBJECTIVE AND OBJECTIVE BOX
[facility]                                                           Orthopedic Spine Consultation    CHIEF COMPLAINT: lower extremities weakness    HPI: 79 year old female consulted s/p fall with compression fractures noted on CT scan. Pt seen resting in bed with daughter (Xenia) at bedside. Pt has a hx of chronic back pain since >30 years. She states the last 3 years pain is progressively worse and had sciatica of b/l lower extremities at that time. According to Daughter sciatica improved, but lower extremities weakness persists. Pt states the last 2 months lower extremities weakness is worsening. She had multiple falls since 2 weeks her last fall was yesterday. She states she cannot stand >3 mins which will cause weakness of b/l lower extremities. Pt denies bowel and bladder dysfunction. No recent treatment.  Pt denies cervical pain. Pt has imbalance.       PAST MEDICAL & SURGICAL HISTORY:  Gall stones  Osteoporosis  Spinal stenosis  Spinal stenosis of lumbar region, unspecified whether neurogenic claudication present  Hypertension, unspecified type  Hyperlipidemia, unspecified hyperlipidemia type  Closed compression fracture of second lumbar vertebra, sequela  Chronic back pain, unspecified back location, unspecified back pain laterality  H/O prior ablation treatment: lumbar area  Status post epidural steroid injection: lumbar      SOCIAL HISTORY:    REVIEW OF SYSTEMS:    NECK: See HPI  GENITOURINARY: No dysuria, frequency, hematuria  NEUROLOGICAL: See HPI  MUSCULOSKELETAL: See HPI  PSYCHIATRIC: No depression, anxiety, mood swings, or difficulty sleeping    Vital Signs Last 24 Hrs  T(C): 36.7 (15 Dec 2019 10:44), Max: 36.7 (15 Dec 2019 07:57)  T(F): 98 (15 Dec 2019 10:44), Max: 98 (15 Dec 2019 07:57)  HR: 69 (15 Dec 2019 10:44) (69 - 81)  BP: 143/49 (15 Dec 2019 10:44) (137/53 - 164/77)  BP(mean): --  RR: 18 (15 Dec 2019 10:44) (14 - 18)  SpO2: 100% (15 Dec 2019 10:44) (98% - 100%)  I&O's Detail      LABS:                        10.3   13.71 )-----------( 291      ( 15 Dec 2019 05:22 )             32.1     12-15    141  |  110<H>  |  45<H>  ----------------------------<  95  4.3   |  26  |  1.17    Ca    8.8      15 Dec 2019 05:22  Mg     2.3     12-15    TPro  6.5  /  Alb  3.3  /  TBili  0.3  /  DBili  x   /  AST  12<L>  /  ALT  23  /  AlkPhos  90  12-15    PT/INR - ( 15 Dec 2019 05:22 )   PT: 10.5 sec;   INR: 0.95 ratio         PTT - ( 15 Dec 2019 05:22 )  PTT:29.5 sec  Urinalysis Basic - ( 15 Dec 2019 07:44 )    Color: Yellow / Appearance: Clear / S.010 / pH: x  Gluc: x / Ketone: Negative  / Bili: Negative / Urobili: Negative mg/dL   Blood: x / Protein: Negative mg/dL / Nitrite: Negative   Leuk Esterase: Negative / RBC: x / WBC x   Sq Epi: x / Non Sq Epi: x / Bacteria: x        RADIOLOGY & ADDITIONAL STUDIES:    PHYSICAL EXAM:  Constitutional: NAD, well-groomed, well-developed  Extremities:   Vascular: 2+ peripheral pulses  Psychiatric: Normal mood, normal affect  Cervical: non-tender to palpation  Lumbar: non-tender to palpation.   Neurological: A/O x 3              Sensation: [X ] intact to light touch  [ ] decreased:          Motor exam: [X  ]          [X ] Upper extremity    Delt      Bicp       Tri      Wrist ext  Wrst Flex       Digit Ext Digit Flex                                     R         5/5        5/5        5/5       5/5            5/5            5/5       5/5          5/5                                     L          5/5        5/5        5/5       5/5            5/5            5/5       5/5          5/5         [X ] Lower ext.     Hip Flx  Hip Ext   Hip Abd  Hip Add Quad   Hamstrg   TA       EHL      GS                              R        5/5        5/5        5/5             5/5        5/5        5/5        5/5     5/5      5/5                              L         5/5        5/5        5/5             5/5        5/5        5/5        5/5     5/5      5/5                                                        negative SLR bilaterally         CT abd: Subacute appearing fractures of the left anterior fourth, fifth, and   sixth ribs. Multiple chronic appearing right-sided rib fractures.    Indeterminate age fracture of the right pubic ramus. Chronic bilateral   sacral alar fractures and chronic fracture of the left pubic ramus.    Indeterminate age mild compression deformities of the T3, T4, T5, and T6   vertebral bodies, most severe at T6.    Chronic compression deformity of L3 with bony retropulsion resulting in   moderate to severe segmental canal stenosis.    Cluster of mildly enlarged gastrohepatic lymph nodes of indeterminate   etiology.      A/P :  - Multiple compression fractures. Pt with hx of chronic back pain with progressive worsening of weakness of lower extremities    - MRI of the thoracic and lumbar spine recommended  - Neurology consult recommended for evaluation normal pressure hydrocephalus  - Further treatment plan pending MR studies  - Discussed case with Dr. Cooper.

## 2019-12-15 NOTE — CONSULT NOTE ADULT - ASSESSMENT
79 year old female s/p multiple falls over last month, questionable normal pressure hydrocephalus, unsteady gait    - Recommend medical admission for work up of unsteady gait and questionable normal pressure hydrocephalus  - Diet as tolerated  - Orthopedic and spine eval for multiple age indeterminate fractures, likely chronic given patients history and knowledge of multiple prior fractures  - Recommend neuro eval for normal pressure hydrocephalus  - No acute injuries requiring trauma admission at this time, however patient is unsafe discharge to home where she lives alone  - Recommend PT    Discussed with Dr. Adams

## 2019-12-15 NOTE — ED PROVIDER NOTE - PROGRESS NOTE DETAILS
CTs reviewed which show subacute and age indeterminate CTs reviewed which show subacute and age indeterminate spinal compression fractures and sacral fractures.  Surgery resident contacted for trauma surgery eval, pages sent out to ortho, neurology and spine surgery.  Manuel Blair, DO Pt evaluated by trauma surgery and cleared for medical admission.  Spoke with Dr. Lisa of spine surgery who will be consulted by medicine.  Orthopedics aware of patient.  [facility] contacted prior to admission.  Admission accepted by Dr. Austin.   Maunel Blair DO

## 2019-12-15 NOTE — PROGRESS NOTE ADULT - SUBJECTIVE AND OBJECTIVE BOX
Patient seen and examined  Chart reviewed  PERFECTO Quevedo note reviewed    78 yo female with long hx of LBP and sciatica  Had undergone extensive conservative, no-op care over the last few years  This has included MARIA C, facet blocks, meds and PT    She was indicated for surgery - she decline    She has no axial LBP now    CT scan and MRI reviewed    Old T6 and L3 fractures.    Multiple severe disc space collapse and advanced DDD  Severe stenosis at L2-3 > L3-4  No acute changes     - Advanced DDD   - No acute fx   - main issues are progressive gait imbalance   - No real sciatic pain   - Neuro exam is non-focal   - Unclear etiology of progressive gait issues - could be secondary lumbar stenosis   - SUGGEST NEUROLOGY CONSULT FOR ASSESSMENT OF GAIT    - She is not interested in surgery   - Short term rehab may be appropriate    JENY Cooper MD

## 2019-12-15 NOTE — CONSULT NOTE ADULT - SUBJECTIVE AND OBJECTIVE BOX
Pt is a 80 yo F with a hx of HTN, hyperlipidemia, osteoporosis, spinal stenosis, chronic pain, depression BIB ambulance for unwitnessed fall at home.  Pt. states she is awaiting getting into assited living as she has been having frequent falls and while urinating on commode last night she fell off commode onto left side.  Pt. sustained skin tear of left upper arm and left lower leg.  Pt. takes aspirin.  Pt. denies head injury or LOC, but states she fell 1 month ago and hit her head.  She has been having constant dull headaches and taking excedrin with some relief.  Pt. also states after fall last night she has right sided neck pain and low back pain.  She has left sided rib pain over the past month as well from a prior fall.  Daughter at bedside states patient has home health aide a few times a week, a hospital bed in the home and a commode, but when she is alone she is at risk for falling. She states that she lives alone at home and does not feel safe being there alone anymore. Patient currently with no complaint of any significant pain, but came to ED upon police request after fall given unsafe living circumstances.      Vital Signs Last 24 Hrs  T(C): 36.7 (15 Dec 2019 07:57), Max: 36.7 (15 Dec 2019 07:57)  T(F): 98 (15 Dec 2019 07:57), Max: 98 (15 Dec 2019 07:57)  HR: 70 (15 Dec 2019 07:57) (70 - 81)  BP: 156/82 (15 Dec 2019 07:57) (137/53 - 164/77)  BP(mean): --  RR: 18 (15 Dec 2019 07:57) (14 - 18)  SpO2: 98% (15 Dec 2019 07:57) (98% - 100%)    PHYSICAL EXAM:   · CONSTITUTIONAL: Well appearing, awake, alert, oriented to person, place, time/situation and in no apparent distress.	  · ENMT: Airway patent, Nasal mucosa clear. Mouth with normal mucosa. Throat has no vesicles, no oropharyngeal exudates and uvula is midline.	  · EYES: Clear bilaterally, pupils equal, round and reactive to light.	  · CARDIAC: Normal rate, regular rhythm.  Heart sounds S1, S2.  No murmurs, rubs or gallops.  Left lateral ribs with mild tenderness. no crepitus	  · RESPIRATORY: Breath sounds clear and equal bilaterally.	  · GASTROINTESTINAL: Abdomen obese, soft, non-tender, no guarding.	  · MUSCULOSKELETAL: Spine appears normal, range of motion is not limited, +left mid humerus tenderness without deformity.	  · NEUROLOGICAL: Alert and oriented, no focal deficits, no motor or sensory deficits.	  · SKIN: Skin dry , slightly pale; +superficial skin tear of left upper arm measuring 4cm; +1cm superficial skin tear of left anterior lower leg	  · PSYCHIATRIC: Alert and oriented to person, place, time/situation. normal mood and affect. no apparent risk to self or others.	  · HEME LYMPH: No adenopathy or splenomegaly. No cervical or inguinal lymphadenopathy. +peripheral edema	    < from: CT Chest w/ IV Cont (12.15.19 @ 06:52) >    EXAM:  CT ABDOMEN AND PELVIS IC                          EXAM:  CT CHEST IC                            PROCEDURE DATE:  12/15/2019          INTERPRETATION:  CLINICAL INFORMATION: Status post fall with left rib   pain.    COMPARISON: CT of the pelvis and lumbar spine from 7/30/2018.    PROCEDURE:   CT of the Chest, Abdomen and Pelvis was performed with intravenous   contrast.   Intravenous contrast: 90 ml Omnipaque 350. 10 ml discarded.  Oral contrast: None.  Sagittal and coronal reformats were performed.    FINDINGS:    CHEST:     LUNGS AND LARGE AIRWAYS: Patent central airways. Mild biapical proximal   scarring. Bibasilar dependent and platelike atelectasis. Subcentimeter   nonspecific groundglass opacity in the right middle lobe. No denselung   consolidation, suspicious nodule, or mass.  PLEURA: No pleural effusion, hemothorax, or pneumothorax.  VESSELS: Atherosclerotic calcifications of the thoracic aorta, great   vessels, and coronary arteries. Normal caliber thoracic aorta and main   pulmonary artery.  HEART: Heart is borderline enlarged. No pericardial effusion.  MEDIASTINUM AND JA: No lymphadenopathy. No mediastinal hematoma.  CHEST WALL AND LOWER NECK: Within normal limits.    ABDOMEN AND PELVIS:    LIVER: Within normal limits.  BILE DUCTS: Normal caliber.  GALLBLADDER: Within normal limits.  SPLEEN: Within normal limits.  PANCREAS: Within normal limits.  ADRENALS: Within normal limits.  KIDNEYS/URETERS: Kidneys enhance symmetrically without hydronephrosis.   Bilateral renal cysts and subcentimeter low-attenuation lesions that are   too small to characterize.    BLADDER: Within normal limits.  REPRODUCTIVE ORGANS: Calcified uterine fibroid. Adnexa are grossly   unremarkable.    BOWEL: Small hiatus hernia. Stomach is underdistended limiting evaluation   of wall thickening. No bowel obstruction or overt bowel wall thickening.   Appendicoliths in an otherwise normal appendix.  PERITONEUM: No ascites, pneumoperitoneum, or hemoperitoneum. No evidence   of a mesenteric hematoma.  VESSELS: Within normal limits.  RETROPERITONEUM/LYMPH NODES: Cluster of mildly enlarged gastrohepatic   lymph nodes; representative lymph node measuring up to 1.6 x 1.7 cm.    ABDOMINAL WALL: Small fat-containing right inguinal hernia. Small fat and   fluid containing left inguinal hernia.  BONES: Subacute appearing fractures of the left anterior fourth, fifth,   and sixth ribs. Multiple chronic appearing right-sided rib fractures.   Chronic bilateral sacral alar fractures and chronic fracture of the left   pubic ramus. Indeterminant age fracture of the right pubic ramus.   Indeterminate age mild compression deformities of T3, T4, T5, and T6,   most severe at T6. Chronic compression deformity of L3 with bony   retropulsion resulting in moderate to severe segmental canal stenosis..    IMPRESSION:     Subacute appearing fractures of the left anterior fourth, fifth, and   sixth ribs. Multiple chronic appearing right-sided rib fractures.    Indeterminate age fracture of the right pubic ramus. Chronic bilateral   sacral alar fractures and chronic fracture of the left pubic ramus.    Indeterminate age mild compression deformities of the T3, T4, T5, and T6   vertebral bodies, most severe at T6.    Chronic compression deformity of L3 with bony retropulsion resulting in   moderate to severe segmental canal stenosis.    Cluster of mildly enlarged gastrohepatic lymph nodes of indeterminate   etiology.                    LEVY RILEY D.O. ATTENDING RADIOLOGIST  This documenthas been electronically signed. Dec 15 2019  7:23AM                < end of copied text >    < from: CT Head No Cont (12.15.19 @ 06:45) >    EXAM:  CT CERVICAL SPINE                          EXAM:  CT BRAIN                            PROCEDURE DATE:  12/15/2019          INTERPRETATION:  CLINICAL INFORMATION: Status post fall with trauma and   neck pain.    COMPARISON: None available.    PROCEDURE:   Noncontrast CT of the head and cervical spine. Coronal and Sagittal   reformats were obtained.    FINDINGS:    CT head:    There is no acute intracranial hemorrhage, mass effect, midline shift,   extra-axial collection, or evidence of acute vascular territorial   infarction. Mild patchy hypodensities within the periventricular and   subcortical white matter, although nonspecific, likely reflect chronic   microvascular disease. Ventriculomegaly, slightly out of portion with   degree of sulcal prominence.    The visualized paranasal sinuses and mastoid air cells are clear. Status   post bilateral cataract surgery. No calvarial fracture.    CT cervical spine:    No acute cervical spine fracture or evidence of traumatic malalignment.  Mild nonspecific lateral atlantodental interval asymmetry, may be related   to patient head positioning. Straightening of normal cervical lordosis,   nonspecific. No facet joint dislocation. No prevertebral soft tissue   swelling.    There are multilevel degenerative changes of the spine characterized by   degenerative disc disease as well as bilateral uncovertebral and facet   joint arthrosis contributing to varying degrees of neuroforaminal and   spinal canal stenoses. Neuroforaminal narrowing most prominent at the   C3-C4 level on the left C4-C5 level on the right, C5-C6 level on the   right and C6-C7 level on the left. Multilevel spinal canal stenosis,   severe at the C3-C4 level with cord compression. Additional multilevel   stenoses are not well evaluated on this examination.    Visualized lung apices demonstrate pleural parenchymal scarring.   Nonspecific tubular opacity within the posterior aspect of the proximal   intrathoracic trachea, may represent mucous. Atelectasis stenosis of the   bilateral carotid bifurcations.    IMPRESSION:     CT Head: No acute intracranial hemorrhage or calvarial fracture.    Ventriculomegaly slightly out of proportion with degree of sulcal   prominence. Correlate clinically for presence of normal pressure   hydrocephalus.    CT cervical spine: No acute cervical spine fracture or evidence of   traumatic malalignment. Multilevel cervical spondylosis contributing to   varying degrees of severe neuroforaminal and spinal canal stenoses.                JULIAN MCMAHON M.D., ATTENDING RADIOLOGIST  This document has been electronically signed. Dec 15 2019  7:09AM                < end of copied text >

## 2019-12-15 NOTE — PATIENT PROFILE ADULT - BRADEN ACTIVITY
Urologist - No answer on [phone] after several minute wait.  Called [phone].  Spoke with Frank Hollis  01/09/18 2202     (1) bedfast

## 2019-12-15 NOTE — ED ADULT NURSE NOTE - OBJECTIVE STATEMENT
mechanical fall, + asa, hit head per daughter; hx spinal stenosis which is causing her to have increasing falls- pt moving into assisted living facility this week due to her worsening unsteady gait and falls

## 2019-12-15 NOTE — CONSULT NOTE ADULT - ATTENDING COMMENTS
79F on ASA, fall from standing this morning, no LOC.  seen and examined 12-15-19 @ 0925 as trauma consult.    Recurrent falls.  Her prior fall was 2 weeks ago after which she had right chest wall pain but did not seek medical attention. The pain has since resolved.  She has spinal stenosis for 3 years causing progressive bilateral lower extremity weakness.  Also has episodes of confusion and urinary incontinence.    quit smoking many years ago  quit drinking alcohol many years ago    GCS = 15  36.7  156/82  70  18  98% on room air    NC/AT  PERRL  no c-spine tenderness or limit in full active ROM  RRR w/ 2/6 holosystolic murmur loudest at apex  CTA bilat  no chest wall tenderness  soft / NT / ND  no T-spine or L-spine tenderness  5/5 strength x 4 extremities  2+ right DP pulse  1+ left DP pulse  left lateral arm with 6 cm skin tear  left anteromedial leg with 2 cm skin tear    CT head / c-spine - hydrocephalus  CT chest / abd/pelvis w/ contrast - subacute left 4-6 anterior nondisplaced rib fractures. chronic bilateral lateral compression pelvic fractures. T3-6 and L3 vertebral compression deformities. bilateral renal cysts.  left humerus X-ray - no fracture  left tib/fib X-ray - no fracture      recurrent falls  -neurology consult to evaluate for normal pressure hydrocephalus  -physical therapy evaluation for spinal stenosis for which she has refused surgery in the past  -avoid benzodiazepines if possible    subacute left 4-6 anterior nondisplaced rib fractures  chronic bilateral lateral compression pelvic fractures  -no further management    left arm and leg skin tears  -wound care with nonadherent dressings

## 2019-12-15 NOTE — ED PROVIDER NOTE - NS ED MD DISPO SPECIAL CONSIDERATION1
ASSESSMENT/PLAN:    Diagnoses and all orders for this visit:    Contusion of left forearm, subsequent encounter        Plan:  I will like to see him in 3 weeks  He seems to be doing quite well but is noting some persistent paresthesias in the left arm on an occasional basis, perhaps once a day  Although this would be somewhat unusual, I would like to make sure that the symptoms resolved before discontinuing his care and closing his workman's comp case  If symptoms do not resolve, an EMG may be warranted  He may work without restrictions at this time  Return in about 3 weeks (around 12/5/2019)       _____________________________________________________  CHIEF COMPLAINT:  Chief Complaint   Patient presents with    Left Arm - Follow-up, Pain, Swelling    foream injury     pt states continue to have swelling to foream, but also noticed the hand felt numbnes and sharp tingling sensation to finger  SUBJECTIVE:  Ajay Rios is a 30y o  year old male who presents for follow up of his left forearm contusion/crush injury  He has noted improvement and experiences only occasional pain, such as when he is trying to pull on 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 truck  He also experiences occasional paresthesias, occurring perhaps once a day, involving the forearm and hand  These last for 2 or 3 minutes before resolving        PAST MEDICAL HISTORY:  Past Medical History:   Diagnosis Date    Forearm injury, left, subsequent encounter        PAST SURGICAL HISTORY:  Past Surgical History:   Procedure Laterality Date    FINGER AMPUTATION         FAMILY HISTORY:  Family History   Problem Relation Age of Onset    No Known Problems Mother     No Known Problems Father     No Known Problems Brother     No Known Problems Brother     No Known Problems Brother        SOCIAL HISTORY:  Social History     Tobacco Use    Smoking status: Current Every Day Smoker     Packs/day: 0 25     Types: Cigarettes    Smokeless tobacco: Never Used   Substance Use Topics    Alcohol use: Yes     Frequency: Monthly or less     Drinks per session: 1 or 2     Comment: occasionally    Drug use: Never       MEDICATIONS:    Current Outpatient Medications:     albuterol (VENTOLIN HFA) 90 mcg/act inhaler, Inhale 2 puffs every 6 (six) hours as needed for wheezing, Disp: 18 g, Rfl: 0    ALLERGIES:  No Known Allergies    REVIEW OF SYSTEMS:  Pertinent items are noted in HPI  A comprehensive review of systems was negative       _____________________________________________________  PHYSICAL EXAMINATION:  General: alert, oriented times 3 and appears comfortable  Psychiatric: Normal  HEENT:  Normocephalic, atraumatic  Cardiovascular:  Regular  Pulmonary: No wheezing or stridor  Skin: No masses, erthema, lacerations, fluctation, ulcerations  Neurovascular: Motor and sensory exams are grossly intact today  Good color and capillary refill is noted and pulses are palpable  MUSCULOSKELETAL EXAMINATION:    The left upper extremity exam demonstrates excellent range of motion at the elbow, forearm, wrist and hand without complaint  He has no tenderness to palpation throughout the forearm  Excellent strength was noted without complaint  There is no swelling noted          [facility] Fall Precaution

## 2019-12-16 LAB
ANION GAP SERPL CALC-SCNC: 6 MMOL/L — SIGNIFICANT CHANGE UP (ref 5–17)
APPEARANCE UR: CLEAR — SIGNIFICANT CHANGE UP
BASOPHILS # BLD AUTO: 0 K/UL — SIGNIFICANT CHANGE UP (ref 0–0.2)
BASOPHILS NFR BLD AUTO: 0 % — SIGNIFICANT CHANGE UP (ref 0–2)
BILIRUB UR-MCNC: NEGATIVE — SIGNIFICANT CHANGE UP
BUN SERPL-MCNC: 30 MG/DL — HIGH (ref 7–23)
CALCIUM SERPL-MCNC: 8.9 MG/DL — SIGNIFICANT CHANGE UP (ref 8.5–10.1)
CHLORIDE SERPL-SCNC: 111 MMOL/L — HIGH (ref 96–108)
CO2 SERPL-SCNC: 24 MMOL/L — SIGNIFICANT CHANGE UP (ref 22–31)
COLOR SPEC: YELLOW — SIGNIFICANT CHANGE UP
CREAT SERPL-MCNC: 1.1 MG/DL — SIGNIFICANT CHANGE UP (ref 0.5–1.3)
CULTURE RESULTS: NO GROWTH — SIGNIFICANT CHANGE UP
DIFF PNL FLD: ABNORMAL
EOSINOPHIL # BLD AUTO: 2.38 K/UL — HIGH (ref 0–0.5)
EOSINOPHIL NFR BLD AUTO: 18 % — HIGH (ref 0–6)
GLUCOSE SERPL-MCNC: 97 MG/DL — SIGNIFICANT CHANGE UP (ref 70–99)
GLUCOSE UR QL: NEGATIVE MG/DL — SIGNIFICANT CHANGE UP
HCT VFR BLD CALC: 29.8 % — LOW (ref 34.5–45)
HGB BLD-MCNC: 9.7 G/DL — LOW (ref 11.5–15.5)
KETONES UR-MCNC: NEGATIVE — SIGNIFICANT CHANGE UP
LEUKOCYTE ESTERASE UR-ACNC: ABNORMAL
LYMPHOCYTES # BLD AUTO: 1.98 K/UL — SIGNIFICANT CHANGE UP (ref 1–3.3)
LYMPHOCYTES # BLD AUTO: 15 % — SIGNIFICANT CHANGE UP (ref 13–44)
MCHC RBC-ENTMCNC: 31.5 PG — SIGNIFICANT CHANGE UP (ref 27–34)
MCHC RBC-ENTMCNC: 32.6 GM/DL — SIGNIFICANT CHANGE UP (ref 32–36)
MCV RBC AUTO: 96.8 FL — SIGNIFICANT CHANGE UP (ref 80–100)
MONOCYTES # BLD AUTO: 0.66 K/UL — SIGNIFICANT CHANGE UP (ref 0–0.9)
MONOCYTES NFR BLD AUTO: 5 % — SIGNIFICANT CHANGE UP (ref 2–14)
NEUTROPHILS # BLD AUTO: 8.2 K/UL — HIGH (ref 1.8–7.4)
NEUTROPHILS NFR BLD AUTO: 62 % — SIGNIFICANT CHANGE UP (ref 43–77)
NITRITE UR-MCNC: POSITIVE
NRBC # BLD: SIGNIFICANT CHANGE UP /100 WBCS (ref 0–0)
PH UR: 6.5 — SIGNIFICANT CHANGE UP (ref 5–8)
PLATELET # BLD AUTO: 291 K/UL — SIGNIFICANT CHANGE UP (ref 150–400)
POTASSIUM SERPL-MCNC: 4.8 MMOL/L — SIGNIFICANT CHANGE UP (ref 3.5–5.3)
POTASSIUM SERPL-SCNC: 4.8 MMOL/L — SIGNIFICANT CHANGE UP (ref 3.5–5.3)
PROT UR-MCNC: NEGATIVE MG/DL — SIGNIFICANT CHANGE UP
RBC # BLD: 3.08 M/UL — LOW (ref 3.8–5.2)
RBC # FLD: 12.5 % — SIGNIFICANT CHANGE UP (ref 10.3–14.5)
SODIUM SERPL-SCNC: 141 MMOL/L — SIGNIFICANT CHANGE UP (ref 135–145)
SP GR SPEC: 1 — LOW (ref 1.01–1.02)
SPECIMEN SOURCE: SIGNIFICANT CHANGE UP
UROBILINOGEN FLD QL: NEGATIVE MG/DL — SIGNIFICANT CHANGE UP
WBC # BLD: 13.23 K/UL — HIGH (ref 3.8–10.5)
WBC # FLD AUTO: 13.23 K/UL — HIGH (ref 3.8–10.5)

## 2019-12-16 PROCEDURE — 99223 1ST HOSP IP/OBS HIGH 75: CPT

## 2019-12-16 PROCEDURE — 99221 1ST HOSP IP/OBS SF/LOW 40: CPT

## 2019-12-16 RX ORDER — CEFTRIAXONE 500 MG/1
1000 INJECTION, POWDER, FOR SOLUTION INTRAMUSCULAR; INTRAVENOUS EVERY 24 HOURS
Refills: 0 | Status: DISCONTINUED | OUTPATIENT
Start: 2019-12-16 | End: 2019-12-18

## 2019-12-16 RX ORDER — CEFTRIAXONE 500 MG/1
1 INJECTION, POWDER, FOR SOLUTION INTRAMUSCULAR; INTRAVENOUS EVERY 24 HOURS
Refills: 0 | Status: DISCONTINUED | OUTPATIENT
Start: 2019-12-16 | End: 2019-12-16

## 2019-12-16 RX ADMIN — SERTRALINE 50 MILLIGRAM(S): 25 TABLET, FILM COATED ORAL at 13:21

## 2019-12-16 RX ADMIN — HEPARIN SODIUM 5000 UNIT(S): 5000 INJECTION INTRAVENOUS; SUBCUTANEOUS at 18:03

## 2019-12-16 RX ADMIN — Medication 650 MILLIGRAM(S): at 05:50

## 2019-12-16 RX ADMIN — CEFTRIAXONE 1000 MILLIGRAM(S): 500 INJECTION, POWDER, FOR SOLUTION INTRAMUSCULAR; INTRAVENOUS at 18:23

## 2019-12-16 RX ADMIN — Medication 0.5 MILLIGRAM(S): at 05:04

## 2019-12-16 RX ADMIN — Medication 25 MILLIGRAM(S): at 05:04

## 2019-12-16 RX ADMIN — Medication 0.5 MILLIGRAM(S): at 21:06

## 2019-12-16 RX ADMIN — FAMOTIDINE 20 MILLIGRAM(S): 10 INJECTION INTRAVENOUS at 13:21

## 2019-12-16 RX ADMIN — HEPARIN SODIUM 5000 UNIT(S): 5000 INJECTION INTRAVENOUS; SUBCUTANEOUS at 05:02

## 2019-12-16 RX ADMIN — Medication 25 MILLIGRAM(S): at 18:03

## 2019-12-16 RX ADMIN — Medication 650 MILLIGRAM(S): at 04:57

## 2019-12-16 RX ADMIN — ATORVASTATIN CALCIUM 40 MILLIGRAM(S): 80 TABLET, FILM COATED ORAL at 20:37

## 2019-12-16 RX ADMIN — GABAPENTIN 100 MILLIGRAM(S): 400 CAPSULE ORAL at 20:37

## 2019-12-16 NOTE — CONSULT NOTE ADULT - ASSESSMENT
Pt is a very pleasant 79 year old woman with a h/o HTN, HLD, and spinal stenosis she presents to the hospital after a fall at home and is now c/o b/l lower ext weakness.  Pt states she has been falling at lease once a day for some time, usually in the middle of the night when her aide is not present, she ends up call the police department to help her get off the ground. Pt was seen by orthopedic surgery for her long standing spinal stenosis and b/l lower ext weakness- pt is refusions surgery. Neurosurgery was consulted for the findings of possible NPH on the CT of the head.     At time of exam pt is awake and alert, oriented X3 , in no acute distress, mentally pt is still able to manage her own finances, she states she able to hold her urine but occasionally is not able to make it to the bathroom on time. She has long standing spinal stenosis but states she does not want surgery.    79 year old woman with difficulty walking due to b/l lower ext weakness frequent falls with enlarged ventricles on CT of the head.   At this time would not contribute difficulty walking to the diagnosis of NPH and pt is mentally on point and does not have incontinence.   Enlarged ventricle most likely due to age related atrophy.     No acute neurosurgical intervention at this time.   Follow up with NEUROLOGY as an outpatient if the family would like further workup for NPH.

## 2019-12-16 NOTE — PHYSICAL THERAPY INITIAL EVALUATION ADULT - GENERAL OBSERVATIONS, REHAB EVAL
The pt was received on 2N, supine with private hire  present bedside. The pt reported that she was "afraid" about getting up and ambulating due to her recent history of falls.

## 2019-12-16 NOTE — PHYSICAL THERAPY INITIAL EVALUATION ADULT - ADDITIONAL COMMENTS
The pt reports that she normally sleeps in a adjustable reclining with automatic sit to stand function. The pt never goes into her bed for sleep. The pt reports having 2 separate companions present during the day covering a total of 10 hours split between the AM and PM. The pt reports that she is otherwise along during the overnight hours, the pt's dtr is present to assist PRN. The pt reports that she has been having more difficulty walking and more frequent falls over the past 6 months.

## 2019-12-16 NOTE — CONSULT NOTE ADULT - SUBJECTIVE AND OBJECTIVE BOX
79 year old woman with a h/o HTN, HLD, and spinal stenosis she presents to the hospital after a fall at home and is now c/o b/l lower ext weakness.  Pt states she has been falling at lease once a day for some time, usually in the middle of the night when her aide is not present, finds her gait is unsteady, afraid of falling. No associated dizziness, vertigo, finds the left leg/foot is the weaker, due to problems with her back. Hx of lumbar spine stenosis, sciatica type symptoms, not for recent past. No B/B incontinence. no fever.  Pt was seen by orthopedic surgery for her long standing spinal stenosis and b/l lower ext weakness- pt is refusing surgery. Neurosurgery was consulted for the findings of possible NPH on the CT of the head.       ENT/Mouth: no changes    PAST MEDICAL & SURGICAL HISTORY:  Gall stones  Osteoporosis  Spinal stenosis  Spinal stenosis of lumbar region, unspecified whether neurogenic claudication present  Hypertension, unspecified type  Hyperlipidemia, unspecified hyperlipidemia type  Closed compression fracture of second lumbar vertebra, sequela  Chronic back pain, unspecified back location, unspecified back pain laterality  H/O prior ablation treatment: lumbar area  Status post epidural steroid injection: lumbar    FAMILY HISTORY:  No pertinent family history in first degree relatives    Social Hx:  Nonsmoker, no drug or alcohol use  Medications and Allergies ReviewedMEDICATIONS  (STANDING):  atorvastatin 40 milliGRAM(s) Oral at bedtime  cefTRIAXone Injectable. 1 milliGRAM(s) IV Push every 24 hours  famotidine    Tablet 20 milliGRAM(s) Oral daily  gabapentin 100 milliGRAM(s) Oral at bedtime  heparin  Injectable 5000 Unit(s) SubCutaneous every 12 hours  metoprolol tartrate 25 milliGRAM(s) Oral two times a day  sertraline 50 milliGRAM(s) Oral daily     ROS: Pertinent positives in HPI, all other ROS were reviewed and are negative.      Examination:   Vital Signs Last 24 Hrs  T(C): 36.7 (16 Dec 2019 11:26), Max: 36.8 (15 Dec 2019 17:29)  T(F): 98 (16 Dec 2019 11:26), Max: 98.3 (15 Dec 2019 17:29)  HR: 63 (16 Dec 2019 11:26) (63 - 76)  BP: 148/40 (16 Dec 2019 11:26) (125/50 - 148/40)  BP(mean): --  RR: 17 (16 Dec 2019 11:26) (16 - 17)  SpO2: 98% (16 Dec 2019 11:26) (95% - 98%)  General: Cooperative, NAD   NECK: supple, no masses  ENT: Normal hearing   Vascular : no carotid bruits,   Lungs: CTAB  Chest: RRR, no murmurs  Extremities: nontender, no edema  Musculoskeletal: no adventitious movements, no joint stiffness  Skin: no rash    Neurological Examination:  MS: AOx3. Appropriately interactive, normal affect. Speech fluent w/o paraphasic error   CN: VFFTC, PERLL, EOMI, V1-3 sensation intact, face symmetric, hearing intact, palate elevates symmetrically, tongue midline, SCM equal bilaterally  Motor: normal bulk and tone, no tremor, rigidity or bradykinesia.  4+/5 bilteral thigh flexion, otherwise -5/5 all over   Sens: Intact to light touch, Jps.    Reflexes: 1-2/4 all over, downgoing toes b/l  Coord:  No dysmetria, JOSE EDUARDO intact   Gait: Cannot test    Labs: Reviewed  Basic Metabolic Panel (12.16.19 @ 07:24)    Sodium, Serum: 141 mmol/L    Potassium, Serum: 4.8 mmol/L    Chloride, Serum: 111 mmol/L    Carbon Dioxide, Serum: 24 mmol/L    Anion Gap, Serum: 6 mmol/L    Blood Urea Nitrogen, Serum: 30 mg/dL    Creatinine, Serum: 1.10 mg/dL    Glucose, Serum: 97 mg/dL    Calcium, Total Serum: 8.9 mg/dL    eGFR if Non : 47:    Imaging:   < from: CT Cervical Spine No Cont (12.15.19 @ 06:46) >  IMPRESSION:     CT Head: No acute intracranial hemorrhage or calvarial fracture.    Ventriculomegaly slightly out of proportion with degree of sulcal   prominence. Correlate clinically for presence of normal pressure   hydrocephalus.    CT cervical spine: No acute cervical spine fracture or evidence of   traumatic malalignment. Multilevel cervical spondylosis contributing to   varying degrees of severe neuroforaminal and spinal canal stenoses.      < from: CT Abdomen and Pelvis w/ IV Cont (12.15.19 @ 06:53) >  IMPRESSION:     Subacute appearing fractures of the left anterior fourth, fifth, and   sixth ribs. Multiple chronic appearing right-sided rib fractures.    Indeterminate age fracture of the right pubic ramus. Chronic bilateral   sacral alar fractures and chronic fracture of the left pubic ramus.    Indeterminate age mild compression deformities of the T3, T4, T5, and T6   vertebral bodies, most severe at T6.    Chronic compression deformity of L3 with bony retropulsion resulting in   moderate to severe segmental canal stenosis.    Cluster of mildly enlarged gastrohepatic lymph nodes of indeterminate   etiology.    < end of copied text >

## 2019-12-16 NOTE — CONSULT NOTE ADULT - CONSULT REQUESTED BY NAME
Dr. Austin Consent: The patient's consent was obtained including but not limited to risks of crusting, scabbing, blistering, scarring, darker or lighter pigmentary change, recurrence, incomplete removal and infection. Post-Care Instructions: I reviewed with the patient in detail post-care instructions. Patient is to wear sunprotection, and avoid picking at any of the treated lesions. Pt may apply Vaseline to crusted or scabbing areas. Detail Level: Detailed Medical Necessity Information: It is in your best interest to select a reason for this procedure from the list below. All of these items fulfill various CMS LCD requirements except the new and changing color options. Include Z78.9 (Other Specified Conditions Influencing Health Status) As An Associated Diagnosis?: No Render Post-Care Instructions In Note?: yes Medical Necessity Clause: This procedure was medically necessary because the lesions that were treated were: Number Of Freeze-Thaw Cycles: 2 freeze-thaw cycles

## 2019-12-16 NOTE — PHYSICAL THERAPY INITIAL EVALUATION ADULT - DID THE PATIENT HAVE SURGERY?
as per HPI: The patient is an 78 yo female with Hx. LS stenosis, gait instability, multiple falls who fell at home 2 weeks ago, called police to get her up but refused to go to the Hospital. She fell again  last night while on the commode.

## 2019-12-16 NOTE — PHYSICAL THERAPY INITIAL EVALUATION ADULT - PERTINENT HX OF CURRENT PROBLEM, REHAB EVAL
78 yo female with long hx of LBP and sciatica, Had undergone extensive conservative, no-op care over the last few years, including MARIA C, facet blocks, meds and PT. Pt presents with Multiple severe disc space collapse and advanced DDD, Severe stenosis at L2-3 > L3-4, but the pt's main issue is her progressive gait imbalance/ the pt declines surgery as an option. As per ortho Spine consult: Non-focal neuro exam/ unclear etiology of progressive gait issues.

## 2019-12-16 NOTE — CONSULT NOTE ADULT - SUBJECTIVE AND OBJECTIVE BOX
Patient is a 79y old  Female who presents with a chief complaint of Ataxia, L Spinal stenosis, r/o NPH (16 Dec 2019 10:02)    HPI:  Pt is a very pleasant 79 year old woman with a h/o HTN, HLD, and spinal stenosis she presents to the hospital after a fall at home and is now c/o b/l lower ext weakness.  Pt states she has been falling at lease once a day for some time, usually in the middle of the night when her aide is not present, she ends up call the police department to help her get off the ground. Pt was seen by orthopedic surgery for her long standing spinal stenosis and b/l lower ext weakness- pt is refusions surgery. Neurosurgery was consulted for the findings of possible NPH on the CT of the head.     At time of exam pt is awake and alert, oriented X3 , in no acute distress, mentally pt is still able to manage her own finances, she states she able to hold her urine but occasionally is not able to make it to the bathroom on time. She has long standing spinal stenosis but states she does not want surgery. I had a discussion with the patient's daughter who states the pt is going to rehab tomorrow.     PAST MEDICAL & SURGICAL HISTORY:  Gall stones  Osteoporosis  Spinal stenosis of lumbar region, unspecified whether neurogenic claudication present  Hypertension, unspecified type  Hyperlipidemia, unspecified hyperlipidemia type  Closed compression fracture of second lumbar vertebra, sequela  Chronic back pain, unspecified back location, unspecified back pain laterality  H/O prior ablation treatment: lumbar area  Status post epidural steroid injection: lumbar  PSHx: MARIA C, lumbar nerve ablation     FAMILY HISTORY: Twin sister had h/o spinal stenosis      Social Hx:  Nonsmoker, no drug or alcohol use    MEDICATIONS  (STANDING):  atorvastatin 40 milliGRAM(s) Oral at bedtime  famotidine    Tablet 20 milliGRAM(s) Oral daily  gabapentin 100 milliGRAM(s) Oral at bedtime  heparin  Injectable 5000 Unit(s) SubCutaneous every 12 hours  metoprolol tartrate 25 milliGRAM(s) Oral two times a day  sertraline 50 milliGRAM(s) Oral daily     Allergies: No Known Allergies    ROS: Pertinent positives in HPI, all other ROS were reviewed and are negative.      Vital Signs Last 24 Hrs  T(C): 36.6 (16 Dec 2019 04:53), Max: 36.8 (15 Dec 2019 17:29)  T(F): 97.8 (16 Dec 2019 04:53), Max: 98.3 (15 Dec 2019 17:29)  HR: 72 (16 Dec 2019 04:53) (71 - 79)  BP: 145/47 (16 Dec 2019 04:53) (125/50 - 163/43)  RR: 16 (16 Dec 2019 04:53) (16 - 17)  SpO2: 95% (16 Dec 2019 04:53) (95% - 100%)    PHYSICAL EXAM:  Constitutional: awake and alert  HEENT: PERRLA, EOMI  Neck: Supple  Respiratory: Breath sounds are clear bilaterally  Cardiovascular: S1 and S2, irregular rhythm  Gastrointestinal: soft, nontender  Extremities: +b/l lower ext edema   Musculoskeletal: no joint swelling/tenderness, no abnormal movements  Skin: No rashes    Neurological exam:  HF: A x O x 3. Appropriately interactive, normal affect. Speech fluent, No Aphasia or paraphasic errors. Naming /repetition intact   CN: SENTHIL, EOMI, VFF, facial sensation normal, no NLFD, tongue midline, Palate moves equally, SCM equal bilaterally  Motor: No pronator drift, Strength 5/5 in all 4 ext, normal bulk and tone, no tremor, rigidity or bradykinesia.    Sens: Intact to light touch   Reflexes: Symmetric and normal, downgoing toes b/l  Coord: finger to nose intact   Gait/Balance: unable to assess     Labs:                        9.7    13.23 )-----------( 291      ( 16 Dec 2019 07:24 )             29.8     12-16    141  |  111<H>  |  30<H>  ----------------------------<  97  4.8   |  24  |  1.10    Ca    8.9      16 Dec 2019 07:24  Mg     2.3     12-15    TPro  6.5  /  Alb  3.3  /  TBili  0.3  /  DBili  x   /  AST  12<L>  /  ALT  23  /  AlkPhos  90  12-15    PT/INR - ( 15 Dec 2019 05:22 )   PT: 10.5 sec;   INR: 0.95 ratio       PTT - ( 15 Dec 2019 05:22 )  PTT:29.5 sec    RADIOLOGY:  < from: CT Head No Cont (12.15.19 @ 06:45) >  CT Head: No acute intracranial hemorrhage or calvarial fracture.    Ventriculomegaly slightly out of proportion with degree of sulcal   prominence. Correlate clinically for presence of normal pressure hydrocephalus.    CT cervical spine: No acute cervical spine fracture or evidence of   traumatic malalignment. Multilevel cervical spondylosis contributing to   varying degrees of severe neuroforaminal and spinal canal stenoses.

## 2019-12-16 NOTE — CONSULT NOTE ADULT - ASSESSMENT
79 year old woman hx of recurrent falls, exam non focal. CT of head; findings raises  possible NPH.   Reviewed findings OF cT HEAD  with patient, who does not want to do further w/u at this time.   can f/u with me as outpatient WHEN d/c [facility].

## 2019-12-16 NOTE — PROGRESS NOTE ADULT - SUBJECTIVE AND OBJECTIVE BOX
(212)  Patient seen and examined, continues to have similar symptoms of weakness into the lower extremities with standing walking.   Long term condition > 4 years, although gradually getting worse,   Severe stenosis multiple levels as seen on MRI findings.   Spend > >20 min discussing treatement options with patient and daughter,   Patient does not want to consider surgical options at this time.   Recommend rehab placement,  may follow with Dr Cooper outpatient

## 2019-12-16 NOTE — PHYSICAL THERAPY INITIAL EVALUATION ADULT - IMPAIRMENTS CONTRIBUTING TO GAIT DEVIATIONS, PT EVAL
impaired coordination/impaired postural control/During ambulation tx the pt tended to walk outside of her YRIS/ RW with poor AD usage, the pt also tending to list towards the right during prolonged ambulation. The pt was a high falls risk if unsupported/ unassisted at present./decreased flexibility

## 2019-12-16 NOTE — PROGRESS NOTE ADULT - SUBJECTIVE AND OBJECTIVE BOX
c/c: fall    HPI:  HPI: The patient is an 78 yo female with Hx. LS stenosis, gait instability, multiple falls, HTN, HLD,  who fell at home 2 weeks ago, called police to get her up but refused to go to the Hospital. She fell again the night prior while on the commode. Daughter called EMS. She sustained a skin tear on  L upper arm and LLE. She was evaluated by Trauma surgeon in the ED , no surgical intervention needed. CTH shows ventriculomegaly slightly out of proportion with degree of sulcal prominence. Correlate clinically for presence of normal pressure  hydrocephalus. She was admitted for further evaluation. She was seen by spine, options discussed as were discussed as outpt as her spine stenosis has been a chronic issue and she again declined surgery.   She was seen by neurosx who felt it was unlikely she had NPH.  Per neuro she doesn't want further w/u.    : pt seen and examined earlier today. She c/o foul smelling urine thought she had a uti.     ROS: all 10 systems reviewed and is as above otherwise negative.     Vital Signs Last 24 Hrs  T(C): 36.7 (16 Dec 2019 11:26), Max: 36.8 (15 Dec 2019 17:29)  T(F): 98 (16 Dec 2019 11:26), Max: 98.3 (15 Dec 2019 17:29)  HR: 63 (16 Dec 2019 11:26) (63 - 76)  BP: 148/40 (16 Dec 2019 11:26) (125/50 - 148/40)  RR: 17 (16 Dec 2019 11:26) (16 - 17)  SpO2: 98% (16 Dec 2019 11:26) (95% - 98%)    PHYSICAL EXAM:    GENERAL: Comfortable, no acute distress   HEAD:  Normocephalic, atraumatic  EYES: EOMI, PERRLA  HEENT: Moist mucous membranes  NECK: Supple, No JVD  NERVOUS SYSTEM:  Alert & Oriented X3, Motor Strength 5/5 B/L upper and lower extremities  CHEST/LUNG: Clear to auscultation bilaterally  HEART: Regular rate and rhythm  ABDOMEN: Soft, Nontender, Nondistended, Bowel sounds present  GENITOURINARY: Voiding, no palpable bladder  EXTREMITIES:   No clubbing, cyanosis, or edema  MUSCULOSKELTAL- No muscle tenderness, no joint tenderness  SKIN-no rash    LABS:                        9.7    13.23 )-----------( 291      ( 16 Dec 2019 07:24 )             29.8     12-16    141  |  111<H>  |  30<H>  ----------------------------<  97  4.8   |  24  |  1.10    Ca    8.9      16 Dec 2019 07:24  Mg     2.3     12-15    TPro  6.5  /  Alb  3.3  /  TBili  0.3  /  DBili  x   /  AST  12<L>  /  ALT  23  /  AlkPhos  90  12-15    PT/INR - ( 15 Dec 2019 05:22 )   PT: 10.5 sec;   INR: 0.95 ratio         PTT - ( 15 Dec 2019 05:22 )  PTT:29.5 sec  Urinalysis Basic - ( 16 Dec 2019 12:42 )    Color: Yellow / Appearance: Clear / S.005 / pH: x  Gluc: x / Ketone: Negative  / Bili: Negative / Urobili: Negative mg/dL   Blood: x / Protein: Negative mg/dL / Nitrite: Positive   Leuk Esterase: Moderate / RBC: 3-5 /HPF / WBC >50   Sq Epi: x / Non Sq Epi: Moderate / Bacteria: TNTC    CARDIAC MARKERS ( 15 Dec 2019 05:22 )  0.020 ng/mL / x     / 65 U/L / x     / x        CT Chest,abd,pelv.:   Subacute appearing fractures of the left anterior fourth, fifth, and   sixth ribs. Multiple chronic appearing right-sided rib fractures.  Indeterminate age fracture of the right pubic ramus. Chronic bilateral   sacral alar fractures and chronic fracture of the left pubic ramus.  Indeterminate age mild compression deformities of the T3, T4, T5, and T6   vertebral bodies, most severe at T6.Chronic compression deformity of L3 with bony retropulsion resulting in moderate to severe segmental canal stenosis.Cluster of mildly enlarged gastrohepatic lymph nodes of indeterminate etiology.    CTH: Ventriculomegaly slightly out of proportion with degree of sulcal   prominence. Correlate clinically for presence of normal pressure   hydrocephalus. (15 Dec 2019 12:13)    MEDICATIONS  (STANDING):  atorvastatin 40 milliGRAM(s) Oral at bedtime  cefTRIAXone Injectable. 1 milliGRAM(s) IV Push every 24 hours  famotidine    Tablet 20 milliGRAM(s) Oral daily  gabapentin 100 milliGRAM(s) Oral at bedtime  heparin  Injectable 5000 Unit(s) SubCutaneous every 12 hours  metoprolol tartrate 25 milliGRAM(s) Oral two times a day  sertraline 50 milliGRAM(s) Oral daily    MEDICATIONS  (PRN):  acetaminophen   Tablet .. 650 milliGRAM(s) Oral every 6 hours PRN Temp greater or equal to 38C (100.4F), Mild Pain (1 - 3)  ALPRAZolam 0.5 milliGRAM(s) Oral two times a day PRN anxiety  morphine  - Injectable 4 milliGRAM(s) IV Push every 4 hours PRN Severe Pain (7 - 10)  ondansetron Injectable 4 milliGRAM(s) IV Push every 6 hours PRN Nausea and/or Vomiting  traMADol 50 milliGRAM(s) Oral every 6 hours PRN Severe Pain (7 - 10)    ASSESSMENT AND PLAN:  79f, PMH AS ABOVE A/W:    1. FALL/GAIT INSTABILITY/SPINAL STENOSIS:  -seen by spine, does not want surgical intervention  -physical therapy  -pain control    2. UTI:  -start rocephin    3. HTN:  -continue bb    4. Enlarged ventricle:  -clinically does not appear to have NPH per neurosx.    5. HLD:  -statin    6. dvt px

## 2019-12-17 RX ADMIN — Medication 25 MILLIGRAM(S): at 17:08

## 2019-12-17 RX ADMIN — HEPARIN SODIUM 5000 UNIT(S): 5000 INJECTION INTRAVENOUS; SUBCUTANEOUS at 06:25

## 2019-12-17 RX ADMIN — CEFTRIAXONE 1000 MILLIGRAM(S): 500 INJECTION, POWDER, FOR SOLUTION INTRAMUSCULAR; INTRAVENOUS at 17:08

## 2019-12-17 RX ADMIN — Medication 0.5 MILLIGRAM(S): at 21:13

## 2019-12-17 RX ADMIN — HEPARIN SODIUM 5000 UNIT(S): 5000 INJECTION INTRAVENOUS; SUBCUTANEOUS at 17:08

## 2019-12-17 RX ADMIN — FAMOTIDINE 20 MILLIGRAM(S): 10 INJECTION INTRAVENOUS at 11:15

## 2019-12-17 RX ADMIN — GABAPENTIN 100 MILLIGRAM(S): 400 CAPSULE ORAL at 21:12

## 2019-12-17 RX ADMIN — ATORVASTATIN CALCIUM 40 MILLIGRAM(S): 80 TABLET, FILM COATED ORAL at 21:12

## 2019-12-17 RX ADMIN — SERTRALINE 50 MILLIGRAM(S): 25 TABLET, FILM COATED ORAL at 11:15

## 2019-12-17 RX ADMIN — Medication 25 MILLIGRAM(S): at 06:25

## 2019-12-17 NOTE — ADVANCED PRACTICE NURSE CONSULT - ASSESSMENT
This is an 79 year old female that was admitted to the hospital on 12/15/2019 for ataxia.  PMH-  LS stenosis, gait instability, multiple falls who fell at home 2 weeks ago, called police to get her up but refused to go to the Hospital. She fell again  last night while on the commode. Daughter called EMS. She sustained a skin tear on  L upper arm and LLE. She was evaluated by Trauma surgeon in the ED , no surgical intervention needed. CTH shows ventriculomegaly slightly out of proportion with degree of sulcal prominence. Correlate clinically for presence of normal pressure.    Requested to assess patient's skin tears that she sustained from home. Private aide present at the bedside.     Old dressing to LUE removed. Category 2 skin tear noted. Area to skin tear with ecchymosis. Wound clean and pink. Extremity warm to touch. Cleansed with normal saline. Xeroform dressing applied to wound bed to promote moist wound healing and covered with nonadhesive telfa and wrapped with ela.     Left shin with skin tear noted after dressing removed. Skin tear well approximated and wound dry. No drainage or odor. Periwound intact. Skin tear remains open to air at this time.     Patient remains resting in bed finishing her lunch.

## 2019-12-17 NOTE — PROGRESS NOTE ADULT - SUBJECTIVE AND OBJECTIVE BOX
c/c: fall    HPI: The patient is an 78 yo female with Hx. LS stenosis, gait instability, multiple falls, HTN, HLD,  who fell at home 2 weeks ago, called police to get her up but refused to go to the Hospital. She fell again the night prior while on the commode. Daughter called EMS. She sustained a skin tear on  L upper arm and LLE. She was evaluated by Trauma surgeon in the ED , no surgical intervention needed. CTH shows ventriculomegaly slightly out of proportion with degree of sulcal prominence. Correlate clinically for presence of normal pressure  hydrocephalus. She was admitted for further evaluation. She was seen by spine, options discussed as were discussed as outpt as her spine stenosis has been a chronic issue and she again declined surgery.   She was seen by neurosx who felt it was unlikely she had NPH.  Per neuro she doesn't want further w/u.  Found to have uti, started on iv rocephin.     : pt seen and examined this am. Felt better than yesterday. Urine less foul smelling, frequency decreased.     ROS: all 10 systems reviewed and is as above otherwise negative.     Vital Signs Last 24 Hrs  T(C): 36.7 (17 Dec 2019 05:26), Max: 36.8 (16 Dec 2019 21:15)  T(F): 98 (17 Dec 2019 05:26), Max: 98.2 (16 Dec 2019 21:15)  HR: 66 (17 Dec 2019 06:25) (66 - 72)  BP: 144/60 (17 Dec 2019 06:25) (120/29 - 152/57)  RR: 16 (17 Dec 2019 06:25) (16 - 16)  SpO2: 100% (17 Dec 2019 06:25) (97% - 100%)    PHYSICAL EXAM:    GENERAL: Comfortable, no acute distress   HEAD:  Normocephalic, atraumatic  EYES: EOMI, PERRLA  HEENT: Moist mucous membranes  NECK: Supple, No JVD  NERVOUS SYSTEM:  Alert & Oriented X3, non focal  CHEST/LUNG: Clear to auscultation bilaterally  HEART: Regular rate and rhythm  ABDOMEN: Soft, Nontender, Nondistended, Bowel sounds present  GENITOURINARY: Voiding, no palpable bladder  EXTREMITIES:   No clubbing, cyanosis, or edema  MUSCULOSKELETAL- No muscle tenderness, no joint tenderness  SKIN-no rash    LABS:                        9.7    13.23 )-----------( 291      ( 16 Dec 2019 07:24 )             29.8     12-16    141  |  111<H>  |  30<H>  ----------------------------<  97  4.8   |  24  |  1.10    Ca    8.9      16 Dec 2019 07:24        Urinalysis Basic - ( 16 Dec 2019 12:42 )    Color: Yellow / Appearance: Clear / S.005 / pH: x  Gluc: x / Ketone: Negative  / Bili: Negative / Urobili: Negative mg/dL   Blood: x / Protein: Negative mg/dL / Nitrite: Positive   Leuk Esterase: Moderate / RBC: 3-5 /HPF / WBC >50   Sq Epi: x / Non Sq Epi: Moderate / Bacteria: TNTC          CT Chest,abd,pelv.:   Subacute appearing fractures of the left anterior fourth, fifth, and   sixth ribs. Multiple chronic appearing right-sided rib fractures.  Indeterminate age fracture of the right pubic ramus. Chronic bilateral   sacral alar fractures and chronic fracture of the left pubic ramus.  Indeterminate age mild compression deformities of the T3, T4, T5, and T6   vertebral bodies, most severe at T6.Chronic compression deformity of L3 with bony retropulsion resulting in moderate to severe segmental canal stenosis.Cluster of mildly enlarged gastrohepatic lymph nodes of indeterminate etiology.    CTH: Ventriculomegaly slightly out of proportion with degree of sulcal   prominence. Correlate clinically for presence of normal pressure   hydrocephalus. (15 Dec 2019 12:13)    MEDICATIONS  (STANDING):  atorvastatin 40 milliGRAM(s) Oral at bedtime  cefTRIAXone Injectable. 1 milliGRAM(s) IV Push every 24 hours  famotidine    Tablet 20 milliGRAM(s) Oral daily  gabapentin 100 milliGRAM(s) Oral at bedtime  heparin  Injectable 5000 Unit(s) SubCutaneous every 12 hours  metoprolol tartrate 25 milliGRAM(s) Oral two times a day  sertraline 50 milliGRAM(s) Oral daily    MEDICATIONS  (STANDING):  atorvastatin 40 milliGRAM(s) Oral at bedtime  cefTRIAXone Injectable. 1000 milliGRAM(s) IV Push every 24 hours  famotidine    Tablet 20 milliGRAM(s) Oral daily  gabapentin 100 milliGRAM(s) Oral at bedtime  heparin  Injectable 5000 Unit(s) SubCutaneous every 12 hours  metoprolol tartrate 25 milliGRAM(s) Oral two times a day  sertraline 50 milliGRAM(s) Oral daily    MEDICATIONS  (PRN):  acetaminophen   Tablet .. 650 milliGRAM(s) Oral every 6 hours PRN Temp greater or equal to 38C (100.4F), Mild Pain (1 - 3)  ALPRAZolam 0.5 milliGRAM(s) Oral two times a day PRN anxiety  morphine  - Injectable 4 milliGRAM(s) IV Push every 4 hours PRN Severe Pain (7 - 10)  ondansetron Injectable 4 milliGRAM(s) IV Push every 6 hours PRN Nausea and/or Vomiting  traMADol 50 milliGRAM(s) Oral every 6 hours PRN Severe Pain (7 - 10)      ASSESSMENT AND PLAN:  79f, PMH AS ABOVE A/W:    1. FALL/GAIT INSTABILITY/SPINAL STENOSIS:  -seen by spine, does not want surgical intervention  -physical therapy  -pain control    2. UTI:  -rocephin D#2  -f/u culture    3. HTN:  -continue bb    4. Enlarged ventricle:  -outpt f/u with neurology  -seen by neurosx and neurology while here    5. HLD:  -statin    6. dvt px     7. dispo  [facility] tomorrow

## 2019-12-18 ENCOUNTER — TRANSCRIPTION ENCOUNTER (OUTPATIENT)
Age: 79
End: 2019-12-18

## 2019-12-18 VITALS
RESPIRATION RATE: 17 BRPM | TEMPERATURE: 98 F | HEART RATE: 65 BPM | DIASTOLIC BLOOD PRESSURE: 56 MMHG | OXYGEN SATURATION: 99 % | SYSTOLIC BLOOD PRESSURE: 142 MMHG

## 2019-12-18 RX ORDER — GABAPENTIN 400 MG/1
1 CAPSULE ORAL
Qty: 0 | Refills: 0 | DISCHARGE

## 2019-12-18 RX ORDER — ALPRAZOLAM 0.25 MG
1 TABLET ORAL
Qty: 0 | Refills: 0 | DISCHARGE
Start: 2019-12-18

## 2019-12-18 RX ORDER — ALPRAZOLAM 0.25 MG
0 TABLET ORAL
Qty: 0 | Refills: 0 | DISCHARGE

## 2019-12-18 RX ORDER — ATORVASTATIN CALCIUM 80 MG/1
1 TABLET, FILM COATED ORAL
Qty: 0 | Refills: 0 | DISCHARGE
Start: 2019-12-18

## 2019-12-18 RX ORDER — TRAMADOL HYDROCHLORIDE 50 MG/1
1 TABLET ORAL
Qty: 0 | Refills: 0 | DISCHARGE
Start: 2019-12-18

## 2019-12-18 RX ORDER — ACETAMINOPHEN 500 MG
2 TABLET ORAL
Qty: 0 | Refills: 0 | DISCHARGE
Start: 2019-12-18

## 2019-12-18 RX ORDER — GABAPENTIN 400 MG/1
1 CAPSULE ORAL
Qty: 0 | Refills: 0 | DISCHARGE
Start: 2019-12-18

## 2019-12-18 RX ORDER — ATORVASTATIN CALCIUM 80 MG/1
1 TABLET, FILM COATED ORAL
Qty: 0 | Refills: 0 | DISCHARGE

## 2019-12-18 RX ADMIN — Medication 650 MILLIGRAM(S): at 09:30

## 2019-12-18 RX ADMIN — FAMOTIDINE 20 MILLIGRAM(S): 10 INJECTION INTRAVENOUS at 10:42

## 2019-12-18 RX ADMIN — Medication 650 MILLIGRAM(S): at 10:00

## 2019-12-18 RX ADMIN — SERTRALINE 50 MILLIGRAM(S): 25 TABLET, FILM COATED ORAL at 10:43

## 2019-12-18 RX ADMIN — Medication 0.5 MILLIGRAM(S): at 13:05

## 2019-12-18 RX ADMIN — Medication 25 MILLIGRAM(S): at 05:53

## 2019-12-18 RX ADMIN — HEPARIN SODIUM 5000 UNIT(S): 5000 INJECTION INTRAVENOUS; SUBCUTANEOUS at 05:53

## 2019-12-18 NOTE — DISCHARGE NOTE NURSING/CASE MANAGEMENT/SOCIAL WORK - PATIENT PORTAL LINK FT
You can access the FollowMyHealth Patient Portal offered by [facility] by registering at the following website: [email or web address]. By joining [employer]’s FollowMyHealth portal, you will also be able to view your health information using other applications (apps) compatible with our system.

## 2019-12-18 NOTE — DISCHARGE NOTE PROVIDER - NSDCMRMEDTOKEN_GEN_ALL_CORE_FT
acetaminophen 325 mg oral tablet: 2 tab(s) orally every 6 hours, As needed, Temp greater or equal to 38C (100.4F), Mild Pain (1 - 3)  ALPRAZolam 0.5 mg oral tablet: 1 tab(s) orally 2 times a day, As needed, anxiety  atorvastatin 40 mg oral tablet: 1 tab(s) orally once a day (at bedtime)  Ceftin 250 mg oral tablet: 1 tab(s) orally every 12 hours. stop after 12/20 doses  gabapentin 100 mg oral capsule: 1 cap(s) orally once a day (at bedtime)  metoprolol tartrate 25 mg oral tablet: 1 tab(s) orally 2 times a day  traMADol 50 mg oral tablet: 1 tab(s) orally every 6 hours, As needed, Severe Pain (7 - 10)  Zantac 150 oral tablet: 1 tab(s) orally once a day  Zoloft 50 mg oral tablet: 1 tab(s) orally once a day (at bedtime)

## 2019-12-18 NOTE — DISCHARGE NOTE PROVIDER - NSDCCPCAREPLAN_GEN_ALL_CORE_FT
PRINCIPAL DISCHARGE DIAGNOSIS  Diagnosis: Ataxia  Assessment and Plan of Treatment: improvement  physical therapy at rehab.      SECONDARY DISCHARGE DIAGNOSES  Diagnosis: Compression fracture of thoracic vertebra, closed, initial encounter  Assessment and Plan of Treatment:     Diagnosis: Closed fracture of multiple ribs of both sides, initial encounter  Assessment and Plan of Treatment:

## 2019-12-18 NOTE — DISCHARGE NOTE PROVIDER - HOSPITAL COURSE
80 yo female with Hx. LS stenosis, gait instability, multiple falls, HTN, HLD,  who fell at home 2 weeks ago, called police to get her up but refused to go to the Hospital. She fell again the night prior while on the commode. Daughter called EMS. She sustained a skin tear on  L upper arm and LLE. She was evaluated by Trauma surgeon in the ED , no surgical intervention needed. CTH shows ventriculomegaly slightly out of proportion with degree of sulcal prominence. Correlate clinically for presence of normal pressure  hydrocephalus. She was admitted for further evaluation. She was seen by spine, options discussed as were discussed as outpt as her spine stenosis has been a chronic issue and she again declined surgery.     She was seen by neurosx who felt it was unlikely she had NPH.    Per neuro she doesn't want further w/u.    Found to have uti, started on iv rocephin. She will be discharged to SNF today.         Vital Signs Last 24 Hrs    T(C): 36.6 (18 Dec 2019 10:57), Max: 37 (17 Dec 2019 21:19)    T(F): 97.9 (18 Dec 2019 10:57), Max: 98.6 (17 Dec 2019 21:19)    HR: 65 (18 Dec 2019 10:57) (65 - 72)    BP: 142/56 (18 Dec 2019 10:57) (139/49 - 156/69)    BP(mean): 79 (18 Dec 2019 10:57) (79 - 79)    RR: 17 (18 Dec 2019 10:57) (16 - 17)    SpO2: 99% (18 Dec 2019 10:57) (95% - 100%)        PHYSICAL EXAM:        GENERAL: Comfortable, no acute distress     HEAD:  Normocephalic, atraumatic    EYES: EOMI, PERRLA    HEENT: Moist mucous membranes    NECK: Supple, No JVD    NERVOUS SYSTEM:  Alert & Oriented X3, non focal    CHEST/LUNG: Clear to auscultation bilaterally    HEART: Regular rate and rhythm    ABDOMEN: Soft, Nontender, Nondistended, Bowel sounds present    GENITOURINARY: Voiding, no palpable bladder    EXTREMITIES:   No clubbing, cyanosis, or edema    MUSCULOSKELTAL- No muscle tenderness, no joint tenderness    SKIN-no rash        LABS:                        Urinalysis Basic - ( 16 Dec 2019 12:42 )        Color: Yellow / Appearance: Clear / S.005 / pH: x    Gluc: x / Ketone: Negative  / Bili: Negative / Urobili: Negative mg/dL     Blood: x / Protein: Negative mg/dL / Nitrite: Positive     Leuk Esterase: Moderate / RBC: 3-5 /HPF / WBC >50     Sq Epi: x / Non Sq Epi: Moderate / Bacteria: TNTC        MR Lumbar Spine No Cont (12.15.19 @ 13:47) >    IMPRESSION:     Chronic compression fracture of T6 vertebral body resulting in loss of     50% vertebral body height anteriorly with minimal posterior retropulsion.    Mild compression deformities of T2-T4 superior endplates, which may     represent additional chronic compression fractures.        Chronic compression fracture of L2 vertebral body resulting in loss of     50% vertebral body height anteriorly and posterior retropulsion, findings     similar in appearance compared to prior lumbar spine MRI in 2018. There     is associated severe canal stenosis at this level resulting in     compression/impingement upon the cauda equina nerve roots, with     suggestion of sequestered disc fragment.            FINAL DIAGNOSIS    1. FALL/GAIT INSTABILITY/SPINAL STENOSIS:    2. UTI    3. HTN    4. Enlarged ventricle:    -outpt f/u with neurology    5. HLD        time taken for dc 48 min    plan d/w pt in detail.     summary to be faxed to pcp

## 2019-12-18 NOTE — DISCHARGE NOTE PROVIDER - CARE PROVIDER_API CALL
Radha Cooper)  Orthopaedic Surgery  [address] [place] [address], [address] [place] [address]  Phone: [phone]  Fax: [phone]  Follow Up Time:

## 2019-12-31 NOTE — CDI QUERY NOTE - NSCDIOTHERTXTBX_GEN_ALL_CORE_HH
Clinical documentation indicates that this patient has _____history of  osteoporosis and p/w fractures of ribs, lumbar and thoracic vertebra_______.  Please include more specific documentation, either known or suspected, of type and underlying cause of this condition in your Progress Note and/or Discharge Summary.  A. pathological fractures present on admission  B. traumatic fractures present on admission  C. fractures due to other (specify)  D. not clinically significant    Present on Admission:  Was the severity of the condition present on admission?  If so, please document in the chart that “(the condition) was present on admission.” In responding to this request, please exercise your independent professional judgment.  The fact that a question is asked does not imply that any particular answer is desired or expected.  Documentation clarification is required for compliance, accuracy in coding and billing, and reporting severity of illness, quality data   and risk of mortality.

## 2020-01-31 ENCOUNTER — APPOINTMENT (OUTPATIENT)
Dept: OBGYN | Facility: CLINIC | Age: 80
End: 2020-01-31

## 2020-03-25 ENCOUNTER — APPOINTMENT (OUTPATIENT)
Dept: RHEUMATOLOGY | Facility: CLINIC | Age: 80
End: 2020-03-25

## 2020-07-20 RX ORDER — VALACYCLOVIR 500 MG/1
1 TABLET, FILM COATED ORAL
Qty: 0 | Refills: 0 | DISCHARGE
Start: 2020-07-20 | End: 2020-07-26

## 2020-07-20 RX ORDER — CARBAMIDE PEROXIDE 81.86 MG/ML
5 SOLUTION/ DROPS AURICULAR (OTIC)
Qty: 0 | Refills: 0 | DISCHARGE
Start: 2020-07-20 | End: 2020-08-02

## 2020-07-21 ENCOUNTER — EMERGENCY (EMERGENCY)
Facility: HOSPITAL | Age: 80
LOS: 0 days | Discharge: HOME | End: 2020-07-21
Attending: EMERGENCY MEDICINE
Payer: MEDICARE

## 2020-07-21 VITALS
RESPIRATION RATE: 18 BRPM | WEIGHT: 160.06 LBS | HEIGHT: 68 IN | SYSTOLIC BLOOD PRESSURE: 122 MMHG | TEMPERATURE: 98 F | DIASTOLIC BLOOD PRESSURE: 90 MMHG | HEART RATE: 64 BPM | OXYGEN SATURATION: 96 %

## 2020-07-21 VITALS
TEMPERATURE: 98 F | HEART RATE: 95 BPM | SYSTOLIC BLOOD PRESSURE: 157 MMHG | DIASTOLIC BLOOD PRESSURE: 62 MMHG | OXYGEN SATURATION: 96 %

## 2020-07-21 DIAGNOSIS — F32.9 MAJOR DEPRESSIVE DISORDER, SINGLE EPISODE, UNSPECIFIED: ICD-10-CM

## 2020-07-21 DIAGNOSIS — Z98.890 OTHER SPECIFIED POSTPROCEDURAL STATES: Chronic | ICD-10-CM

## 2020-07-21 DIAGNOSIS — M19.90 UNSPECIFIED OSTEOARTHRITIS, UNSPECIFIED SITE: ICD-10-CM

## 2020-07-21 DIAGNOSIS — E78.5 HYPERLIPIDEMIA, UNSPECIFIED: ICD-10-CM

## 2020-07-21 DIAGNOSIS — I10 ESSENTIAL (PRIMARY) HYPERTENSION: ICD-10-CM

## 2020-07-21 DIAGNOSIS — M48.061 SPINAL STENOSIS, LUMBAR REGION WITHOUT NEUROGENIC CLAUDICATION: ICD-10-CM

## 2020-07-21 DIAGNOSIS — M54.9 DORSALGIA, UNSPECIFIED: ICD-10-CM

## 2020-07-21 DIAGNOSIS — R45.851 SUICIDAL IDEATIONS: ICD-10-CM

## 2020-07-21 DIAGNOSIS — N39.0 URINARY TRACT INFECTION, SITE NOT SPECIFIED: ICD-10-CM

## 2020-07-21 DIAGNOSIS — Z92.241 PERSONAL HISTORY OF SYSTEMIC STEROID THERAPY: Chronic | ICD-10-CM

## 2020-07-21 DIAGNOSIS — G89.29 OTHER CHRONIC PAIN: ICD-10-CM

## 2020-07-21 LAB
ALBUMIN SERPL ELPH-MCNC: 4.5 G/DL — SIGNIFICANT CHANGE UP (ref 3.3–5)
ALP SERPL-CCNC: 83 U/L — SIGNIFICANT CHANGE UP (ref 40–120)
ALT FLD-CCNC: 28 U/L — SIGNIFICANT CHANGE UP (ref 12–78)
ANION GAP SERPL CALC-SCNC: 9 MMOL/L — SIGNIFICANT CHANGE UP (ref 5–17)
APAP SERPL-MCNC: <2 UG/ML — LOW (ref 10–30)
APPEARANCE UR: CLEAR — SIGNIFICANT CHANGE UP
AST SERPL-CCNC: 20 U/L — SIGNIFICANT CHANGE UP (ref 15–37)
BASOPHILS # BLD AUTO: 0.08 K/UL — SIGNIFICANT CHANGE UP (ref 0–0.2)
BASOPHILS NFR BLD AUTO: 0.6 % — SIGNIFICANT CHANGE UP (ref 0–2)
BILIRUB SERPL-MCNC: 0.5 MG/DL — SIGNIFICANT CHANGE UP (ref 0.2–1.2)
BILIRUB UR-MCNC: NEGATIVE — SIGNIFICANT CHANGE UP
BUN SERPL-MCNC: 32 MG/DL — HIGH (ref 7–23)
CALCIUM SERPL-MCNC: 10.1 MG/DL — SIGNIFICANT CHANGE UP (ref 8.5–10.1)
CHLORIDE SERPL-SCNC: 101 MMOL/L — SIGNIFICANT CHANGE UP (ref 96–108)
CO2 SERPL-SCNC: 25 MMOL/L — SIGNIFICANT CHANGE UP (ref 22–31)
COLOR SPEC: YELLOW — SIGNIFICANT CHANGE UP
CREAT SERPL-MCNC: 1.32 MG/DL — HIGH (ref 0.5–1.3)
DIFF PNL FLD: ABNORMAL
EOSINOPHIL # BLD AUTO: 1.02 K/UL — HIGH (ref 0–0.5)
EOSINOPHIL NFR BLD AUTO: 7.6 % — HIGH (ref 0–6)
ETHANOL SERPL-MCNC: <10 MG/DL — SIGNIFICANT CHANGE UP (ref 0–10)
GLUCOSE SERPL-MCNC: 90 MG/DL — SIGNIFICANT CHANGE UP (ref 70–99)
GLUCOSE UR QL: NEGATIVE MG/DL — SIGNIFICANT CHANGE UP
HCT VFR BLD CALC: 32.4 % — LOW (ref 34.5–45)
HGB BLD-MCNC: 11 G/DL — LOW (ref 11.5–15.5)
IMM GRANULOCYTES NFR BLD AUTO: 0.4 % — SIGNIFICANT CHANGE UP (ref 0–1.5)
KETONES UR-MCNC: ABNORMAL
LEUKOCYTE ESTERASE UR-ACNC: ABNORMAL
LYMPHOCYTES # BLD AUTO: 16.8 % — SIGNIFICANT CHANGE UP (ref 13–44)
LYMPHOCYTES # BLD AUTO: 2.26 K/UL — SIGNIFICANT CHANGE UP (ref 1–3.3)
MCHC RBC-ENTMCNC: 31.8 PG — SIGNIFICANT CHANGE UP (ref 27–34)
MCHC RBC-ENTMCNC: 34 GM/DL — SIGNIFICANT CHANGE UP (ref 32–36)
MCV RBC AUTO: 93.6 FL — SIGNIFICANT CHANGE UP (ref 80–100)
MONOCYTES # BLD AUTO: 1.17 K/UL — HIGH (ref 0–0.9)
MONOCYTES NFR BLD AUTO: 8.7 % — SIGNIFICANT CHANGE UP (ref 2–14)
NEUTROPHILS # BLD AUTO: 8.85 K/UL — HIGH (ref 1.8–7.4)
NEUTROPHILS NFR BLD AUTO: 65.9 % — SIGNIFICANT CHANGE UP (ref 43–77)
NITRITE UR-MCNC: NEGATIVE — SIGNIFICANT CHANGE UP
PCP SPEC-MCNC: SIGNIFICANT CHANGE UP
PH UR: 6.5 — SIGNIFICANT CHANGE UP (ref 5–8)
PLATELET # BLD AUTO: 286 K/UL — SIGNIFICANT CHANGE UP (ref 150–400)
POTASSIUM SERPL-MCNC: 3.7 MMOL/L — SIGNIFICANT CHANGE UP (ref 3.5–5.3)
POTASSIUM SERPL-SCNC: 3.7 MMOL/L — SIGNIFICANT CHANGE UP (ref 3.5–5.3)
PROT SERPL-MCNC: 8.1 GM/DL — SIGNIFICANT CHANGE UP (ref 6–8.3)
PROT UR-MCNC: 30 MG/DL
RBC # BLD: 3.46 M/UL — LOW (ref 3.8–5.2)
RBC # FLD: 12.2 % — SIGNIFICANT CHANGE UP (ref 10.3–14.5)
SALICYLATES SERPL-MCNC: <1.7 MG/DL — LOW (ref 2.8–20)
SARS-COV-2 RNA SPEC QL NAA+PROBE: SIGNIFICANT CHANGE UP
SODIUM SERPL-SCNC: 135 MMOL/L — SIGNIFICANT CHANGE UP (ref 135–145)
SP GR SPEC: 1 — LOW (ref 1.01–1.02)
UROBILINOGEN FLD QL: NEGATIVE MG/DL — SIGNIFICANT CHANGE UP
WBC # BLD: 13.44 K/UL — HIGH (ref 3.8–10.5)
WBC # FLD AUTO: 13.44 K/UL — HIGH (ref 3.8–10.5)

## 2020-07-21 PROCEDURE — 93005 ELECTROCARDIOGRAM TRACING: CPT

## 2020-07-21 PROCEDURE — 90792 PSYCH DIAG EVAL W/MED SRVCS: CPT | Mod: 95

## 2020-07-21 PROCEDURE — 99285 EMERGENCY DEPT VISIT HI MDM: CPT | Mod: 25

## 2020-07-21 PROCEDURE — 87635 SARS-COV-2 COVID-19 AMP PRB: CPT

## 2020-07-21 PROCEDURE — 93010 ELECTROCARDIOGRAM REPORT: CPT

## 2020-07-21 PROCEDURE — 81001 URINALYSIS AUTO W/SCOPE: CPT

## 2020-07-21 PROCEDURE — 80307 DRUG TEST PRSMV CHEM ANLYZR: CPT

## 2020-07-21 PROCEDURE — 96374 THER/PROPH/DIAG INJ IV PUSH: CPT

## 2020-07-21 PROCEDURE — 80053 COMPREHEN METABOLIC PANEL: CPT

## 2020-07-21 PROCEDURE — 85025 COMPLETE CBC W/AUTO DIFF WBC: CPT

## 2020-07-21 PROCEDURE — 36415 COLL VENOUS BLD VENIPUNCTURE: CPT

## 2020-07-21 PROCEDURE — 99285 EMERGENCY DEPT VISIT HI MDM: CPT

## 2020-07-21 RX ORDER — METHENAMINE MANDELATE 1 G
1 TABLET ORAL
Qty: 0 | Refills: 0 | DISCHARGE
Start: 2020-07-21 | End: 2020-07-30

## 2020-07-21 RX ORDER — NITROFURANTOIN MACROCRYSTAL 50 MG
1 CAPSULE ORAL
Qty: 20 | Refills: 0
Start: 2020-07-21 | End: 2020-07-30

## 2020-07-21 RX ORDER — NITROFURANTOIN MACROCRYSTAL 50 MG
100 CAPSULE ORAL ONCE
Refills: 0 | Status: COMPLETED | OUTPATIENT
Start: 2020-07-21 | End: 2020-07-21

## 2020-07-21 RX ORDER — CARBAMIDE PEROXIDE 81.86 MG/ML
5 SOLUTION/ DROPS AURICULAR (OTIC)
Qty: 0 | Refills: 0 | DISCHARGE
Start: 2020-07-21 | End: 2020-08-03

## 2020-07-21 RX ORDER — VALACYCLOVIR 500 MG/1
1 TABLET, FILM COATED ORAL
Qty: 0 | Refills: 0 | DISCHARGE
Start: 2020-07-21 | End: 2020-07-27

## 2020-07-21 RX ORDER — LIDOCAINE 4 G/100G
1 CREAM TOPICAL ONCE
Refills: 0 | Status: COMPLETED | OUTPATIENT
Start: 2020-07-21 | End: 2020-07-21

## 2020-07-21 RX ORDER — ALBUTEROL 90 UG/1
2 AEROSOL, METERED ORAL
Qty: 18 | Refills: 0
Start: 2020-07-21 | End: 2020-07-30

## 2020-07-21 RX ORDER — NITROFURANTOIN MACROCRYSTAL 50 MG
1 CAPSULE ORAL
Qty: 0 | Refills: 0 | DISCHARGE
Start: 2020-07-21 | End: 2020-07-30

## 2020-07-21 RX ORDER — ALBUTEROL 90 UG/1
2 AEROSOL, METERED ORAL ONCE
Refills: 0 | Status: DISCONTINUED | OUTPATIENT
Start: 2020-07-21 | End: 2020-07-21

## 2020-07-21 RX ORDER — OXYCODONE AND ACETAMINOPHEN 5; 325 MG/1; MG/1
1 TABLET ORAL ONCE
Refills: 0 | Status: DISCONTINUED | OUTPATIENT
Start: 2020-07-21 | End: 2020-07-21

## 2020-07-21 RX ORDER — KETOROLAC TROMETHAMINE 30 MG/ML
15 SYRINGE (ML) INJECTION ONCE
Refills: 0 | Status: DISCONTINUED | OUTPATIENT
Start: 2020-07-21 | End: 2020-07-21

## 2020-07-21 RX ADMIN — Medication 100 MILLIGRAM(S): at 04:22

## 2020-07-21 RX ADMIN — Medication 15 MILLIGRAM(S): at 02:12

## 2020-07-21 RX ADMIN — OXYCODONE AND ACETAMINOPHEN 1 TABLET(S): 5; 325 TABLET ORAL at 04:22

## 2020-07-21 NOTE — ED PROVIDER NOTE - OBJECTIVE STATEMENT
78 y/o female in ED c/o depression and chronic lower back pain sent by [facility] after making suicidal statements.   pt states that she does not want to be at the [facility] and that her children placed her there.   pt states that the facility just wanted to give her narcotics for her back pain that she did not want.   pt denies any SI or HI.   states that she does not want to go back.   pt denies any fever, HA, cp, sob, n/v/d/abd pain

## 2020-07-21 NOTE — ED BEHAVIORAL HEALTH ASSESSMENT NOTE - HPI (INCLUDE ILLNESS QUALITY, SEVERITY, DURATION, TIMING, CONTEXT, MODIFYING FACTORS, ASSOCIATED SIGNS AND SYMPTOMS)
Patient is an 80yo  female, , domiciled at [facility], with past psych hx of depression, no past psych hospitalizations, Patient is an 78yo  female, , domiciled at [facility], with past psych hx of depression, no past psych hospitalizations, no known past SAs or SIB, no known hx of violence,     and PMH of HTN, chronic back pain and spinal stenosis, HLD, gall stones. She is sent in from [facility] home for SI, paranoia, and delusional behavior. Patient is an 80yo  female, , domiciled at [facility], with past psych hx of depression, no past psych hospitalizations, no known past SAs or SIB, no known hx of violence, no active substance use, and PMH of HTN, chronic back pain and spinal stenosis, HLD, gall stones. She is sent in from [facility] home for SI, paranoia, and delusional behavior.    Patient reports depressed mood and anxiety related to having to reside at [facility].

## 2020-07-21 NOTE — ED ADULT NURSE REASSESSMENT NOTE - NS ED NURSE REASSESS COMMENT FT1
Spoke with Viktor CA and states that [place] in [place] is the location of pt, pt agreeable to go to location at this time, all belongings given back at this time.

## 2020-07-21 NOTE — ED PROVIDER NOTE - PROGRESS NOTE DETAILS
case d/w telepsych and will evaluate ptBernard Ballard's number [phone] pt evaluated by telepsych and cleared from psych standpoint.    will d/c back to [facility] with script for uti.

## 2020-07-21 NOTE — ED ADULT TRIAGE NOTE - CHIEF COMPLAINT QUOTE
Patient brought in for back pain, flare up of her spinal stenosis.  The bristol states she has been refusing to take meds and making SI statements.  Patient denies SI/HI in triage.

## 2020-07-21 NOTE — ED BEHAVIORAL HEALTH ASSESSMENT NOTE - SUMMARY
Patient is an 80yo  female, , domiciled at [facility], with past psych hx of depression and anxiety, no prior psychiatric hospitalizations, with history of suicidal statement but no prior self-injurious behaviors or suicide attempt, was referred by [facility] and brought in by EMS for paranoia with agitation.     Patient presenting calm and cooperative; pleasant; linear, organized with evidence of thought disorder. Patient has no manic / psychotic symptoms. No delusions elicited. Patient has moderate depression. Patient has no suicidal ideation. Patient mood lability, agitation and ?paranoia as reported by collateral may be influenced by presence of UTI. Interpersonal dynamics with family may be at play also. Patient has therapeutic relationships and social supports. Patient is future oriented. Patient engaged in safety planning. Patient symptoms not indicating imminent risk for harm to self; not warranting involuntary in-patient hospitalization. Patient is an 78yo  female, , domiciled at [facility], with past psych hx of depression, no past psych hospitalizations, no known past SAs or SIB, no known hx of violence, no active substance use, and PMH of HTN, chronic back pain and spinal stenosis, HLD, gall stones. She is sent in from [facility] home for SI, paranoia, and delusional behavior.    Patient presents with depression and anxiety related to living situation at NH, with irritability and paranoia towards daughter worsening in the last week.  Patient symptoms not indicating imminent risk for harm to self; not warranting involuntary in-patient hospitalization.

## 2020-07-21 NOTE — ED BEHAVIORAL HEALTH NOTE - BEHAVIORAL HEALTH NOTE
===================  PRE-HOSPITAL COURSE  ===================    SOURCE: Chart     DETAILS: BIBEMS for back pain, per staff at [facility] also made SI statement      ============  ED COURSE   ============    SOURCE: ED RN, Chart    ARRIVAL: EMS    BELONGINGS: No items of note    BEHAVIOR: Patient arrived alert and oriented x3, normal grooming/hygiene, cooperative with ED medical and safety protocols. Patient denied psychiatric complaints, denied SI/HI, did not report or exhibit any psychotic/manic symptoms. Patient noted to complain about her daughter and the living facility. Patient had linear thought process and speech WNL. Patient remained in good behavioral control while in the ED.     TREATMENT: No PRN psychiatric medication prior to assessment    VISITORS: None    ========================  COLLATERAL  ========================    NAME: Irina     NUMBER: [phone]    RELATIONSHIP: Night RN at [facility]     RELIABILITY: Good    ========================  HPI  ========================    BASELINE FUNCTIONING: Patient has been living at the [facility] since January. Patient carries a MH dx of Anxiety and Depression. Patient seems to be unhappy living at the facility. Patient often complains about her daughter and issues with family. Patient’s mood fluctuates between pleasant and irritable at baseline. She is normally A&Ox3, requires some assistance from staff for ADLs.     DATE HPI STARTED: This AM    DECOMPENSATION: Per RN the past 2 days patient has been pressing her staff call button frequently for menial things or back pain, has been intermittently non-compliant with medication. RN states 2 nights ago patient seemed to be confused about her location stating she was at  and then said she was at her previous home. RN states this evening patient was complaining of back pain that was excessive so they called EMS. Per staff once EMS arrived patient no longer wanted to go, in her frustration she made a statement about killing herself, no specific actions/plan/intent were noted. No other acute changes in mood/behavior/symptoms noted by staff other than previously stated. Per RN she has not known patient to make any SI statements prior to this.     SUICIDALITY: Made statement about killing herself after EMS arrived to bring patient to the ED for pain and she no longer wanted to go    VIOLENCE: None    SUBSTANCE: None known

## 2020-07-21 NOTE — ED ADULT NURSE REASSESSMENT NOTE - NS ED NURSE REASSESS COMMENT FT1
Pt's meds counted and placed in locked bag, signed and sent down to pharmacy, all belongings taken and locked up with security, 1:1 at bedside, safety maintained, will continue to monitor.

## 2020-07-21 NOTE — ED BEHAVIORAL HEALTH ASSESSMENT NOTE - SAFETY PLAN DETAILS
No safety plan indicated at this time. Patient advised to No safety plan indicated at this time. Patient advised to return to ED if symptoms worsen or if thoughts to harm self or others occurs.

## 2020-07-21 NOTE — ED ADULT NURSE NOTE - OBJECTIVE STATEMENT
Pt presents to er with complaints of back pain from spinal stenosis, pt resides at [facility], as per EMS, pt was making suicidal comments about her life, when pt was questioned about suicidal ideation pt denied, pt denies all suicidal and homicidal ideation at this time. Pt says that she has terrible spinal stenosis and can no longer tolerate the pain and this day was terrible with her family, she is very upset about being in [facility] and doesn't want to be there. Pt alert to person, place, time, situation, complaining of 8/10 pain at this time and states she has decreased po intake,  notified, safety maintained and 1:1 ordered at this time, safety maintained, will continue to monitor.

## 2020-07-21 NOTE — ED PROVIDER NOTE - PATIENT PORTAL LINK FT
You can access the FollowMyHealth Patient Portal offered by [facility] by registering at the following website: [email or web address]. By joining [facility]’s FollowMyHealth portal, you will also be able to view your health information using other applications (apps) compatible with our system.

## 2020-07-21 NOTE — ED BEHAVIORAL HEALTH ASSESSMENT NOTE - OTHER
other residents Records Checked: [facility], [facility], [facility], [facility], [facility] In[facility], [facility] CL, [facility] Outpatient Medical, [facility] Outpatient BH, H[facility] ED, [facility] Inpatient, [facility] Outpatient, [facility] Inpatient, [facility] E&A, [facility] Inpatient, [facility] ED, Quick Docs, Healthix, Psyckes, [facility] Inpatient, [facility] CL, [facility], Social Media (For example - Facebook, Incipientam, Omnisens), Web search, Forensic Databases UTI; selling of house deferred

## 2020-07-21 NOTE — ED BEHAVIORAL HEALTH ASSESSMENT NOTE - RISK ASSESSMENT
LOW RISK     ACUTE RISK FACTORS: recent mood lability with agitation, feeling alone, medication non-compliance     CHRONIC RISK FACTORS: depression,      PROTECTIVE FACTORS: no suicidal ideation/intent/plan, future oriented, motivation for psychiatric treatment, engaged in safety planning. Low Acute Suicide Risk

## 2020-07-24 ENCOUNTER — EMERGENCY (EMERGENCY)
Facility: HOSPITAL | Age: 80
LOS: 0 days | Discharge: HOME | End: 2020-07-24
Attending: EMERGENCY MEDICINE
Payer: MEDICARE

## 2020-07-24 VITALS
WEIGHT: 160.06 LBS | HEART RATE: 96 BPM | RESPIRATION RATE: 18 BRPM | SYSTOLIC BLOOD PRESSURE: 156 MMHG | HEIGHT: 68 IN | DIASTOLIC BLOOD PRESSURE: 92 MMHG | OXYGEN SATURATION: 97 % | TEMPERATURE: 99 F

## 2020-07-24 VITALS
SYSTOLIC BLOOD PRESSURE: 160 MMHG | OXYGEN SATURATION: 97 % | RESPIRATION RATE: 16 BRPM | HEART RATE: 79 BPM | DIASTOLIC BLOOD PRESSURE: 70 MMHG | TEMPERATURE: 99 F

## 2020-07-24 DIAGNOSIS — M81.0 AGE-RELATED OSTEOPOROSIS WITHOUT CURRENT PATHOLOGICAL FRACTURE: ICD-10-CM

## 2020-07-24 DIAGNOSIS — F32.9 MAJOR DEPRESSIVE DISORDER, SINGLE EPISODE, UNSPECIFIED: ICD-10-CM

## 2020-07-24 DIAGNOSIS — Z79.899 OTHER LONG TERM (CURRENT) DRUG THERAPY: ICD-10-CM

## 2020-07-24 DIAGNOSIS — K80.20 CALCULUS OF GALLBLADDER WITHOUT CHOLECYSTITIS WITHOUT OBSTRUCTION: ICD-10-CM

## 2020-07-24 DIAGNOSIS — F22 DELUSIONAL DISORDERS: ICD-10-CM

## 2020-07-24 DIAGNOSIS — I49.1 ATRIAL PREMATURE DEPOLARIZATION: ICD-10-CM

## 2020-07-24 DIAGNOSIS — M48.061 SPINAL STENOSIS, LUMBAR REGION WITHOUT NEUROGENIC CLAUDICATION: ICD-10-CM

## 2020-07-24 DIAGNOSIS — I10 ESSENTIAL (PRIMARY) HYPERTENSION: ICD-10-CM

## 2020-07-24 DIAGNOSIS — R49.22 HYPONASALITY: ICD-10-CM

## 2020-07-24 DIAGNOSIS — R11.10 VOMITING, UNSPECIFIED: ICD-10-CM

## 2020-07-24 DIAGNOSIS — Z98.890 OTHER SPECIFIED POSTPROCEDURAL STATES: Chronic | ICD-10-CM

## 2020-07-24 DIAGNOSIS — G89.29 OTHER CHRONIC PAIN: ICD-10-CM

## 2020-07-24 DIAGNOSIS — Z59.9 PROBLEM RELATED TO HOUSING AND ECONOMIC CIRCUMSTANCES, UNSPECIFIED: ICD-10-CM

## 2020-07-24 DIAGNOSIS — M54.9 DORSALGIA, UNSPECIFIED: ICD-10-CM

## 2020-07-24 DIAGNOSIS — E78.5 HYPERLIPIDEMIA, UNSPECIFIED: ICD-10-CM

## 2020-07-24 DIAGNOSIS — Z92.241 PERSONAL HISTORY OF SYSTEMIC STEROID THERAPY: Chronic | ICD-10-CM

## 2020-07-24 LAB
ADD ON TEST-SPECIMEN IN LAB: SIGNIFICANT CHANGE UP
ALBUMIN SERPL ELPH-MCNC: 4.1 G/DL — SIGNIFICANT CHANGE UP (ref 3.3–5)
ALP SERPL-CCNC: 77 U/L — SIGNIFICANT CHANGE UP (ref 40–120)
ALT FLD-CCNC: 33 U/L — SIGNIFICANT CHANGE UP (ref 12–78)
ANION GAP SERPL CALC-SCNC: 12 MMOL/L — SIGNIFICANT CHANGE UP (ref 5–17)
ANION GAP SERPL CALC-SCNC: 9 MMOL/L — SIGNIFICANT CHANGE UP (ref 5–17)
APAP SERPL-MCNC: <2 UG/ML — LOW (ref 10–30)
AST SERPL-CCNC: 29 U/L — SIGNIFICANT CHANGE UP (ref 15–37)
BASOPHILS # BLD AUTO: 0.03 K/UL — SIGNIFICANT CHANGE UP (ref 0–0.2)
BASOPHILS NFR BLD AUTO: 0.3 % — SIGNIFICANT CHANGE UP (ref 0–2)
BILIRUB SERPL-MCNC: 0.3 MG/DL — SIGNIFICANT CHANGE UP (ref 0.2–1.2)
BUN SERPL-MCNC: 16 MG/DL — SIGNIFICANT CHANGE UP (ref 7–23)
BUN SERPL-MCNC: 21 MG/DL — SIGNIFICANT CHANGE UP (ref 7–23)
CALCIUM SERPL-MCNC: 8.6 MG/DL — SIGNIFICANT CHANGE UP (ref 8.5–10.1)
CALCIUM SERPL-MCNC: 9.4 MG/DL — SIGNIFICANT CHANGE UP (ref 8.5–10.1)
CHLORIDE SERPL-SCNC: 101 MMOL/L — SIGNIFICANT CHANGE UP (ref 96–108)
CHLORIDE SERPL-SCNC: 93 MMOL/L — LOW (ref 96–108)
CO2 SERPL-SCNC: 22 MMOL/L — SIGNIFICANT CHANGE UP (ref 22–31)
CO2 SERPL-SCNC: 24 MMOL/L — SIGNIFICANT CHANGE UP (ref 22–31)
CREAT SERPL-MCNC: 1.03 MG/DL — SIGNIFICANT CHANGE UP (ref 0.5–1.3)
CREAT SERPL-MCNC: 1.13 MG/DL — SIGNIFICANT CHANGE UP (ref 0.5–1.3)
EOSINOPHIL # BLD AUTO: 0.04 K/UL — SIGNIFICANT CHANGE UP (ref 0–0.5)
EOSINOPHIL NFR BLD AUTO: 0.4 % — SIGNIFICANT CHANGE UP (ref 0–6)
ETHANOL SERPL-MCNC: <10 MG/DL — SIGNIFICANT CHANGE UP (ref 0–10)
GLUCOSE SERPL-MCNC: 107 MG/DL — HIGH (ref 70–99)
GLUCOSE SERPL-MCNC: 123 MG/DL — HIGH (ref 70–99)
HCT VFR BLD CALC: 32.2 % — LOW (ref 34.5–45)
HGB BLD-MCNC: 11.2 G/DL — LOW (ref 11.5–15.5)
IMM GRANULOCYTES NFR BLD AUTO: 0.6 % — SIGNIFICANT CHANGE UP (ref 0–1.5)
LYMPHOCYTES # BLD AUTO: 0.91 K/UL — LOW (ref 1–3.3)
LYMPHOCYTES # BLD AUTO: 8.1 % — LOW (ref 13–44)
MCHC RBC-ENTMCNC: 31.8 PG — SIGNIFICANT CHANGE UP (ref 27–34)
MCHC RBC-ENTMCNC: 34.8 GM/DL — SIGNIFICANT CHANGE UP (ref 32–36)
MCV RBC AUTO: 91.5 FL — SIGNIFICANT CHANGE UP (ref 80–100)
MONOCYTES # BLD AUTO: 0.47 K/UL — SIGNIFICANT CHANGE UP (ref 0–0.9)
MONOCYTES NFR BLD AUTO: 4.2 % — SIGNIFICANT CHANGE UP (ref 2–14)
NEUTROPHILS # BLD AUTO: 9.72 K/UL — HIGH (ref 1.8–7.4)
NEUTROPHILS NFR BLD AUTO: 86.4 % — HIGH (ref 43–77)
PLATELET # BLD AUTO: 302 K/UL — SIGNIFICANT CHANGE UP (ref 150–400)
POTASSIUM SERPL-MCNC: 3.4 MMOL/L — LOW (ref 3.5–5.3)
POTASSIUM SERPL-MCNC: 3.6 MMOL/L — SIGNIFICANT CHANGE UP (ref 3.5–5.3)
POTASSIUM SERPL-SCNC: 3.4 MMOL/L — LOW (ref 3.5–5.3)
POTASSIUM SERPL-SCNC: 3.6 MMOL/L — SIGNIFICANT CHANGE UP (ref 3.5–5.3)
PROT SERPL-MCNC: 7.9 GM/DL — SIGNIFICANT CHANGE UP (ref 6–8.3)
RBC # BLD: 3.52 M/UL — LOW (ref 3.8–5.2)
RBC # FLD: 11.7 % — SIGNIFICANT CHANGE UP (ref 10.3–14.5)
SALICYLATES SERPL-MCNC: 9.8 MG/DL — SIGNIFICANT CHANGE UP (ref 2.8–20)
SARS-COV-2 RNA SPEC QL NAA+PROBE: SIGNIFICANT CHANGE UP
SODIUM SERPL-SCNC: 127 MMOL/L — LOW (ref 135–145)
SODIUM SERPL-SCNC: 134 MMOL/L — LOW (ref 135–145)
WBC # BLD: 11.24 K/UL — HIGH (ref 3.8–10.5)
WBC # FLD AUTO: 11.24 K/UL — HIGH (ref 3.8–10.5)

## 2020-07-24 PROCEDURE — 99285 EMERGENCY DEPT VISIT HI MDM: CPT

## 2020-07-24 PROCEDURE — 93010 ELECTROCARDIOGRAM REPORT: CPT

## 2020-07-24 RX ORDER — SODIUM CHLORIDE 9 MG/ML
1000 INJECTION INTRAMUSCULAR; INTRAVENOUS; SUBCUTANEOUS ONCE
Refills: 0 | Status: COMPLETED | OUTPATIENT
Start: 2020-07-24 | End: 2020-07-24

## 2020-07-24 RX ORDER — SERTRALINE 25 MG/1
1 TABLET, FILM COATED ORAL
Qty: 0 | Refills: 0 | DISCHARGE

## 2020-07-24 RX ORDER — MIRTAZAPINE 45 MG/1
1 TABLET, ORALLY DISINTEGRATING ORAL
Qty: 30 | Refills: 0
Start: 2020-07-24 | End: 2020-08-22

## 2020-07-24 RX ORDER — SERTRALINE 25 MG/1
3 TABLET, FILM COATED ORAL
Qty: 90 | Refills: 0
Start: 2020-07-24 | End: 2020-08-22

## 2020-07-24 RX ORDER — ONDANSETRON 8 MG/1
4 TABLET, FILM COATED ORAL ONCE
Refills: 0 | Status: COMPLETED | OUTPATIENT
Start: 2020-07-24 | End: 2020-07-24

## 2020-07-24 RX ADMIN — ONDANSETRON 4 MILLIGRAM(S): 8 TABLET, FILM COATED ORAL at 12:37

## 2020-07-24 RX ADMIN — SODIUM CHLORIDE 2000 MILLILITER(S): 9 INJECTION INTRAMUSCULAR; INTRAVENOUS; SUBCUTANEOUS at 14:56

## 2020-07-24 RX ADMIN — SODIUM CHLORIDE 2000 MILLILITER(S): 9 INJECTION INTRAMUSCULAR; INTRAVENOUS; SUBCUTANEOUS at 13:23

## 2020-07-24 RX ADMIN — Medication 1 MILLIGRAM(S): at 15:11

## 2020-07-24 SDOH — ECONOMIC STABILITY - INCOME SECURITY: PROBLEM RELATED TO HOUSING AND ECONOMIC CIRCUMSTANCES, UNSPECIFIED: Z59.9

## 2020-07-24 NOTE — ED PROVIDER NOTE - CLINICAL SUMMARY MEDICAL DECISION MAKING FREE TEXT BOX
Pt presents from [place] for possible SI and n/v. Likely [place] tear. Plan: psych clearance labs and psych consult.

## 2020-07-24 NOTE — ED PROVIDER NOTE - CARE PLAN
Principal Discharge DX:	Vomiting, intractability of vomiting not specified, presence of nausea not specified, unspecified vomiting type  Secondary Diagnosis:	Depression, unspecified depression type Principal Discharge DX:	Vomiting, intractability of vomiting not specified, presence of nausea not specified, unspecified vomiting type  Secondary Diagnosis:	Depression, unspecified depression type  Secondary Diagnosis:	Hyponatremia

## 2020-07-24 NOTE — ED PROVIDER NOTE - PATIENT PORTAL LINK FT
You can access the FollowMyHealth Patient Portal offered by [facility] by registering at the following website: [email or web address]. By joining [facility]’s FollowMyHealth portal, you will also be able to view your health information using other applications (apps) compatible with our system.

## 2020-07-24 NOTE — ED BEHAVIORAL HEALTH ASSESSMENT NOTE - OTHER
did not ambulate - in bed [facility] Records Checked: [facility], [facility], [facility], [facility], [facility], [facility], [facility], [facility] Outpatient BH, H[facility] ED, [facility] Inpatient, [facility] Outpatient, [facility] Inpatient, [facility] E&A, [facility] Inpatient, [facility] ED, Quick Docs, Healthix, Psyckes, [facility] Content Inpatient, One Content CL, [facility], Social Media (For example - Facebook, Kin[employer] Learning Gamesam, TapResearch), Web search, Forensic Databases UTI; losing function; selling of house

## 2020-07-24 NOTE — ED PROVIDER NOTE - ENMT, MLM
Airway patent, Nasal mucosa clear. Mouth with normal mucosa. Throat has no vesicles, no oropharyngeal exudates and uvula is midline. Airway patent, Face mask in place, Nasal mucosa clear. Mouth with normal mucosa. Throat has no vesicles, no oropharyngeal exudates and uvula is midline.

## 2020-07-24 NOTE — ED BEHAVIORAL HEALTH ASSESSMENT NOTE - HPI (INCLUDE ILLNESS QUALITY, SEVERITY, DURATION, TIMING, CONTEXT, MODIFYING FACTORS, ASSOCIATED SIGNS AND SYMPTOMS)
Patient is an 80yo  female, , domiciled at [facility], with past psych hx of depression and anxiety, no prior psychiatric hospitalizations, with history of suicidal statement but no prior self-injurious behaviors or suicide attempt, was referred by [place] and brought in by EMS for paranoia with agitation.     Patient presenting calm and cooperative; pleasant; linear, organized with evidence of thought disorder. Patient is oriented to all. Patient endorsing moderate depressed mood in the setting of being in [facility] and house being sold by daughter of which they agreed on; however feels sad about losing what she calls "my nest." Reports insomnia and has racing thoughts at night secondary to difficulty sleeping. Denies difficulty with concentration: patient is attentive during psychiatric evaluation. Reports intermittent hopelessness, and has made passive suicidal statements; but has no prior / current active suicidal ideation/intent/plan. Reports barriers being her children and grand children. Reports fear of death. Reports being spiritual. Denies manic / psychotic symptoms. Reports future orientation, with motivation to continue outpatient treatment. Engaged in safety planning.     Daughter and out-patient geriatrician providing provides collateral, stating patient has been paranoid, irritable, labile thinking daughter is out to get here. Reports this occurring / worsening in the last week. Of note, patient had UTI: WBC trending down. Dr. Moon reports concern over patient's medication non-compliance with Xanax: is supposed to be TID however has not taken it in 48 hours. Reports wanting her to take it at least once.

## 2020-07-24 NOTE — ED PROVIDER NOTE - PROGRESS NOTE DETAILS
Madisyn SOTELO for ED attending, Dr. Chaudhary: Elio [email or web address], directed to wellness, nobody to take call. Madisyn SOTELO for ED attending, Dr. Chaudhary: Guaiac of gastric content: Lot 175. Expiration 2-28-21. Vomit with small amount of blood. Guaiac positive. QC passed. Madisyn SOTELO for ED attending, Dr. Chaudhary: Guaiac of gastric content: Lot 175. Expiration 2-28-21. Vomit slight blood tinged. Guaiac positive. QC passed. Spoke to Dr. Moon. States that she last saw pt yesterday. Pt stopped taking meds yesterday including Xanax TID that is given to her. Afraid pt's symtpoms are consistent aroldo withdrawal. Pt is very paranoid and thinks her kids are out to get her. Ordered Ativan 1mg IV now and number info given to [facility]. Dr. Moon: [phone]. -Saige STILES. Spoke to Torres np. Pt has been fully cooperative and nondelusional while in the er. He has spoken to Dr. Moon and pt is agreeable to continuing meds. 2 liters nss almost infused. Ativan given.  Will repeat bmp and reeval.  Torres will send Remuron script. Saige STILES

## 2020-07-24 NOTE — ED ADULT NURSE NOTE - CHIEF COMPLAINT QUOTE
pt presents to ED with complaints of suicidal thoughts and withdrawal from xanax. pt has been sneaking xanax in [place] [facility] ([facility]). pt has not had xanax in 3 days. pt has been making suicidal statements to staff and threatened to wrap a phone cord around her neck. withdrawal symptoms consist of N/V, headache, and dizziness.

## 2020-07-24 NOTE — ED BEHAVIORAL HEALTH ASSESSMENT NOTE - SAFETY PLAN DETAILS
No safety plan indicated at this time. Patient advised to return to ED if symptoms worsen or if thoughts to harm self or others occurs. Patient instructed to return to the ED or call 911 if patient experiences SI, HI, hopelessness, worsening of symptoms or has any other concerns.

## 2020-07-24 NOTE — ED PROVIDER NOTE - NEUROLOGICAL, MLM
Alert and oriented, no focal deficits, no motor or sensory deficits. Alert and oriented x2, no focal deficits, no motor or sensory deficits.

## 2020-07-24 NOTE — ED ADULT NURSE REASSESSMENT NOTE - COMFORT CARE
repositioned/assisted in cleaning and changing patient brief and sheets. patient is clean and dry at this time./side rails up
side rails up/repositioned/assisted in cleaning and changing patient brief and sheets. patient is clean and dry at this time.
side rails up/repositioned/assisted in cleaning and changing patient brief and sheet before transport. patient is clean and dry at this time.

## 2020-07-24 NOTE — ED PROVIDER NOTE - PSYCHIATRIC, MLM
Alert and oriented to person, place, time/situation. normal mood and affect. no apparent risk to self or others. Alert and oriented x2. normal mood and affect. Pt denies SI/HI.

## 2020-07-24 NOTE — ED BEHAVIORAL HEALTH ASSESSMENT NOTE - SUMMARY
Patient is an 80yo  female, , domiciled at [facility], with past psych hx of depression and anxiety, no prior psychiatric hospitalizations, with history of suicidal statement but no prior self-injurious behaviors or suicide attempt, was referred by [place] and brought in by EMS for paranoia with agitation.     Patient presenting calm and cooperative; pleasant; linear, organized with evidence of thought disorder. Patient has no manic / psychotic symptoms. No delusions elicited. Patient has moderate depression. Patient has no suicidal ideation. Patient mood lability, agitation and ?paranoia as reported by collateral may be influenced by presence of UTI. Interpersonal dynamics with family may be at play also. Patient has therapeutic relationships and social supports. Patient is future oriented. Patient engaged in safety planning. Patient symptoms not indicating imminent risk for harm to self; not warranting involuntary in-patient hospitalization.

## 2020-07-24 NOTE — ED BEHAVIORAL HEALTH ASSESSMENT NOTE - MEDICATIONS (PRESCRIPTIONS, DIRECTIONS)
No changes Zoloft 150 mg daily (NOT at bedtime). Remeron 7.5 mg at bedtime (increase to 15 mg if ineffective for sleep). Xanax 0.5 mg once daily in the afternoon. Discontinue Valium at bedtime.

## 2020-07-24 NOTE — ED PROVIDER NOTE - CONSTITUTIONAL, MLM
normal... Well appearing, awake, alert, oriented to person, place, time/situation and in no apparent distress. Well appearing, awake, alert, oriented x2  and in no apparent distress.

## 2020-07-24 NOTE — ED PROVIDER NOTE - OBJECTIVE STATEMENT
78 y/o female with PMHx of HLD, HTN, Osteoporosis, gall stones, chronic back pain and spinal stenosis presents to the ED BIBEMS c/o SI. Pt 78 y/o female with PMHx of HLD, HTN, Osteoporosis, gall stones, chronic back pain and spinal stenosis presents to the ED BIBEMS c/o SI. Pt reports she 80 y/o female with PMHx of HLD, HTN, Osteoporosis, gall stones, chronic back pain and spinal stenosis presents to the ED BIBEMS c/o SI. Pt reports she NKDA. PCP: Dr. Phan Ellis. 78 y/o female with PMHx of gall stones, HLD, HTN, chronic back pain and spinal stenosis, osteoporosis, [facility] [place] [facility]. NH paperwork states pt paranoid and delusional. Pt reports n/v. Denies SI/HI. States she is on Xanax occasionally. No known sick contacts. NKDA. No other complaints at this time. PCP: Dr. Nathan Quezada.

## 2020-07-24 NOTE — ED ADULT NURSE REASSESSMENT NOTE - GENERAL PATIENT STATE
comfortable appearance
resting/sleeping/comfortable appearance
resting/sleeping/comfortable appearance

## 2020-07-24 NOTE — ED PROVIDER NOTE - NSFOLLOWUPINSTRUCTIONS_ED_ALL_ED_FT
Continue Xanax as directed and start Remuron as directed. Drink and eat regularly. Have your electrolytes rechecked on Monday by your doctor. Increase salt in your diet. Return to ER if worse.

## 2020-07-24 NOTE — ED ADULT TRIAGE NOTE - CHIEF COMPLAINT QUOTE
pt presents to ED with complaints of suicidal thoughts and withdrawal from xanax. pt has been sneaking xanax in [place] assisted living ([facility]). pt has not had xanax in 3 days. pt has been making suicidal statements to staff and threatened to wrap a phone cord around her neck. withdrawal symptoms consist of N/V, headache, and dizziness.

## 2020-07-25 LAB
SARS-COV-2 IGG SERPL QL IA: NEGATIVE — SIGNIFICANT CHANGE UP
SARS-COV-2 IGM SERPL IA-ACNC: 0.09 INDEX — SIGNIFICANT CHANGE UP

## 2020-07-26 ENCOUNTER — INPATIENT (INPATIENT)
Facility: HOSPITAL | Age: 80
LOS: 3 days | Discharge: NURSING, REHAB OR LONG-TERM CARE | DRG: 880 | End: 2020-07-30
Attending: INTERNAL MEDICINE | Admitting: INTERNAL MEDICINE
Payer: MEDICARE

## 2020-07-26 VITALS
WEIGHT: 164.91 LBS | DIASTOLIC BLOOD PRESSURE: 87 MMHG | RESPIRATION RATE: 16 BRPM | HEART RATE: 76 BPM | SYSTOLIC BLOOD PRESSURE: 188 MMHG | TEMPERATURE: 98 F | OXYGEN SATURATION: 99 % | HEIGHT: 68 IN

## 2020-07-26 DIAGNOSIS — Z98.890 OTHER SPECIFIED POSTPROCEDURAL STATES: Chronic | ICD-10-CM

## 2020-07-26 DIAGNOSIS — Z92.241 PERSONAL HISTORY OF SYSTEMIC STEROID THERAPY: Chronic | ICD-10-CM

## 2020-07-26 DIAGNOSIS — R07.9 CHEST PAIN, UNSPECIFIED: ICD-10-CM

## 2020-07-26 LAB
ALBUMIN SERPL ELPH-MCNC: 4 G/DL — SIGNIFICANT CHANGE UP (ref 3.3–5)
ALP SERPL-CCNC: 76 U/L — SIGNIFICANT CHANGE UP (ref 40–120)
ALT FLD-CCNC: 34 U/L — SIGNIFICANT CHANGE UP (ref 12–78)
ANION GAP SERPL CALC-SCNC: 8 MMOL/L — SIGNIFICANT CHANGE UP (ref 5–17)
APAP SERPL-MCNC: <2 UG/ML — LOW (ref 10–30)
APPEARANCE UR: CLEAR — SIGNIFICANT CHANGE UP
APTT BLD: 27.9 SEC — SIGNIFICANT CHANGE UP (ref 27.5–35.5)
AST SERPL-CCNC: 23 U/L — SIGNIFICANT CHANGE UP (ref 15–37)
BASOPHILS # BLD AUTO: 0.06 K/UL — SIGNIFICANT CHANGE UP (ref 0–0.2)
BASOPHILS NFR BLD AUTO: 0.5 % — SIGNIFICANT CHANGE UP (ref 0–2)
BILIRUB SERPL-MCNC: 0.3 MG/DL — SIGNIFICANT CHANGE UP (ref 0.2–1.2)
BILIRUB UR-MCNC: NEGATIVE — SIGNIFICANT CHANGE UP
BUN SERPL-MCNC: 20 MG/DL — SIGNIFICANT CHANGE UP (ref 7–23)
CALCIUM SERPL-MCNC: 9.6 MG/DL — SIGNIFICANT CHANGE UP (ref 8.5–10.1)
CHLORIDE SERPL-SCNC: 100 MMOL/L — SIGNIFICANT CHANGE UP (ref 96–108)
CO2 SERPL-SCNC: 26 MMOL/L — SIGNIFICANT CHANGE UP (ref 22–31)
COLOR SPEC: YELLOW — SIGNIFICANT CHANGE UP
CREAT SERPL-MCNC: 1.12 MG/DL — SIGNIFICANT CHANGE UP (ref 0.5–1.3)
DIFF PNL FLD: ABNORMAL
EOSINOPHIL # BLD AUTO: 0.45 K/UL — SIGNIFICANT CHANGE UP (ref 0–0.5)
EOSINOPHIL NFR BLD AUTO: 4 % — SIGNIFICANT CHANGE UP (ref 0–6)
GLUCOSE SERPL-MCNC: 106 MG/DL — HIGH (ref 70–99)
GLUCOSE UR QL: NEGATIVE MG/DL — SIGNIFICANT CHANGE UP
HCT VFR BLD CALC: 32.7 % — LOW (ref 34.5–45)
HGB BLD-MCNC: 10.9 G/DL — LOW (ref 11.5–15.5)
IMM GRANULOCYTES NFR BLD AUTO: 0.4 % — SIGNIFICANT CHANGE UP (ref 0–1.5)
INR BLD: 1.05 RATIO — SIGNIFICANT CHANGE UP (ref 0.88–1.16)
KETONES UR-MCNC: NEGATIVE — SIGNIFICANT CHANGE UP
LEUKOCYTE ESTERASE UR-ACNC: ABNORMAL
LYMPHOCYTES # BLD AUTO: 1.42 K/UL — SIGNIFICANT CHANGE UP (ref 1–3.3)
LYMPHOCYTES # BLD AUTO: 12.5 % — LOW (ref 13–44)
MCHC RBC-ENTMCNC: 31.7 PG — SIGNIFICANT CHANGE UP (ref 27–34)
MCHC RBC-ENTMCNC: 33.3 GM/DL — SIGNIFICANT CHANGE UP (ref 32–36)
MCV RBC AUTO: 95.1 FL — SIGNIFICANT CHANGE UP (ref 80–100)
MONOCYTES # BLD AUTO: 0.8 K/UL — SIGNIFICANT CHANGE UP (ref 0–0.9)
MONOCYTES NFR BLD AUTO: 7.1 % — SIGNIFICANT CHANGE UP (ref 2–14)
NEUTROPHILS # BLD AUTO: 8.57 K/UL — HIGH (ref 1.8–7.4)
NEUTROPHILS NFR BLD AUTO: 75.5 % — SIGNIFICANT CHANGE UP (ref 43–77)
NITRITE UR-MCNC: NEGATIVE — SIGNIFICANT CHANGE UP
PH UR: 6.5 — SIGNIFICANT CHANGE UP (ref 5–8)
PLATELET # BLD AUTO: 314 K/UL — SIGNIFICANT CHANGE UP (ref 150–400)
POTASSIUM SERPL-MCNC: 3.3 MMOL/L — LOW (ref 3.5–5.3)
POTASSIUM SERPL-SCNC: 3.3 MMOL/L — LOW (ref 3.5–5.3)
PROT SERPL-MCNC: 7.7 GM/DL — SIGNIFICANT CHANGE UP (ref 6–8.3)
PROT UR-MCNC: 30 MG/DL
PROTHROM AB SERPL-ACNC: 12.2 SEC — SIGNIFICANT CHANGE UP (ref 10.6–13.6)
RBC # BLD: 3.44 M/UL — LOW (ref 3.8–5.2)
RBC # FLD: 12.1 % — SIGNIFICANT CHANGE UP (ref 10.3–14.5)
SALICYLATES SERPL-MCNC: <1.7 MG/DL — LOW (ref 2.8–20)
SARS-COV-2 IGG SERPL QL IA: NEGATIVE — SIGNIFICANT CHANGE UP
SARS-COV-2 IGM SERPL IA-ACNC: 0.09 INDEX — SIGNIFICANT CHANGE UP
SARS-COV-2 RNA SPEC QL NAA+PROBE: SIGNIFICANT CHANGE UP
SODIUM SERPL-SCNC: 134 MMOL/L — LOW (ref 135–145)
SP GR SPEC: 1 — LOW (ref 1.01–1.02)
TROPONIN I SERPL-MCNC: 0.04 NG/ML — SIGNIFICANT CHANGE UP (ref 0.01–0.04)
TROPONIN I SERPL-MCNC: 0.04 NG/ML — SIGNIFICANT CHANGE UP (ref 0.01–0.04)
UROBILINOGEN FLD QL: NEGATIVE MG/DL — SIGNIFICANT CHANGE UP
WBC # BLD: 11.34 K/UL — HIGH (ref 3.8–10.5)
WBC # FLD AUTO: 11.34 K/UL — HIGH (ref 3.8–10.5)

## 2020-07-26 PROCEDURE — 99223 1ST HOSP IP/OBS HIGH 75: CPT | Mod: AI

## 2020-07-26 PROCEDURE — 84484 ASSAY OF TROPONIN QUANT: CPT

## 2020-07-26 PROCEDURE — 93010 ELECTROCARDIOGRAM REPORT: CPT

## 2020-07-26 PROCEDURE — 36415 COLL VENOUS BLD VENIPUNCTURE: CPT

## 2020-07-26 PROCEDURE — 70450 CT HEAD/BRAIN W/O DYE: CPT | Mod: 26

## 2020-07-26 PROCEDURE — 71045 X-RAY EXAM CHEST 1 VIEW: CPT | Mod: 26

## 2020-07-26 PROCEDURE — 97162 PT EVAL MOD COMPLEX 30 MIN: CPT | Mod: GP

## 2020-07-26 PROCEDURE — 85027 COMPLETE CBC AUTOMATED: CPT

## 2020-07-26 PROCEDURE — 80048 BASIC METABOLIC PNL TOTAL CA: CPT

## 2020-07-26 PROCEDURE — 80307 DRUG TEST PRSMV CHEM ANLYZR: CPT

## 2020-07-26 PROCEDURE — 87086 URINE CULTURE/COLONY COUNT: CPT

## 2020-07-26 PROCEDURE — 83735 ASSAY OF MAGNESIUM: CPT

## 2020-07-26 PROCEDURE — 97530 THERAPEUTIC ACTIVITIES: CPT | Mod: GP

## 2020-07-26 PROCEDURE — 87186 SC STD MICRODIL/AGAR DIL: CPT

## 2020-07-26 PROCEDURE — 97116 GAIT TRAINING THERAPY: CPT | Mod: GP

## 2020-07-26 PROCEDURE — 86769 SARS-COV-2 COVID-19 ANTIBODY: CPT

## 2020-07-26 RX ORDER — METOPROLOL TARTRATE 50 MG
25 TABLET ORAL
Refills: 0 | Status: DISCONTINUED | OUTPATIENT
Start: 2020-07-26 | End: 2020-07-30

## 2020-07-26 RX ORDER — DIAZEPAM 5 MG
1 TABLET ORAL AT BEDTIME
Refills: 0 | Status: DISCONTINUED | OUTPATIENT
Start: 2020-07-26 | End: 2020-07-27

## 2020-07-26 RX ORDER — CEFUROXIME AXETIL 250 MG
1 TABLET ORAL
Qty: 0 | Refills: 0 | DISCHARGE

## 2020-07-26 RX ORDER — ONDANSETRON 8 MG/1
4 TABLET, FILM COATED ORAL EVERY 6 HOURS
Refills: 0 | Status: DISCONTINUED | OUTPATIENT
Start: 2020-07-26 | End: 2020-07-27

## 2020-07-26 RX ORDER — ALPRAZOLAM 0.25 MG
0.5 TABLET ORAL
Refills: 0 | Status: DISCONTINUED | OUTPATIENT
Start: 2020-07-26 | End: 2020-07-28

## 2020-07-26 RX ORDER — POTASSIUM CHLORIDE 20 MEQ
40 PACKET (EA) ORAL ONCE
Refills: 0 | Status: COMPLETED | OUTPATIENT
Start: 2020-07-26 | End: 2020-07-26

## 2020-07-26 RX ORDER — RANITIDINE HYDROCHLORIDE 150 MG/1
1 TABLET, FILM COATED ORAL
Qty: 0 | Refills: 0 | DISCHARGE

## 2020-07-26 RX ORDER — METOPROLOL TARTRATE 50 MG
1 TABLET ORAL
Qty: 0 | Refills: 0 | DISCHARGE

## 2020-07-26 RX ORDER — SERTRALINE 25 MG/1
150 TABLET, FILM COATED ORAL DAILY
Refills: 0 | Status: DISCONTINUED | OUTPATIENT
Start: 2020-07-26 | End: 2020-07-30

## 2020-07-26 RX ORDER — GABAPENTIN 400 MG/1
100 CAPSULE ORAL AT BEDTIME
Refills: 0 | Status: DISCONTINUED | OUTPATIENT
Start: 2020-07-26 | End: 2020-07-30

## 2020-07-26 RX ORDER — ATORVASTATIN CALCIUM 80 MG/1
40 TABLET, FILM COATED ORAL AT BEDTIME
Refills: 0 | Status: DISCONTINUED | OUTPATIENT
Start: 2020-07-26 | End: 2020-07-30

## 2020-07-26 RX ORDER — ASPIRIN/CALCIUM CARB/MAGNESIUM 324 MG
325 TABLET ORAL ONCE
Refills: 0 | Status: COMPLETED | OUTPATIENT
Start: 2020-07-26 | End: 2020-07-26

## 2020-07-26 RX ORDER — LABETALOL HCL 100 MG
10 TABLET ORAL ONCE
Refills: 0 | Status: COMPLETED | OUTPATIENT
Start: 2020-07-26 | End: 2020-07-26

## 2020-07-26 RX ADMIN — Medication 1 MILLIGRAM(S): at 21:18

## 2020-07-26 RX ADMIN — Medication 0.5 MILLIGRAM(S): at 21:18

## 2020-07-26 RX ADMIN — ONDANSETRON 4 MILLIGRAM(S): 8 TABLET, FILM COATED ORAL at 21:18

## 2020-07-26 RX ADMIN — Medication 25 MILLIGRAM(S): at 21:20

## 2020-07-26 RX ADMIN — Medication 325 MILLIGRAM(S): at 13:11

## 2020-07-26 RX ADMIN — Medication 30 MILLILITER(S): at 13:13

## 2020-07-26 RX ADMIN — ATORVASTATIN CALCIUM 40 MILLIGRAM(S): 80 TABLET, FILM COATED ORAL at 21:20

## 2020-07-26 RX ADMIN — GABAPENTIN 100 MILLIGRAM(S): 400 CAPSULE ORAL at 21:18

## 2020-07-26 RX ADMIN — Medication 10 MILLIGRAM(S): at 12:50

## 2020-07-26 RX ADMIN — Medication 40 MILLIEQUIVALENT(S): at 21:19

## 2020-07-26 NOTE — H&P ADULT - NSHPPOAPULMEMBOLUS_GEN_A_CORE
April 9, 2018     Patient: Nicole Leon   YOB: 1956   Date of Visit: 4/9/2018       To Whom It May Concern:    Please excuse Cayetano Moralessparkle from work 04/09/2018 until follow-up with orthopedics         Sincerely,        Darren Lara PA-C    CC: No Recipients no

## 2020-07-26 NOTE — ED PROVIDER NOTE - NS ED ROS FT
Constitutional: +diaphoresis, No fever  Eyes: No visual changes  HEENT: No throat pain  CV: +chest pain  Resp: +mild SOB, no cough  GI: +nausea. No abd pain or vomiting  : No dysuria  MSK: No musculoskeletal pain  Skin: No rash  Neuro: No headache   Psych: No SI, no hallucinations Constitutional: +diaphoresis, No fever  Eyes: No visual changes  HEENT: No throat pain  CV: +chest discomfort  Resp: +mild SOB, no cough  GI: +nausea. No abd pain or vomiting  : No dysuria  MSK: No musculoskeletal pain  Skin: No rash  Neuro: No headache   Psych: No SI, no hallucinations

## 2020-07-26 NOTE — ED ADULT NURSE NOTE - OBJECTIVE STATEMENT
Pt. to the ED BIBA from Assisted living C/O Chest Pain and " being afraid of having Heart Attack" + SOB and + Nausea. Pt. reports and increased stress in life.. Denies SI/HI- Pt. states " I have a lot to live for". Hx. of HTN, HLD and Recent UTI ( On PO Antibiotics)- IV and Labs as ordered. Placed on cardiac monitor. Will cont to monitor patient closely- CO in place for safety

## 2020-07-26 NOTE — ED ADULT NURSE NOTE - NSIMPLEMENTINTERV_GEN_ALL_ED
Implemented All Fall with Harm Risk Interventions:  [place] to call system. Call bell, personal items and telephone within reach. Instruct patient to call for assistance. Room bathroom lighting operational. Non-slip footwear when patient is off stretcher. Physically safe environment: no spills, clutter or unnecessary equipment. Stretcher in lowest position, wheels locked, appropriate side rails in place. Provide visual cue, wrist band, yellow gown, etc. Monitor gait and stability. Monitor for mental status changes and reorient to person, place, and time. Review medications for side effects contributing to fall risk. Reinforce activity limits and safety measures with patient and family. Provide visual clues: red socks.

## 2020-07-26 NOTE — H&P ADULT - NSHPLABSRESULTS_GEN_ALL_CORE
10.9   11.34 )-----------( 314      ( 26 Jul 2020 10:56 )             32.7   07-26    134<L>  |  100  |  20  ----------------------------<  106<H>  3.3<L>   |  26  |  1.12    Ca    9.6      26 Jul 2020 10:56    TPro  7.7  /  Alb  4.0  /  TBili  0.3  /  DBili  x   /  AST  23  /  ALT  34  /  AlkPhos  76  07-26

## 2020-07-26 NOTE — ED PROVIDER NOTE - CARE PLAN
Principal Discharge DX:	Chest pain  Secondary Diagnosis:	Uncontrolled hypertension  Secondary Diagnosis:	Agitation  Secondary Diagnosis:	Altered mental status

## 2020-07-26 NOTE — H&P ADULT - NSICDXPASTMEDICALHX_GEN_ALL_CORE_FT
PAST MEDICAL HISTORY:  Chronic back pain, unspecified back location, unspecified back pain laterality     Closed compression fracture of second lumbar vertebra, sequela     Gall stones     Hyperlipidemia, unspecified hyperlipidemia type     Hypertension, unspecified type     Osteoporosis     Spinal stenosis     Spinal stenosis of lumbar region, unspecified whether neurogenic claudication present

## 2020-07-26 NOTE — H&P ADULT - HISTORY OF PRESENT ILLNESS
78 y/o female with a PMHx of HTN, HLD, chronic back pain, gall stones, osteoporosis, spinal stenosis, closed compression fracture of second lumbar vertebra, presents to the ED BIBEMS from [facility] assisted living [facility] c/o intermittent chest discomfort that began this morning. Pt reports earlier today she had mild SOB, diaphoresis, and nausea this morning. Pt was seen at [facility] yesterday c/o vomiting, paranoia, and delusions, Dx with vomiting, depression, and hyponatremia, and discharged back to facility the same day. Pt notes increased stress in her life with family issues, "what is going on in this world", and her daughters placing her in an assisted living facility against her will. Pt states she chose to come to the ED today because of increased stress and fear of having a heart attack. Denies SI, hallucinations, and does not have thought of hurting herself because she "has too much to live for." Pt seems v anxious and attributes high BP to anxiety. PCP: Dr. Nathan Quezada      PMHx:Lumbago, LSpinal stenosis, HLD, HTN, HLD,Osteoporosis,     PSHx: MARIA C, lumbar nerve ablation,     Family Hx:Father  at age 62 of MI, Mother  of old age at 91.    Social Hx.: not smoking, no alcohol use

## 2020-07-26 NOTE — ED PROVIDER NOTE - NS_ ATTENDINGSCRIBEDETAILS _ED_A_ED_FT
I, Bc Rios MD,  performed the initial face to face bedside interview with this patient regarding history of present illness, review of symptoms and relevant past medical, social and family history.  I completed an independent physical examination.  I was the initial provider who evaluated this patient.  The history, relevant review of systems, past medical and surgical history, medical decision making, and physical examination was documented by the scribe in my presence and I attest to the accuracy of the documentation.

## 2020-07-26 NOTE — ED PROVIDER NOTE - PHYSICAL EXAMINATION
Constitutional: NAD AAOx3  Eyes: PERRLA EOMI  Head: Normocephalic atraumatic  Mouth: MMM  Cardiac: regular rate   Resp: Lungs CTAB  GI: Abd s/nt/nd  Neuro: CN2-12 intact  Skin: No rashes Constitutional: NAD AAOx3  Eyes: PERRLA EOMI  Head: Normocephalic atraumatic  Mouth: MMM  Cardiac: regular rate no LE swelling normal peripheral pulses  Resp: Lungs CTAB  GI: Abd s/nt/nd  Neuro: CN2-12 intact  Skin: No rashes  psych: no si or hi

## 2020-07-26 NOTE — ED PROVIDER NOTE - OBJECTIVE STATEMENT
78 y/o female with a PMHx of HTN, HLD, chronic back pain, gall stones, osteoporosis, spinal stenosis, closed compression fracture of second lumbar vertebra, presents to the ED BIBEMS from [facility] assisted living facility c/o intermittent chest pain that began this morning. Pt reports earlier today she had mild SOB, diaphoresis, and nausea this morning. Pt was seen at [facility] yesterday c/o vomiting, paranoia, and delusions, Dx with vomiting, depression, and hyponatremia, and discharged back to facility the same day. Pt notes increased stress in her life with family issues, "what is going on in this world", and her daughters placing her in an assisted living facility against her will. Pt states she chose to come to the ED today because of increased stress and fear of having a heart attack. Denies SI, hallucinations, and does not have thought of hurting herself because she "has too much to live for." Pt notes she was recently Dx with a UTI and placed on antibiotics. No other complaints at this time. NKDA. PCP: Dr. Nathan Quezada 78 y/o female with a PMHx of HTN, HLD, chronic back pain, gall stones, osteoporosis, spinal stenosis, closed compression fracture of second lumbar vertebra, presents to the ED BIBEMS from [facility] assisted living facility c/o intermittent chest discomfort that began this morning. Pt reports earlier today she had mild SOB, diaphoresis, and nausea this morning. Pt was seen at [facility] yesterday c/o vomiting, paranoia, and delusions, Dx with vomiting, depression, and hyponatremia, and discharged back to facility the same day. Pt notes increased stress in her life with family issues, "what is going on in this world", and her daughters placing her in an assisted living facility against her will. Pt states she chose to come to the ED today because of increased stress and fear of having a heart attack. Denies SI, hallucinations, and does not have thought of hurting herself because she "has too much to live for." Pt notes she was recently Dx with a UTI and placed on antibiotics. No other complaints at this time. NKDA. PCP: Dr. Nathan Quezada

## 2020-07-26 NOTE — ED ADULT NURSE REASSESSMENT NOTE - NS ED NURSE REASSESS COMMENT FT1
Received care of patient from Anabell Mcdonald RN. Patient resting comfortably, with 1:1 at the bedside. Patient placed on bedpan, jinny-care performed, sheets changed and diaper changed.

## 2020-07-26 NOTE — H&P ADULT - NSICDXPASTSURGICALHX_GEN_ALL_CORE_FT
PAST SURGICAL HISTORY:  H/O prior ablation treatment lumbar area    Status post epidural steroid injection lumbar

## 2020-07-26 NOTE — H&P ADULT - NSHPPHYSICALEXAM_GEN_ALL_CORE
HEENT: PRRL EOMI    MOUTH/TEETH/GUMS: Clear    NECK: no JVD    LUNGS: Clear    HEART: S1,S2 RR    ABDOMEN: soft nontender    EXTREMITIES:  trace bilateral pedal edema    MUSCULOSKELETAL: no joint swelling     NEURO: no tremor, no focal signs.    SKIN: skin tear L shin, L arm    : CVA negative,

## 2020-07-26 NOTE — H&P ADULT - ASSESSMENT
* Uncontrolled HTN  anxiety plays a role  prn labetalol  resume home meds, BB  chronic dizziness    * Hallucinations per facility   was seen here by behavioral health  monitor while hospitalized   seems v coherent and upset about assisted living    * Anxiety  prn benzo    * Hypokalemia  replace po

## 2020-07-26 NOTE — ED PROVIDER NOTE - PROGRESS NOTE DETAILS
Madisyn Pedroza for attending Dr. Rios: Spoke with daughter, states that pt symptoms seem to come on at night and she starts to get paranoid and delirious. Daughter agrees with lab work and CT and then will touch base. pt with elevated bp to 200 systolic 150 diastolic with cp - will admit endorsed to Dr. Marcano. Bc Rios M.D., Attending Physician

## 2020-07-26 NOTE — ED PROVIDER NOTE - CHPI ED SYMPTOMS POS
NAUSEA/SHORTNESS OF BREATH/CHEST PAIN/+diaphoresis NAUSEA/SHORTNESS OF BREATH/+diaphoresis +chest discomfort

## 2020-07-26 NOTE — ED PROVIDER NOTE - CLINICAL SUMMARY MEDICAL DECISION MAKING FREE TEXT BOX
80 y/o female with a PMHx of HTN, HLD, chronic back pain, gall stones, osteoporosis, spinal stenosis, closed compression fracture of second lumbar vertebra, presents to the ED for chest discomfort. Pt is at assisted living, also noted to have episodes of agitation. Was seen in ED 2 days ago and had labs and psych consult and deemed safe for d/c. Sent back for further eval. Now pt is AO x3, well appearing in NAD. Will obtain labs and imaging and reassess. 80 y/o female with a PMHx of HTN, HLD, chronic back pain, gall stones, osteoporosis, spinal stenosis, closed compression fracture of second lumbar vertebra, presents to the ED for chest discomfort. Pt is at assisted living, also noted to have episodes of agitation. Was seen in ED 2 days ago and had labs and psych consult and deemed safe for d/c. Sent back for further eval. Now pt is A&O x3, well appearing in NAD. Will obtain labs and imaging and reassess. 80 y/o female with a PMHx of HTN, HLD, chronic back pain, gall stones, osteoporosis, spinal stenosis, closed compression fracture of second lumbar vertebra, presents to the ED for chest discomfort. Pt is at assisted living, also noted to have episodes of agitation. Was seen in ED 2 days ago and had labs and psych consult and deemed safe for d/c. Sent back for further eval. Now pt is A&O x3, well appearing in NAD but complaining of chest pain. pain is center of chest comes and goes non-radiating. Will obtain labs and imaging and reassess.

## 2020-07-26 NOTE — PATIENT PROFILE ADULT - NSPROPTRIGHTNOTIFY_GEN_A_NUR
Pressures are either too low in the 110's>120s or too high in the 180s > 190s  She is symptomatic on both ends of the spectrum  Unable to stabilize  Cardiology input sought and appreciated   declines

## 2020-07-27 DIAGNOSIS — F32.9 MAJOR DEPRESSIVE DISORDER, SINGLE EPISODE, UNSPECIFIED: ICD-10-CM

## 2020-07-27 DIAGNOSIS — F41.1 GENERALIZED ANXIETY DISORDER: ICD-10-CM

## 2020-07-27 LAB
ANION GAP SERPL CALC-SCNC: 6 MMOL/L — SIGNIFICANT CHANGE UP (ref 5–17)
BUN SERPL-MCNC: 15 MG/DL — SIGNIFICANT CHANGE UP (ref 7–23)
CALCIUM SERPL-MCNC: 9.4 MG/DL — SIGNIFICANT CHANGE UP (ref 8.5–10.1)
CHLORIDE SERPL-SCNC: 103 MMOL/L — SIGNIFICANT CHANGE UP (ref 96–108)
CO2 SERPL-SCNC: 28 MMOL/L — SIGNIFICANT CHANGE UP (ref 22–31)
CREAT SERPL-MCNC: 1.08 MG/DL — SIGNIFICANT CHANGE UP (ref 0.5–1.3)
GLUCOSE SERPL-MCNC: 101 MG/DL — HIGH (ref 70–99)
HCT VFR BLD CALC: 32.2 % — LOW (ref 34.5–45)
HGB BLD-MCNC: 10.6 G/DL — LOW (ref 11.5–15.5)
MAGNESIUM SERPL-MCNC: 2.1 MG/DL — SIGNIFICANT CHANGE UP (ref 1.6–2.6)
MCHC RBC-ENTMCNC: 31.8 PG — SIGNIFICANT CHANGE UP (ref 27–34)
MCHC RBC-ENTMCNC: 32.9 GM/DL — SIGNIFICANT CHANGE UP (ref 32–36)
MCV RBC AUTO: 96.7 FL — SIGNIFICANT CHANGE UP (ref 80–100)
PLATELET # BLD AUTO: 299 K/UL — SIGNIFICANT CHANGE UP (ref 150–400)
POTASSIUM SERPL-MCNC: 3.9 MMOL/L — SIGNIFICANT CHANGE UP (ref 3.5–5.3)
POTASSIUM SERPL-SCNC: 3.9 MMOL/L — SIGNIFICANT CHANGE UP (ref 3.5–5.3)
RBC # BLD: 3.33 M/UL — LOW (ref 3.8–5.2)
RBC # FLD: 12.3 % — SIGNIFICANT CHANGE UP (ref 10.3–14.5)
SODIUM SERPL-SCNC: 137 MMOL/L — SIGNIFICANT CHANGE UP (ref 135–145)
WBC # BLD: 9.15 K/UL — SIGNIFICANT CHANGE UP (ref 3.8–10.5)
WBC # FLD AUTO: 9.15 K/UL — SIGNIFICANT CHANGE UP (ref 3.8–10.5)

## 2020-07-27 PROCEDURE — 99233 SBSQ HOSP IP/OBS HIGH 50: CPT

## 2020-07-27 PROCEDURE — 99232 SBSQ HOSP IP/OBS MODERATE 35: CPT

## 2020-07-27 RX ORDER — MIRTAZAPINE 45 MG/1
7.5 TABLET, ORALLY DISINTEGRATING ORAL AT BEDTIME
Refills: 0 | Status: DISCONTINUED | OUTPATIENT
Start: 2020-07-27 | End: 2020-07-29

## 2020-07-27 RX ORDER — HEPARIN SODIUM 5000 [USP'U]/ML
5000 INJECTION INTRAVENOUS; SUBCUTANEOUS EVERY 12 HOURS
Refills: 0 | Status: DISCONTINUED | OUTPATIENT
Start: 2020-07-27 | End: 2020-07-30

## 2020-07-27 RX ORDER — ACETAMINOPHEN 500 MG
650 TABLET ORAL EVERY 6 HOURS
Refills: 0 | Status: DISCONTINUED | OUTPATIENT
Start: 2020-07-27 | End: 2020-07-30

## 2020-07-27 RX ORDER — AMLODIPINE BESYLATE 2.5 MG/1
2.5 TABLET ORAL DAILY
Refills: 0 | Status: DISCONTINUED | OUTPATIENT
Start: 2020-07-27 | End: 2020-07-30

## 2020-07-27 RX ADMIN — ATORVASTATIN CALCIUM 40 MILLIGRAM(S): 80 TABLET, FILM COATED ORAL at 21:38

## 2020-07-27 RX ADMIN — GABAPENTIN 100 MILLIGRAM(S): 400 CAPSULE ORAL at 21:38

## 2020-07-27 RX ADMIN — Medication 650 MILLIGRAM(S): at 13:17

## 2020-07-27 RX ADMIN — Medication 650 MILLIGRAM(S): at 12:57

## 2020-07-27 RX ADMIN — SERTRALINE 150 MILLIGRAM(S): 25 TABLET, FILM COATED ORAL at 10:48

## 2020-07-27 RX ADMIN — Medication 0.5 MILLIGRAM(S): at 10:48

## 2020-07-27 RX ADMIN — Medication 0.5 MILLIGRAM(S): at 21:38

## 2020-07-27 RX ADMIN — AMLODIPINE BESYLATE 2.5 MILLIGRAM(S): 2.5 TABLET ORAL at 17:20

## 2020-07-27 RX ADMIN — HEPARIN SODIUM 5000 UNIT(S): 5000 INJECTION INTRAVENOUS; SUBCUTANEOUS at 17:20

## 2020-07-27 RX ADMIN — Medication 25 MILLIGRAM(S): at 21:37

## 2020-07-27 RX ADMIN — Medication 25 MILLIGRAM(S): at 10:48

## 2020-07-27 RX ADMIN — MIRTAZAPINE 7.5 MILLIGRAM(S): 45 TABLET, ORALLY DISINTEGRATING ORAL at 21:38

## 2020-07-27 NOTE — BEHAVIORAL HEALTH ASSESSMENT NOTE - SUMMARY
Patient is an 78yo  female, , domiciled at [facility], with past psych hx of depression and anxiety, no prior psychiatric hospitalizations, with history of suicidal statement but no prior self-injurious behaviors or suicide attempt, was referred by [facility] and brought in by EMS for paranoia with agitation.     Patient presenting calm and cooperative; pleasant; linear, organized with evidence of thought disorder. Patient has no manic / psychotic symptoms. No delusions elicited. Patient has moderate depression. Patient has no suicidal ideation. Patient mood lability, agitation and ?paranoia as reported by collateral may be influenced by presence of UTI. Interpersonal dynamics with family may be at play also. Patient has therapeutic relationships and social supports. Patient is future oriented. Patient engaged in safety planning. Patient symptoms not indicating imminent risk for harm to self; not warranting involuntary in-patient hospitalization.    PLAN  1. Zoloft 150 mg daily  2. Remeron 7.5 mg at bedtime  3. Xanax 0.5 mg twice daily

## 2020-07-27 NOTE — BEHAVIORAL HEALTH ASSESSMENT NOTE - NS ED BHA BENZODIAZEPINES
Physician Discharge Summary     Patient ID:  Jacobo Kurtz  [id number]  69 year old  1948    Admit date: 4/5/2018    Discharge date and time: 4/6/2018    Admitting Physician:Miesha    Discharge Physician: Miesha    Admission Diagnoses: Primary osteoarthritis of both knees     Discharge Diagnoses: Primary osteoarthritis of both knees , Blood Loss Anemia    Admission Condition: Good    Discharged Condition: Good    Indication for Admission: Joint Replacement    Hospital Course: No Complications    Consults: Hospitalist    Surgery: 07746 - Total Knee Arthroplasty Right cemented     Discharge Exam: Alert and comfortable. Wound healing well.    Disposition: Home with home health care    Patient Instructions:   Activity: Activities as tolerated.  Diet: Resume usual diet.  Wound Care: May shower and wash wound with soap and water  Anticoagulation: Continue for four weeks post op  Follow-up: Call my office to schedule a follow up visit at 10 to 14 days after the surgery                    ([phone])    Signed:  Gorge Larkin PA-C  4/6/2018  7:56 AM       None known

## 2020-07-27 NOTE — BEHAVIORAL HEALTH ASSESSMENT NOTE - RISK ASSESSMENT
Low Acute Suicide Risk LOW RISK     ACUTE RISK FACTORS: recent mood lability with agitation, feeling alone, medication non-compliance     CHRONIC RISK FACTORS: depression,      PROTECTIVE FACTORS: no suicidal ideation/intent/plan, future oriented, motivation for psychiatric treatment, engaged in safety planning.

## 2020-07-27 NOTE — CONSULT NOTE ADULT - ASSESSMENT
imp:  1. chest pain likely due to anxiety. no evidence of ACS  based on troponin and ecg.  2. htn. suboptimal control  3 hld    Plan; agree with BB and addition of amlodipine. follow bp. daughter states that stress may have been done in dec.  repeat troponin  will follow

## 2020-07-27 NOTE — BEHAVIORAL HEALTH ASSESSMENT NOTE - HPI (INCLUDE ILLNESS QUALITY, SEVERITY, DURATION, TIMING, CONTEXT, MODIFYING FACTORS, ASSOCIATED SIGNS AND SYMPTOMS)
PATIENT SEEN 7.24.2020 : Patient is an 78yo  female, , domiciled at [facility], with past psych hx of depression and anxiety, no prior psychiatric hospitalizations, with history of suicidal statement but no prior self-injurious behaviors or suicide attempt, was referred by [place] and brought in by EMS for paranoia with agitation.     Patient presenting calm and cooperative; pleasant; linear, organized with evidence of thought disorder. Patient is oriented to all. Patient endorsing moderate depressed mood in the setting of being in [place] and house being sold by daughter of which they agreed on; however feels sad about losing what she calls "my nest." Reports insomnia and has racing thoughts at night secondary to difficulty sleeping. Denies difficulty with concentration: patient is attentive during psychiatric evaluation. Reports intermittent hopelessness, and has made passive suicidal statements; but has no prior / current active suicidal ideation/intent/plan. Reports barriers being her children and grand children. Reports fear of death. Reports being spiritual. Denies manic / psychotic symptoms. Reports future orientation, with motivation to continue outpatient treatment. Engaged in safety planning.     Daughter and out-patient geriatrician providing provides collateral, stating patient has been paranoid, irritable, labile thinking daughter is out to get here. Reports this occurring / worsening in the last week. Of note, patient had UTI: WBC trending down. Dr. Moon reports concern over patient's medication non-compliance with Xanax: is supposed to be TID however has not taken it in 48 hours. Reports wanting her to take it at least once.    7.27.2020: Patient endorsing moderate depressed mood in the setting of being in hospital; anhedonia; with anxiety, excessive worrying, difficulty sleeping at times. Denies hopelessness, or suicidal ideation. Reports having children and grandchildren and wanting to improve her medical state and psychiatric state. Remains motivation for manic / psychotic symptoms.

## 2020-07-27 NOTE — CONSULT NOTE ADULT - SUBJECTIVE AND OBJECTIVE BOX
Patient is a 79y old  Female who presents with a chief complaint of Chest Pain (2020 13:36)      HPI:  80 y/o female with a PMHx of HTN, HLD, chronic back pain, gall stones, osteoporosis, spinal stenosis, closed compression fracture of second lumbar vertebra, [facility] c/o intermittent chest discomfort that began this morning. Pt reports earlier today she had mild SOB, diaphoresis, and nausea this morning. Pt was seen at [facility] yesterday c/o vomiting, paranoia, and delusions, Dx with vomiting, depression, and hyponatremia, and discharged back to facility the same day. Pt notes increased stress in her life with family issues, "what is going on in this world", and her daughters placing her in an assisted living facility against her will. Pt states she chose to come to the ED today because of increased stress and fear of having a heart attack. Denies SI, hallucinations, and does not have thought of hurting herself because she "has too much to live for." Pt seems v anxious and attributes high BP to anxiety. PCP: Dr. Nathan Quezada   Cardiology. 79 year old female past history of htn and hld spinal stenosis admitted with acute hyperventilation, diaphoresis nausea chest discomfort. seemed to resolve after an hour or two but she is not sure. bp high on admission. no prior history of mi or known cad. not very ambulatory due to spinal stenosis.     PMHx:Lumbago, LSpinal stenosis, HLD, HTN, HLD,Osteoporosis,     PSHx: MARIA C, lumbar nerve ablation,     Family Hx:Father  at age 62 of MI, Mother  of old age at 91.    Social Hx.: not smoking, no alcohol use (2020 13:11)      PAST MEDICAL & SURGICAL HISTORY:  Gall stones  Osteoporosis  Spinal stenosis  Spinal stenosis of lumbar region, unspecified whether neurogenic claudication present  Hypertension, unspecified type  Hyperlipidemia, unspecified hyperlipidemia type  Closed compression fracture of second lumbar vertebra, sequela  Chronic back pain, unspecified back location, unspecified back pain laterality  H/O prior ablation treatment: lumbar area  Status post epidural steroid injection: lumbar      PREVIOUS DIAGNOSTIC TESTING:      ECHO  FINDINGS:    STRESS  FINDINGS:    CATHETERIZATION  FINDINGS:    MEDICATIONS  (STANDING):  ALPRAZolam 0.5 milliGRAM(s) Oral two times a day  amLODIPine   Tablet 2.5 milliGRAM(s) Oral daily  atorvastatin 40 milliGRAM(s) Oral at bedtime  gabapentin 100 milliGRAM(s) Oral at bedtime  heparin   Injectable 5000 Unit(s) SubCutaneous every 12 hours  metoprolol tartrate 25 milliGRAM(s) Oral two times a day  mirtazapine 7.5 milliGRAM(s) Oral at bedtime  sertraline 150 milliGRAM(s) Oral daily    MEDICATIONS  (PRN):  acetaminophen   Tablet .. 650 milliGRAM(s) Oral every 6 hours PRN Mild Pain (1 - 3)      FAMILY HISTORY:  No pertinent family history in first degree relatives      SOCIAL HISTORY:  ***    REVIEW OF SYSTEM:  Pertinent items are noted in HPI.  Constitutional  Eyes:    Ears, nose, mouth,   throat, and face:    Neck:   Respiratory:   and wheezing  Cardiovascular:    Gastrointestinal:  Genitourinary:     Hematologic/lymphatic:   Musculoskeletal:   Neurological:   Behavioral/Psych:        Vital Signs Last 24 Hrs  T(C): 36.9 (2020 17:05), Max: 36.9 (2020 17:05)  T(F): 98.4 (2020 17:05), Max: 98.4 (2020 17:05)  HR: 67 (2020 17:05) (67 - 67)  BP: 155/59 (2020 17:05) (155/59 - 162/67)  BP(mean): --  RR: 17 (2020 17:05) (17 - 18)  SpO2: 99% (2020 17:05) (95% - 99%)    I&O's Summary    PHYSICAL EXAM  General Appearance: cooperative, no acute distress,   HEENT: PERRL, conjunctiva clear, EOM's intact, non injected pharynx, no exudate    Neck: Supple, , no adenopathy, thyroid: not enlarged, no carotid bruit or JVD  Back: Symmetric, no  tenderness,no soft tissue tenderness  Lungs: Clear to auscultation bilaterally,no adventitious breath sounds, normal   expiratory phase  Heart: Regular rate and rhythm, S1, S2 normal, no murmur,   Abdomen: Soft, non-tender, bowel sounds active , no hepatosplenomegaly  Extremities: no cyanosis or edema, no joint swelling  Skin: Skin color, texture normal, no rashes   Neurologic: Alert and oriented X3 , cranial nerves intact, sensory and motor normal,        INTERPRETATION OF TELEMETRY:    ECG:        LABS:                          10.6   9.15  )-----------( 299      ( 2020 08:22 )             32.2     07-    137  |  103  |  15  ----------------------------<  101<H>  3.9   |  28  |  1.08    Ca    9.4      2020 08:22  Mg     2.1         TPro  7.7  /  Alb  4.0  /  TBili  0.3  /  DBili  x   /  AST  23  /  ALT  34  /  AlkPhos  76  07-    CARDIAC MARKERS ( 2020 13:41 )  0.045 ng/mL / x     / x     / x     / x      CARDIAC MARKERS ( 2020 10:56 )  0.039 ng/mL / x     / x     / x     / x              PT/INR - ( 2020 10:56 )   PT: 12.2 sec;   INR: 1.05 ratio         PTT - ( 2020 10:56 )  PTT:27.9 sec  Urinalysis Basic - ( 2020 11:55 )    Color: Yellow / Appearance: Clear / S.005 / pH: x  Gluc: x / Ketone: Negative  / Bili: Negative / Urobili: Negative mg/dL   Blood: x / Protein: 30 mg/dL / Nitrite: Negative   Leuk Esterase: Trace / RBC: 3-5 /HPF / WBC 0-2   Sq Epi: x / Non Sq Epi: Occasional / Bacteria: Occasional            RADIOLOGY & ADDITIONAL STUDIES:    IMPRESSION:    PLAN:

## 2020-07-27 NOTE — PROGRESS NOTE ADULT - SUBJECTIVE AND OBJECTIVE BOX
CHIEF COMPLAINT/DIAGNOSIS:    HPI:  78 y/o female with a PMHx of HTN, HLD, chronic back pain, gall stones, osteoporosis, spinal stenosis, closed compression fracture of second lumbar vertebra, anxiety presents to the ED BIBEMS from [place] assisted living facility c/o intermittent chest discomfort. Pt recently seen at [facility] on 7/24 w/ c/o vomiting, paranoia, and delusions.  ~~ Pt notes increased stress in her life with family issues, "what is going on in this world", and her daughters placing her in an assisted living facility against her will. Pt states she chose to come to the ED today because of increased stress and fear of having a heart attack. Denies SI, hallucinations, and does not have thought of hurting herself because she "has too much to live for." Pt seems very anxious and attributes high BP to anxiety.     SUBJECTIVE:  7/27 - patient very anxious, states "big brother is watching" at the [place]. worried about the current world situation. also stated [place] is "ripping off the elderly community". D/w daughter who endorses worsening paranoia and anxiety over past few months.     REVIEW OF SYSTEMS:  All other review of systems is negative unless indicated above    PHYSICAL EXAM:  Constitutional: Awake and alert, well-developed, anxious   HEENT: Normal Hearing, MMM  Neck: Soft and supple, No LAD, No JVD  Respiratory: Breath sounds are clear bilaterally, No wheezing, rales or rhonchi  Cardiovascular: S1 and S2, regular rate and rhythm, no Murmurs, gallops or rubs  Gastrointestinal: Bowel Sounds present, soft, nontender, nondistended, no guarding, no rebound  Extremities: No peripheral edema  Vascular: 2+ peripheral pulses  Neurological: A/O x 3, no focal deficits  Musculoskeletal: 5/5 strength b/l upper and lower extremities  Skin: No rashes    Vital Signs Last 24 Hrs  T(C): 36.7 (27 Jul 2020 08:16), Max: 36.9 (26 Jul 2020 14:40)  T(F): 98 (27 Jul 2020 08:16), Max: 98.5 (26 Jul 2020 14:40)  HR: 67 (27 Jul 2020 08:16) (67 - 79)  BP: 162/67 (27 Jul 2020 08:16) (162/67 - 169/56)  BP(mean): --  RR: 18 (27 Jul 2020 08:16) (16 - 18)  SpO2: 95% (27 Jul 2020 08:16) (95% - 97%)    LABS: All Labs Reviewed:                        10.6   9.15  )-----------( 299      ( 27 Jul 2020 08:22 )             32.2     07-27    137  |  103  |  15  ----------------------------<  101<H>  3.9   |  28  |  1.08    Ca    9.4      27 Jul 2020 08:22  Mg     2.1     07-27    TPro  7.7  /  Alb  4.0  /  TBili  0.3  /  DBili  x   /  AST  23  /  ALT  34  /  AlkPhos  76  07-26    PT/INR - ( 26 Jul 2020 10:56 )   PT: 12.2 sec;   INR: 1.05 ratio         PTT - ( 26 Jul 2020 10:56 )  PTT:27.9 sec  CARDIAC MARKERS ( 26 Jul 2020 13:41 )  0.045 ng/mL / x     / x     / x     / x      CARDIAC MARKERS ( 26 Jul 2020 10:56 )  0.039 ng/mL / x     / x     / x     / x        RADIOLOGY:  < from: CT Head No Cont (07.26.20 @ 12:16) >  Impression: No acute intracranial hemorrhage, mass effect, or acute territorial infarction. If symptoms persist, MR imaging is recommended.  < end of copied text >    MEDICATIONS:  MEDICATIONS  (STANDING):  ALPRAZolam 0.5 milliGRAM(s) Oral two times a day  atorvastatin 40 milliGRAM(s) Oral at bedtime  gabapentin 100 milliGRAM(s) Oral at bedtime  heparin   Injectable 5000 Unit(s) SubCutaneous every 12 hours  metoprolol tartrate 25 milliGRAM(s) Oral two times a day  mirtazapine 7.5 milliGRAM(s) Oral at bedtime  sertraline 150 milliGRAM(s) Oral daily    TELEMETRY REVIEW:  7/27 - SR 80s.    ASSESSMENT AND PLAN:      1) Chest Pain, r/o ACS  Likely due to underlying uncontrolled anxiety / paranoia - possible psychosis   - recently evaluated on 7/24 by our behavioral health team will adjust meds based on that recommendation. Psych consulted  - Cont. Zoloft 150mg daily, Xanax 0.5 BID    - Start Remeron at bedtime 7.5mg, d/c valium  - ECG - sinus tach w/ PACs, troponins negative, no active c/o of chest pain  - UA / CXR/ CTH negative  - Cont. statin   - cardio consulted     2) Uncontrolled HTN  - likely worsened by underlying anxiety   - Cont. BB. Monitor  - add low dose Norvasc    - cardio consulted     3) DVT PPX  - SQH    D/w daughter (Marielle) - all questions answered. 7/27 CHIEF COMPLAINT/DIAGNOSIS: Chest Discomfort     HPI:  80 y/o female with a PMHx of HTN, HLD, chronic back pain, gall stones, osteoporosis, spinal stenosis, closed compression fracture of second lumbar vertebra, anxiety presents to the ED BIBEMS from [facility] assisted living facility c/o intermittent chest discomfort. Pt recently seen at [facility] on 7/24 w/ c/o vomiting, paranoia, and delusions.  ~~ Pt notes increased stress in her life with family issues, "what is going on in this world", and her daughters placing her in an assisted living facility against her will. Pt states she chose to come to the ED today because of increased stress and fear of having a heart attack. Denies SI, hallucinations, and does not have thought of hurting herself because she "has too much to live for." Pt seems very anxious and attributes high BP to anxiety.     SUBJECTIVE:  7/27 - patient very anxious, states "big brother is watching" at the [place]. worried about the current world situation. also stated [place] is "ripping off the elderly community". D/w daughter who endorses worsening paranoia and anxiety over past few months.     REVIEW OF SYSTEMS:  All other review of systems is negative unless indicated above    PHYSICAL EXAM:  Constitutional: Awake and alert, well-developed, anxious   HEENT: Normal Hearing, MMM  Neck: Soft and supple, No LAD, No JVD  Respiratory: Breath sounds are clear bilaterally, No wheezing, rales or rhonchi  Cardiovascular: S1 and S2, regular rate and rhythm, no Murmurs, gallops or rubs  Gastrointestinal: Bowel Sounds present, soft, nontender, nondistended, no guarding, no rebound  Extremities: No peripheral edema  Vascular: 2+ peripheral pulses  Neurological: A/O x 3, no focal deficits  Musculoskeletal: 5/5 strength b/l upper and lower extremities  Skin: No rashes    Vital Signs Last 24 Hrs  T(C): 36.7 (27 Jul 2020 08:16), Max: 36.9 (26 Jul 2020 14:40)  T(F): 98 (27 Jul 2020 08:16), Max: 98.5 (26 Jul 2020 14:40)  HR: 67 (27 Jul 2020 08:16) (67 - 79)  BP: 162/67 (27 Jul 2020 08:16) (162/67 - 169/56)  BP(mean): --  RR: 18 (27 Jul 2020 08:16) (16 - 18)  SpO2: 95% (27 Jul 2020 08:16) (95% - 97%)    LABS: All Labs Reviewed:                        10.6   9.15  )-----------( 299      ( 27 Jul 2020 08:22 )             32.2     07-27    137  |  103  |  15  ----------------------------<  101<H>  3.9   |  28  |  1.08    Ca    9.4      27 Jul 2020 08:22  Mg     2.1     07-27    TPro  7.7  /  Alb  4.0  /  TBili  0.3  /  DBili  x   /  AST  23  /  ALT  34  /  AlkPhos  76  07-26    PT/INR - ( 26 Jul 2020 10:56 )   PT: 12.2 sec;   INR: 1.05 ratio         PTT - ( 26 Jul 2020 10:56 )  PTT:27.9 sec  CARDIAC MARKERS ( 26 Jul 2020 13:41 )  0.045 ng/mL / x     / x     / x     / x      CARDIAC MARKERS ( 26 Jul 2020 10:56 )  0.039 ng/mL / x     / x     / x     / x        RADIOLOGY:  < from: CT Head No Cont (07.26.20 @ 12:16) >  Impression: No acute intracranial hemorrhage, mass effect, or acute territorial infarction. If symptoms persist, MR imaging is recommended.  < end of copied text >    MEDICATIONS:  MEDICATIONS  (STANDING):  ALPRAZolam 0.5 milliGRAM(s) Oral two times a day  atorvastatin 40 milliGRAM(s) Oral at bedtime  gabapentin 100 milliGRAM(s) Oral at bedtime  heparin   Injectable 5000 Unit(s) SubCutaneous every 12 hours  metoprolol tartrate 25 milliGRAM(s) Oral two times a day  mirtazapine 7.5 milliGRAM(s) Oral at bedtime  sertraline 150 milliGRAM(s) Oral daily    TELEMETRY REVIEW:  7/27 - SR 80s.    ASSESSMENT AND PLAN:      1) Chest Pain, r/o ACS  Likely due to underlying uncontrolled anxiety / paranoia - possible psychosis   - recently evaluated on 7/24 by our behavioral health team will adjust meds based on that recommendation. Psych consulted  - Cont. Zoloft 150mg daily, Xanax 0.5 BID    - Start Remeron at bedtime 7.5mg, d/c valium  - ECG - sinus tach w/ PACs, troponins negative, no active c/o of chest pain  - UA / CXR/ CTH negative  - Cont. statin   - cardio consulted     2) Uncontrolled HTN  - likely worsened by underlying anxiety   - Cont. BB. Monitor  - add low dose Norvasc    - cardio consulted     3) DVT PPX  - SQH    D/w daughter (Marielle) - all questions answered. 7/27

## 2020-07-27 NOTE — BEHAVIORAL HEALTH ASSESSMENT NOTE - OTHER
[place] did not ambulate - in bed Records Checked: [facility], [facility], [facility], [facility], [facility], [facility], [facility], [facility], [facility], [facility], [facility], [facility], [facility], [facility], [facility], [facility], [facility], [facility], [facility], [facility], [facility], Social Media (For example - Facebook, [employer], [employer]), Web search, [employer] [employer] UTI; losing function; selling of house

## 2020-07-28 DIAGNOSIS — F05 DELIRIUM DUE TO KNOWN PHYSIOLOGICAL CONDITION: ICD-10-CM

## 2020-07-28 LAB
ANION GAP SERPL CALC-SCNC: 9 MMOL/L — SIGNIFICANT CHANGE UP (ref 5–17)
BUN SERPL-MCNC: 24 MG/DL — HIGH (ref 7–23)
CALCIUM SERPL-MCNC: 9.8 MG/DL — SIGNIFICANT CHANGE UP (ref 8.5–10.1)
CHLORIDE SERPL-SCNC: 106 MMOL/L — SIGNIFICANT CHANGE UP (ref 96–108)
CO2 SERPL-SCNC: 25 MMOL/L — SIGNIFICANT CHANGE UP (ref 22–31)
CREAT SERPL-MCNC: 1.07 MG/DL — SIGNIFICANT CHANGE UP (ref 0.5–1.3)
GLUCOSE SERPL-MCNC: 90 MG/DL — SIGNIFICANT CHANGE UP (ref 70–99)
HCT VFR BLD CALC: 33.6 % — LOW (ref 34.5–45)
HGB BLD-MCNC: 10.9 G/DL — LOW (ref 11.5–15.5)
MAGNESIUM SERPL-MCNC: 2.3 MG/DL — SIGNIFICANT CHANGE UP (ref 1.6–2.6)
MCHC RBC-ENTMCNC: 31.7 PG — SIGNIFICANT CHANGE UP (ref 27–34)
MCHC RBC-ENTMCNC: 32.4 GM/DL — SIGNIFICANT CHANGE UP (ref 32–36)
MCV RBC AUTO: 97.7 FL — SIGNIFICANT CHANGE UP (ref 80–100)
PLATELET # BLD AUTO: 306 K/UL — SIGNIFICANT CHANGE UP (ref 150–400)
POTASSIUM SERPL-MCNC: 4.2 MMOL/L — SIGNIFICANT CHANGE UP (ref 3.5–5.3)
POTASSIUM SERPL-SCNC: 4.2 MMOL/L — SIGNIFICANT CHANGE UP (ref 3.5–5.3)
RBC # BLD: 3.44 M/UL — LOW (ref 3.8–5.2)
RBC # FLD: 12.6 % — SIGNIFICANT CHANGE UP (ref 10.3–14.5)
SODIUM SERPL-SCNC: 140 MMOL/L — SIGNIFICANT CHANGE UP (ref 135–145)
WBC # BLD: 10.66 K/UL — HIGH (ref 3.8–10.5)
WBC # FLD AUTO: 10.66 K/UL — HIGH (ref 3.8–10.5)

## 2020-07-28 PROCEDURE — 99232 SBSQ HOSP IP/OBS MODERATE 35: CPT

## 2020-07-28 RX ORDER — QUETIAPINE FUMARATE 200 MG/1
25 TABLET, FILM COATED ORAL AT BEDTIME
Refills: 0 | Status: DISCONTINUED | OUTPATIENT
Start: 2020-07-28 | End: 2020-07-30

## 2020-07-28 RX ORDER — QUETIAPINE FUMARATE 200 MG/1
12.5 TABLET, FILM COATED ORAL DAILY
Refills: 0 | Status: DISCONTINUED | OUTPATIENT
Start: 2020-07-28 | End: 2020-07-29

## 2020-07-28 RX ORDER — CEFUROXIME AXETIL 250 MG
250 TABLET ORAL EVERY 12 HOURS
Refills: 0 | Status: DISCONTINUED | OUTPATIENT
Start: 2020-07-28 | End: 2020-07-30

## 2020-07-28 RX ORDER — ALPRAZOLAM 0.25 MG
0.5 TABLET ORAL DAILY
Refills: 0 | Status: DISCONTINUED | OUTPATIENT
Start: 2020-07-29 | End: 2020-07-30

## 2020-07-28 RX ADMIN — QUETIAPINE FUMARATE 25 MILLIGRAM(S): 200 TABLET, FILM COATED ORAL at 20:37

## 2020-07-28 RX ADMIN — Medication 25 MILLIGRAM(S): at 09:58

## 2020-07-28 RX ADMIN — Medication 250 MILLIGRAM(S): at 11:28

## 2020-07-28 RX ADMIN — Medication 650 MILLIGRAM(S): at 18:22

## 2020-07-28 RX ADMIN — GABAPENTIN 100 MILLIGRAM(S): 400 CAPSULE ORAL at 20:37

## 2020-07-28 RX ADMIN — Medication 0.5 MILLIGRAM(S): at 09:58

## 2020-07-28 RX ADMIN — Medication 250 MILLIGRAM(S): at 21:05

## 2020-07-28 RX ADMIN — HEPARIN SODIUM 5000 UNIT(S): 5000 INJECTION INTRAVENOUS; SUBCUTANEOUS at 17:12

## 2020-07-28 RX ADMIN — Medication 25 MILLIGRAM(S): at 20:37

## 2020-07-28 RX ADMIN — SERTRALINE 150 MILLIGRAM(S): 25 TABLET, FILM COATED ORAL at 09:59

## 2020-07-28 RX ADMIN — AMLODIPINE BESYLATE 2.5 MILLIGRAM(S): 2.5 TABLET ORAL at 09:58

## 2020-07-28 RX ADMIN — MIRTAZAPINE 7.5 MILLIGRAM(S): 45 TABLET, ORALLY DISINTEGRATING ORAL at 20:37

## 2020-07-28 RX ADMIN — HEPARIN SODIUM 5000 UNIT(S): 5000 INJECTION INTRAVENOUS; SUBCUTANEOUS at 05:46

## 2020-07-28 RX ADMIN — ATORVASTATIN CALCIUM 40 MILLIGRAM(S): 80 TABLET, FILM COATED ORAL at 20:37

## 2020-07-28 NOTE — PROGRESS NOTE BEHAVIORAL HEALTH - NSBHCONSULTMEDS_PSY_A_CORE FT
PLAN  1. Zoloft 150 mg daily  2. Remeron 7.5 mg at bedtime  3. Xanax 0.5 mg once daily (taper off)  4. Seroquel 12.5 mg daily and 25 mg at bedtime PLAN  1. Zoloft 150 mg daily  2. Remeron 7.5 mg at bedtime  3. Xanax 0.5 mg once daily (taper off - and discontinue)  4. Seroquel 12.5 mg daily and 25 mg at bedtime

## 2020-07-28 NOTE — PROGRESS NOTE ADULT - ASSESSMENT
1. chest pain likely due to anxiety. no evidence of ACS  based on troponin and ecg.  2. htn. improved today  3 hld    Plan; cont metoprolol and amlodipine  further cardiac work up not indicated at this time

## 2020-07-28 NOTE — PROGRESS NOTE ADULT - SUBJECTIVE AND OBJECTIVE BOX
CHIEF COMPLAINT/DIAGNOSIS: Chest Discomfort     HPI:  80 y/o female with a PMHx of HTN, HLD, chronic back pain, gall stones, osteoporosis, spinal stenosis, closed compression fracture of second lumbar vertebra, anxiety presents to the ED BIBEMS from [place] assisted living facility c/o intermittent chest discomfort. Pt recently seen at [facility] on 7/24 w/ c/o vomiting, paranoia, and delusions.  ~~ Pt notes increased stress in her life with family issues, "what is going on in this world", and her daughters placing her in an assisted living facility against her will. Pt states she chose to come to the ED today because of increased stress and fear of having a heart attack. Denies SI, hallucinations, and does not have thought of hurting herself because she "has too much to live for." Pt seems very anxious and attributes high BP to anxiety.     SUBJECTIVE:  7/27 - patient very anxious, states "big brother is watching" at the [place]. worried about the current world situation. also stated [place] is "ripping off the elderly community". D/w daughter who endorses worsening paranoia and anxiety over past few months.   7/28 - patient w/ + paranoia and anxiety     REVIEW OF SYSTEMS:  All other review of systems is negative unless indicated above.     PHYSICAL EXAM:  Constitutional: Awake and alert, well-developed, anxious   HEENT: Normal Hearing, MMM  Neck: Soft and supple, No LAD, No JVD  Respiratory: Breath sounds are clear bilaterally, No wheezing, rales or rhonchi  Cardiovascular: S1 and S2, regular rate and rhythm, no Murmurs, gallops or rubs  Gastrointestinal: Bowel Sounds present, soft, nontender, nondistended, no guarding, no rebound  Extremities: No peripheral edema  Vascular: 2+ peripheral pulses  Neurological: A/O x 3, no focal deficits  Musculoskeletal: 5/5 strength b/l upper and lower extremities  Skin: No rashes    Vital Signs Last 24 Hrs  T(C): 36.7 (28 Jul 2020 09:15), Max: 36.9 (27 Jul 2020 17:05)  T(F): 98.1 (28 Jul 2020 09:15), Max: 98.4 (27 Jul 2020 17:05)  HR: 72 (28 Jul 2020 09:15) (67 - 75)  BP: 179/82 (28 Jul 2020 09:15) (131/60 - 179/82)  BP(mean): --  RR: 18 (28 Jul 2020 09:15) (17 - 18)  SpO2: 96% (28 Jul 2020 09:15) (96% - 99%)    LABS: All Labs Reviewed:                        10.9   10.66 )-----------( 306      ( 28 Jul 2020 07:56 )             33.6     07-28    140  |  106  |  24<H>  ----------------------------<  90  4.2   |  25  |  1.07    Ca    9.8      28 Jul 2020 07:56  Mg     2.3     07-28    RADIOLOGY:  < from: CT Head No Cont (07.26.20 @ 12:16) >  Impression: No acute intracranial hemorrhage, mass effect, or acute territorial infarction. If symptoms persist, MR imaging is recommended.  < end of copied text >    MEDICATIONS  (STANDING):  ALPRAZolam 0.5 milliGRAM(s) Oral two times a day  amLODIPine   Tablet 2.5 milliGRAM(s) Oral daily  atorvastatin 40 milliGRAM(s) Oral at bedtime  cefuroxime   Tablet 250 milliGRAM(s) Oral every 12 hours  gabapentin 100 milliGRAM(s) Oral at bedtime  heparin   Injectable 5000 Unit(s) SubCutaneous every 12 hours  metoprolol tartrate 25 milliGRAM(s) Oral two times a day  mirtazapine 7.5 milliGRAM(s) Oral at bedtime  sertraline 150 milliGRAM(s) Oral daily    MEDICATIONS  (PRN):  acetaminophen   Tablet .. 650 milliGRAM(s) Oral every 6 hours PRN Mild Pain (1 - 3)    TELEMETRY REVIEW:  7/27 -  70-80s    ASSESSMENT AND PLAN:      1) Chest Pain, r/o ACS  Likely due to underlying uncontrolled anxiety / paranoia - possible psychosis   - Cont. Zoloft 150mg daily, Xanax 0.5 BID    - Cont. Remeron at bedtime 7.5mg, d/c valium  - ECG - sinus tach w/ PACs, troponins negative, no active c/o of chest pain  - UA / CXR/ CTH negative  - Cont. statin   - Cardio f/u appreciated    - Psych f/u appreciated   7/28 - patient w/ consistent paranoia, start Seroquel 12.5mg daily      2) Uncontrolled HTN  - likely worsened by underlying anxiety   - Cont. BB. Monitor  - add low dose Norvasc    - cardio consulted     3) DVT PPX  - SQH    D/w daughter (Marielle) - all questions answered. 7/28. CHIEF COMPLAINT/DIAGNOSIS: Chest Discomfort     HPI:  78 y/o female with a PMHx of HTN, HLD, chronic back pain, gall stones, osteoporosis, spinal stenosis, closed compression fracture of second lumbar vertebra, anxiety presents to the ED BIBEMS from [place] assisted living facility c/o intermittent chest discomfort. Pt recently seen at [facility] on 7/24 w/ c/o vomiting, paranoia, and delusions.  ~~ Pt notes increased stress in her life with family issues, "what is going on in this world", and her daughters placing her in an assisted living facility against her will. Pt states she chose to come to the ED today because of increased stress and fear of having a heart attack. Denies SI, hallucinations, and does not have thought of hurting herself because she "has too much to live for." Pt seems very anxious and attributes high BP to anxiety.     SUBJECTIVE:  7/27 - patient very anxious, states "big brother is watching" at the [place]. worried about the current world situation. also stated [place] is "ripping off the elderly community". D/w daughter who endorses worsening paranoia and anxiety over past few months.   7/28 - patient w/ + paranoia and anxiety     REVIEW OF SYSTEMS:  All other review of systems is negative unless indicated above.     PHYSICAL EXAM:  Constitutional: Awake and alert, well-developed, anxious   HEENT: Normal Hearing, MMM  Neck: Soft and supple, No LAD, No JVD  Respiratory: Breath sounds are clear bilaterally, No wheezing, rales or rhonchi  Cardiovascular: S1 and S2, regular rate and rhythm, no Murmurs, gallops or rubs  Gastrointestinal: Bowel Sounds present, soft, nontender, nondistended, no guarding, no rebound  Extremities: No peripheral edema  Vascular: 2+ peripheral pulses  Neurological: A/O x 3, no focal deficits  Musculoskeletal: 5/5 strength b/l upper and lower extremities  Skin: No rashes    Vital Signs Last 24 Hrs  T(C): 36.7 (28 Jul 2020 09:15), Max: 36.9 (27 Jul 2020 17:05)  T(F): 98.1 (28 Jul 2020 09:15), Max: 98.4 (27 Jul 2020 17:05)  HR: 72 (28 Jul 2020 09:15) (67 - 75)  BP: 179/82 (28 Jul 2020 09:15) (131/60 - 179/82)  BP(mean): --  RR: 18 (28 Jul 2020 09:15) (17 - 18)  SpO2: 96% (28 Jul 2020 09:15) (96% - 99%)    LABS: All Labs Reviewed:                        10.9   10.66 )-----------( 306      ( 28 Jul 2020 07:56 )             33.6     07-28    140  |  106  |  24<H>  ----------------------------<  90  4.2   |  25  |  1.07    Ca    9.8      28 Jul 2020 07:56  Mg     2.3     07-28    RADIOLOGY:  < from: CT Head No Cont (07.26.20 @ 12:16) >  Impression: No acute intracranial hemorrhage, mass effect, or acute territorial infarction. If symptoms persist, MR imaging is recommended.  < end of copied text >    MEDICATIONS  (STANDING):  ALPRAZolam 0.5 milliGRAM(s) Oral two times a day  amLODIPine   Tablet 2.5 milliGRAM(s) Oral daily  atorvastatin 40 milliGRAM(s) Oral at bedtime  cefuroxime   Tablet 250 milliGRAM(s) Oral every 12 hours  gabapentin 100 milliGRAM(s) Oral at bedtime  heparin   Injectable 5000 Unit(s) SubCutaneous every 12 hours  metoprolol tartrate 25 milliGRAM(s) Oral two times a day  mirtazapine 7.5 milliGRAM(s) Oral at bedtime  sertraline 150 milliGRAM(s) Oral daily    MEDICATIONS  (PRN):  acetaminophen   Tablet .. 650 milliGRAM(s) Oral every 6 hours PRN Mild Pain (1 - 3)    TELEMETRY REVIEW:  7/27 -  70-80s    ASSESSMENT AND PLAN:      1) Chest Pain, atypical. ruled out ACS  Likely due to underlying uncontrolled anxiety / paranoia - possible psychosis   - Cont. Zoloft 150mg daily, Xanax 0.5 BID    - Cont. Remeron at bedtime 7.5mg, d/c valium  - ECG - sinus tach w/ PACs, troponins negative, no active c/o of chest pain  - UA / CXR/ CTH negative  - Cont. statin   - Cardio f/u appreciated    - Psych f/u appreciated   7/28 - patient w/ consistent paranoia, start Seroquel 12.5mg daily      2) Uncontrolled HTN  - likely worsened by underlying anxiety   - Cont. BB. Monitor  - add low dose Norvasc    - cardio consulted     3) DVT PPX  - SQH    D/w daughter (Marielle) - all questions answered. 7/28.      dispo: med floor. possible d/c to [facility] tomorrow

## 2020-07-28 NOTE — PROGRESS NOTE ADULT - SUBJECTIVE AND OBJECTIVE BOX
Patient is a 79y old  Female who presents with a chief complaint of Ataxia, L Spinal stenosis, r/o NPH (2020 17:35)      HPI:  78 y/o female with a PMHx of HTN, HLD, chronic back pain, gall stones, osteoporosis, spinal stenosis, closed compression fracture of second lumbar vertebra, presents to the ED BIBEMS from [facility] living [facility] c/o intermittent chest discomfort that began this morning. Pt reports earlier today she had mild SOB, diaphoresis, and nausea this morning. Pt was seen at [facility] yesterday c/o vomiting, paranoia, and delusions, Dx with vomiting, depression, and hyponatremia, and discharged back to facility the same day. Pt notes increased stress in her life with family issues, "what is going on in this world", and her daughters placing her in an assisted living facility against her will. Pt states she chose to come to the ED today because of increased stress and fear of having a heart attack. Denies SI, hallucinations, and does not have thought of hurting herself because she "has too much to live for." Pt seems v anxious and attributes high BP to anxiety. PCP: Dr. Nathan Quezada   Cardiology. 79 year old female past history of htn and hld spinal stenosis admitted with acute hyperventilation, diaphoresis nausea chest discomfort. seemed to resolve after an hour or two but she is not sure. bp high on admission. no prior history of mi or known cad. not very ambulatory due to spinal stenosis.    no chest pain, less anxious    PMHx:Lumbago, LSpinal stenosis, HLD, HTN, HLD,Osteoporosis,     PSHx: MARIA C, lumbar nerve ablation,     Family Hx:Father  at age 62 of MI, Mother  of old age at 91.    Social Hx.: not smoking, no alcohol use (2020 13:11)      PAST MEDICAL & SURGICAL HISTORY:  Gall stones  Osteoporosis  Spinal stenosis  Spinal stenosis of lumbar region, unspecified whether neurogenic claudication present  Hypertension, unspecified type  Hyperlipidemia, unspecified hyperlipidemia type  Closed compression fracture of second lumbar vertebra, sequela  Chronic back pain, unspecified back location, unspecified back pain laterality  H/O prior ablation treatment: lumbar area  Status post epidural steroid injection: lumbar        MEDICATIONS  (STANDING):  ALPRAZolam 0.5 milliGRAM(s) Oral two times a day  amLODIPine   Tablet 2.5 milliGRAM(s) Oral daily  atorvastatin 40 milliGRAM(s) Oral at bedtime  gabapentin 100 milliGRAM(s) Oral at bedtime  heparin   Injectable 5000 Unit(s) SubCutaneous every 12 hours  metoprolol tartrate 25 milliGRAM(s) Oral two times a day  mirtazapine 7.5 milliGRAM(s) Oral at bedtime  sertraline 150 milliGRAM(s) Oral daily    MEDICATIONS  (PRN):  acetaminophen   Tablet .. 650 milliGRAM(s) Oral every 6 hours PRN Mild Pain (1 - 3)          Vital Signs Last 24 Hrs  T(C): 36.8 (2020 20:01), Max: 36.9 (2020 17:05)  T(F): 98.2 (2020 20:01), Max: 98.4 (2020 17:05)  HR: 75 (2020 20:01) (67 - 75)  BP: 131/60 (2020 20:01) (131/60 - 162/67)  BP(mean): --  RR: 18 (2020 20:01) (17 - 18)  SpO2: 97% (2020 20:01) (95% - 99%)    I&O's Summary      PHYSICAL EXAM  General Appearance: comfortable  HEENT:   Neck:   Back:   Lungs: clear  Heart: [id number]  Abdomen: soft nt  Extremities: no edema  Skin:   Neurologic:       INTERPRETATION OF TELEMETRY:    ECG:        LABS:                          10.6   9.15  )-----------( 299      ( 2020 08:22 )             32.2     07-27    137  |  103  |  15  ----------------------------<  101<H>  3.9   |  28  |  1.08    Ca    9.4      2020 08:22  Mg     2.1     07-27    TPro  7.7  /  Alb  4.0  /  TBili  0.3  /  DBili  x   /  AST  23  /  ALT  34  /  AlkPhos  76  07-26    CARDIAC MARKERS ( 2020 13:41 )  0.045 ng/mL / x     / x     / x     / x      CARDIAC MARKERS ( 2020 10:56 )  0.039 ng/mL / x     / x     / x     / x              PT/INR - ( 2020 10:56 )   PT: 12.2 sec;   INR: 1.05 ratio         PTT - ( 2020 10:56 )  PTT:27.9 sec  Urinalysis Basic - ( 2020 11:55 )    Color: Yellow / Appearance: Clear / S.005 / pH: x  Gluc: x / Ketone: Negative  / Bili: Negative / Urobili: Negative mg/dL   Blood: x / Protein: 30 mg/dL / Nitrite: Negative   Leuk Esterase: Trace / RBC: 3-5 /HPF / WBC 0-2   Sq Epi: x / Non Sq Epi: Occasional / Bacteria: Occasional            RADIOLOGY & ADDITIONAL STUDIES:

## 2020-07-29 ENCOUNTER — TRANSCRIPTION ENCOUNTER (OUTPATIENT)
Age: 80
End: 2020-07-29

## 2020-07-29 ENCOUNTER — APPOINTMENT (OUTPATIENT)
Dept: GERIATRICS | Facility: CLINIC | Age: 80
End: 2020-07-29

## 2020-07-29 PROCEDURE — 99239 HOSP IP/OBS DSCHRG MGMT >30: CPT

## 2020-07-29 PROCEDURE — 99232 SBSQ HOSP IP/OBS MODERATE 35: CPT

## 2020-07-29 RX ORDER — MIRTAZAPINE 45 MG/1
1 TABLET, ORALLY DISINTEGRATING ORAL
Qty: 30 | Refills: 0
Start: 2020-07-29 | End: 2020-08-27

## 2020-07-29 RX ORDER — ATORVASTATIN CALCIUM 80 MG/1
1 TABLET, FILM COATED ORAL
Qty: 0 | Refills: 0 | DISCHARGE

## 2020-07-29 RX ORDER — GABAPENTIN 400 MG/1
1 CAPSULE ORAL
Qty: 0 | Refills: 0 | DISCHARGE
Start: 2020-07-29

## 2020-07-29 RX ORDER — MULTIVIT-MIN/FERROUS GLUCONATE 9 MG/15 ML
1 LIQUID (ML) ORAL
Qty: 0 | Refills: 0 | DISCHARGE

## 2020-07-29 RX ORDER — ATORVASTATIN CALCIUM 80 MG/1
1 TABLET, FILM COATED ORAL
Qty: 0 | Refills: 0 | DISCHARGE
Start: 2020-07-29

## 2020-07-29 RX ORDER — CLOTRIMAZOLE AND BETAMETHASONE DIPROPIONATE 10; .5 MG/G; MG/G
1 CREAM TOPICAL
Qty: 0 | Refills: 0 | DISCHARGE

## 2020-07-29 RX ORDER — ALPRAZOLAM 0.25 MG
1 TABLET ORAL
Qty: 0 | Refills: 0 | DISCHARGE

## 2020-07-29 RX ORDER — QUETIAPINE FUMARATE 200 MG/1
1 TABLET, FILM COATED ORAL
Qty: 30 | Refills: 0
Start: 2020-07-29 | End: 2020-08-27

## 2020-07-29 RX ORDER — LANOLIN ALCOHOL/MO/W.PET/CERES
1 CREAM (GRAM) TOPICAL
Qty: 0 | Refills: 0 | DISCHARGE

## 2020-07-29 RX ORDER — CEFUROXIME AXETIL 250 MG
1 TABLET ORAL
Qty: 2 | Refills: 0
Start: 2020-07-29 | End: 2020-07-29

## 2020-07-29 RX ORDER — CICLOPIROX OLAMINE 7.7 MG/G
1 CREAM TOPICAL
Qty: 0 | Refills: 0 | DISCHARGE

## 2020-07-29 RX ORDER — ALPRAZOLAM 0.25 MG
1 TABLET ORAL
Qty: 0 | Refills: 0 | DISCHARGE
Start: 2020-07-29

## 2020-07-29 RX ORDER — SERTRALINE 25 MG/1
1.5 TABLET, FILM COATED ORAL
Qty: 0 | Refills: 0 | DISCHARGE

## 2020-07-29 RX ORDER — MIRTAZAPINE 45 MG/1
15 TABLET, ORALLY DISINTEGRATING ORAL AT BEDTIME
Refills: 0 | Status: DISCONTINUED | OUTPATIENT
Start: 2020-07-29 | End: 2020-07-30

## 2020-07-29 RX ORDER — DIAZEPAM 5 MG
0.5 TABLET ORAL
Qty: 0 | Refills: 0 | DISCHARGE

## 2020-07-29 RX ORDER — METOPROLOL TARTRATE 50 MG
1 TABLET ORAL
Qty: 0 | Refills: 0 | DISCHARGE

## 2020-07-29 RX ORDER — AMLODIPINE BESYLATE 2.5 MG/1
1 TABLET ORAL
Qty: 30 | Refills: 0
Start: 2020-07-29 | End: 2020-08-27

## 2020-07-29 RX ORDER — SERTRALINE 25 MG/1
3 TABLET, FILM COATED ORAL
Qty: 0 | Refills: 0 | DISCHARGE
Start: 2020-07-29

## 2020-07-29 RX ORDER — QUETIAPINE FUMARATE 200 MG/1
25 TABLET, FILM COATED ORAL DAILY
Refills: 0 | Status: DISCONTINUED | OUTPATIENT
Start: 2020-07-29 | End: 2020-07-30

## 2020-07-29 RX ORDER — DOCUSATE SODIUM 100 MG
3 CAPSULE ORAL
Qty: 0 | Refills: 0 | DISCHARGE

## 2020-07-29 RX ORDER — LACTULOSE 10 G/15ML
30 SOLUTION ORAL
Qty: 0 | Refills: 0 | DISCHARGE

## 2020-07-29 RX ORDER — METOPROLOL TARTRATE 50 MG
1 TABLET ORAL
Qty: 0 | Refills: 0 | DISCHARGE
Start: 2020-07-29

## 2020-07-29 RX ORDER — GABAPENTIN 400 MG/1
1 CAPSULE ORAL
Qty: 0 | Refills: 0 | DISCHARGE

## 2020-07-29 RX ADMIN — Medication 250 MILLIGRAM(S): at 09:52

## 2020-07-29 RX ADMIN — Medication 650 MILLIGRAM(S): at 21:02

## 2020-07-29 RX ADMIN — HEPARIN SODIUM 5000 UNIT(S): 5000 INJECTION INTRAVENOUS; SUBCUTANEOUS at 17:07

## 2020-07-29 RX ADMIN — Medication 25 MILLIGRAM(S): at 09:52

## 2020-07-29 RX ADMIN — AMLODIPINE BESYLATE 2.5 MILLIGRAM(S): 2.5 TABLET ORAL at 09:52

## 2020-07-29 RX ADMIN — Medication 650 MILLIGRAM(S): at 05:19

## 2020-07-29 RX ADMIN — Medication 0.5 MILLIGRAM(S): at 10:22

## 2020-07-29 RX ADMIN — Medication 650 MILLIGRAM(S): at 05:17

## 2020-07-29 RX ADMIN — GABAPENTIN 100 MILLIGRAM(S): 400 CAPSULE ORAL at 21:02

## 2020-07-29 RX ADMIN — HEPARIN SODIUM 5000 UNIT(S): 5000 INJECTION INTRAVENOUS; SUBCUTANEOUS at 05:17

## 2020-07-29 RX ADMIN — SERTRALINE 150 MILLIGRAM(S): 25 TABLET, FILM COATED ORAL at 09:52

## 2020-07-29 RX ADMIN — Medication 250 MILLIGRAM(S): at 21:01

## 2020-07-29 RX ADMIN — QUETIAPINE FUMARATE 25 MILLIGRAM(S): 200 TABLET, FILM COATED ORAL at 21:02

## 2020-07-29 RX ADMIN — MIRTAZAPINE 15 MILLIGRAM(S): 45 TABLET, ORALLY DISINTEGRATING ORAL at 21:01

## 2020-07-29 RX ADMIN — QUETIAPINE FUMARATE 12.5 MILLIGRAM(S): 200 TABLET, FILM COATED ORAL at 09:53

## 2020-07-29 RX ADMIN — Medication 25 MILLIGRAM(S): at 21:02

## 2020-07-29 RX ADMIN — ATORVASTATIN CALCIUM 40 MILLIGRAM(S): 80 TABLET, FILM COATED ORAL at 21:01

## 2020-07-29 NOTE — PHYSICAL THERAPY INITIAL EVALUATION ADULT - GENERAL OBSERVATIONS, REHAB EVAL
pt rec'd recumbent in bed partially rolled toward R side ,propped on pillows,wearing gloves,upper body posture hunched/rounded,head & neck flexed; bottom denture in cup ,pt is upset that she has been forced to stay at [facility] in [place] and has gilmer very isolated since pandemic started

## 2020-07-29 NOTE — DISCHARGE NOTE PROVIDER - ATTENDING COMMENTS
I have personally seen and examined patient today.   I discussed the case with APN and I agree with findings and plan as detailed per note above, which I have amended where appropriate.  d/w pt's daughter on the phone. d/w HOLLIS.

## 2020-07-29 NOTE — PHYSICAL THERAPY INITIAL EVALUATION ADULT - PERTINENT HX OF CURRENT PROBLEM, REHAB EVAL
c/o chest pain,+generalized anxiety,paranoia with agitation,feeling staff at [facility] is conspiring against her ,trying to poison her with meds etc

## 2020-07-29 NOTE — PHYSICAL THERAPY INITIAL EVALUATION ADULT - GAIT DEVIATIONS NOTED, PT EVAL
decreased weight-shifting ability/decreased step length/increased time in double stance/decreased stride length/decreased swing-to-stance ratio/decreased donavon

## 2020-07-29 NOTE — PHYSICAL THERAPY INITIAL EVALUATION ADULT - CRITERIA FOR SKILLED THERAPEUTIC INTERVENTIONS
predicted duration of therapy intervention/functional limitations in following categories/anticipated discharge recommendation/risk reduction/prevention/rehab potential/therapy frequency/impairments found/anticipated equipment needs at discharge

## 2020-07-29 NOTE — PHYSICAL THERAPY INITIAL EVALUATION ADULT - LEVEL OF INDEPENDENCE: SCOOT/BRIDGE, REHAB EVAL
maximum assist (25% patients effort)/moderate assist (50% patients effort)/needed repeated cueing to correct seated alignment ,weight shift P-A,R to L to maintain midline /neutral seated balance

## 2020-07-29 NOTE — DISCHARGE NOTE PROVIDER - NSDCMRMEDTOKEN_GEN_ALL_CORE_FT
ALPRAZolam 0.5 mg oral tablet: 1 tab(s) orally once a day  amLODIPine 2.5 mg oral tablet: 1 tab(s) orally once a day  atorvastatin 40 mg oral tablet: 1 tab(s) orally once a day (at bedtime)  cefuroxime 250 mg oral tablet: 1 tab(s) orally every 12 hours  Certa-Mitchel oral tablet: 1 tab(s) orally once a day  famotidine 20 mg oral tablet: 1 tab(s) orally once a day  fluticasone 50 mcg/inh nasal spray: 2 spray(s) in each nostril once a day (at bedtime)  gabapentin 100 mg oral capsule: 1 cap(s) orally once a day (at bedtime)  Kenalog Dental Paste: Apply to oral ulcers twice daily  Melatonin 10 mg oral capsule: 0.5 tab(s) orally once a day (at bedtime), As Needed Insomnia  metoprolol tartrate 25 mg oral tablet: 1 tab(s) orally 2 times a day  mirtazapine 15 mg oral tablet: 1 tab(s) orally once a day (at bedtime)  QUEtiapine 25 mg oral tablet: 1 tab(s) orally once a day (at bedtime)  QUEtiapine 25 mg oral tablet: 1 tab(s) orally once a day  Senna 8.6 mg oral tablet: 2 tab(s) orally once a day, As Needed - for constipation  sertraline 50 mg oral tablet: 3 tab(s) orally once a day  Toviaz 8 mg oral tablet, extended release: 1 tab(s) orally once a day

## 2020-07-29 NOTE — PHYSICAL THERAPY INITIAL EVALUATION ADULT - MARITAL STATUS
/previously [facility] with  [facility] ,now  /previously resided [facility] but had repeated falls and dtr moved her into [facility] just before pandemic started per patient ;   [facility]  x 18 yrs

## 2020-07-29 NOTE — PHYSICAL THERAPY INITIAL EVALUATION ADULT - PHYSICAL ASSIST/NONPHYSICAL ASSIST: SIT/STAND, REHAB EVAL
1 person assist/verbal cues/nonverbal cues (demo/gestures)/cues to flex LLE and place foot flat in preparation for standing/push off ,seems to favor LLE relying more on RLE likely due to chronic weakness

## 2020-07-29 NOTE — DISCHARGE NOTE PROVIDER - HOSPITAL COURSE
CHIEF COMPLAINT/DIAGNOSIS: Chest Discomfort         HPI:  78 y/o female with a PMHx of HTN, HLD, chronic back pain, gall stones, osteoporosis, spinal stenosis, closed compression fracture of second lumbar vertebra, anxiety presents to the ED BIBEMS from [facility] facility c/o intermittent chest discomfort. Pt recently seen at [facility] on 7/24 w/ c/o vomiting, paranoia, and delusions.    ~~ Pt notes increased stress in her life with family issues, "what is going on in this world", and her daughters placing her in an assisted living facility against her will. Pt states she chose to come to the ED today because of increased stress and fear of having a heart attack. Denies SI, hallucinations, and does not have thought of hurting herself because she "has too much to live for." Pt seems very anxious and attributes high BP to anxiety.         SUBJECTIVE:    7/29 - still w/ paranoia. improving.         REVIEW OF SYSTEMS:    All other review of systems is negative unless indicated above.         PHYSICAL EXAM:    Constitutional: Awake and alert, well-developed, anxious     HEENT: Normal Hearing, MMM    Neck: Soft and supple, No LAD, No JVD    Respiratory: Breath sounds are clear bilaterally, No wheezing, rales or rhonchi    Cardiovascular: S1 and S2, regular rate and rhythm, no Murmurs, gallops or rubs    Gastrointestinal: Bowel Sounds present, soft, nontender, nondistended, no guarding, no rebound    Extremities: No peripheral edema    Vascular: 2+ peripheral pulses    Neurological: A/O x 3, no focal deficits    Musculoskeletal: 5/5 strength b/l upper and lower extremities    Skin: No rashes        Vital Signs: All reviewed     Labs: All reviewed    Radiology: All reviewed    Medications: All Reviewed         ASSESSMENT AND PLAN:            1) Chest Pain, atypical. ruled out ACS    Likely due to underlying uncontrolled anxiety / paranoia - possible psychosis     - Cont. Zoloft 150mg daily, Xanax 0.5 BID-- titrate xanax to daily     - Cont. Remeron at bedtime 7.5mg, d/c valium    - Cont. Seroquel -- up titrate to 25mg daily and at bedtime     - ECG - sinus tach w/ PACs, troponins negative, no active c/o of chest pain    - UA / CXR/ CTH negative    - Cont. statin     - Cardio f/u appreciated      - Psych f/u appreciated         2) Uncontrolled HTN    - likely worsened by underlying anxiety     - Cont. BB. Monitor    - add low dose Norvasc      - cardio consulted         3) DVT PPX    - SQH        f/u psych outpatient and PCP CHIEF COMPLAINT/DIAGNOSIS: Chest Discomfort         HPI:  80 y/o female with a PMHx of HTN, HLD, chronic back pain, gall stones, osteoporosis, spinal stenosis, closed compression fracture of second lumbar vertebra, anxiety presents to the ED BIBEMS from [place] assisted living facility c/o intermittent chest discomfort. Pt recently seen at [facility] on 7/24 w/ c/o vomiting, paranoia, and delusions.    ~~ Pt notes increased stress in her life with family issues, "what is going on in this world", and her daughters placing her in an assisted living facility against her will. Pt states she chose to come to the ED today because of increased stress and fear of having a heart attack. Denies SI, hallucinations, and does not have thought of hurting herself because she "has too much to live for." Pt seems very anxious and attributes high BP to anxiety.         SUBJECTIVE:    7/29 - still w/ paranoia. improving. did not sleep much overnight.         REVIEW OF SYSTEMS:    All other review of systems is negative unless indicated above.         PHYSICAL EXAM:    Constitutional: Awake and alert, well-developed, anxious     HEENT: Normal Hearing, MMM    Neck: Soft and supple, No LAD, No JVD    Respiratory: Breath sounds are clear bilaterally, No wheezing, rales or rhonchi    Cardiovascular: S1 and S2, regular rate and rhythm, no Murmurs, gallops or rubs    Gastrointestinal: Bowel Sounds present, soft, nontender, nondistended, no guarding, no rebound    Extremities: No peripheral edema    Vascular: 2+ peripheral pulses    Neurological: A/O x 3, no focal deficits    Musculoskeletal: 5/5 strength b/l upper and lower extremities    Skin: No rashes        Vital Signs: All reviewed     Labs: All reviewed    Radiology: All reviewed    Medications: All Reviewed         ASSESSMENT AND PLAN:            1) Chest Pain, atypical. ruled out ACS    Likely due to underlying uncontrolled anxiety / paranoia - possible psychosis     - Cont. Zoloft 150mg daily, Xanax 0.5 BID-- titrate xanax to daily     - Cont. Remeron at bedtime 7.5mg, d/c valium    - Cont. Seroquel -- up titrate to 25mg daily and at bedtime     - ECG - sinus tach w/ PACs, troponins negative, no active c/o of chest pain    - UA / CXR/ CTH negative    - Cont. statin     - Cardio f/u appreciated      - Psych f/u appreciated         2) Uncontrolled HTN    - likely worsened by underlying anxiety     - Cont. BB. Monitor    - add low dose Norvasc      - cardio consulted         3) DVT PPX    - SQH        f/u psych outpatient and PCP CHIEF COMPLAINT/DIAGNOSIS: Chest Discomfort         HPI:  78 y/o female with a PMHx of HTN, HLD, chronic back pain, gall stones, osteoporosis, spinal stenosis, closed compression fracture of second lumbar vertebra, anxiety presents to the ED BIBEMS from [facility] assisted living facility c/o intermittent chest discomfort. Pt recently seen at [facility] on 7/24 w/ c/o vomiting, paranoia, and delusions.    ~~ Pt notes increased stress in her life with family issues, "what is going on in this world", and her daughters placing her in an assisted living facility against her will. Pt states she chose to come to the ED today because of increased stress and fear of having a heart attack. Denies SI, hallucinations, and does not have thought of hurting herself because she "has too much to live for." Pt seems very anxious and attributes high BP to anxiety.         SUBJECTIVE:    7/29 - still w/ paranoia. improving. did not sleep much overnight.         REVIEW OF SYSTEMS:    All other review of systems is negative unless indicated above.         PHYSICAL EXAM:    Constitutional: Awake and alert, well-developed, anxious     HEENT: Normal Hearing, MMM    Neck: Soft and supple, No LAD, No JVD    Respiratory: Breath sounds are clear bilaterally, No wheezing, rales or rhonchi    Cardiovascular: S1 and S2, regular rate and rhythm, no Murmurs, gallops or rubs    Gastrointestinal: Bowel Sounds present, soft, nontender, nondistended, no guarding, no rebound    Extremities: No peripheral edema    Vascular: 2+ peripheral pulses    Neurological: A/O x 3, no focal deficits    Musculoskeletal: 5/5 strength b/l upper and lower extremities    Skin: No rashes        Vital Signs: All reviewed     Labs: All reviewed    Radiology: All reviewed    Medications: All Reviewed         ASSESSMENT AND PLAN:            1) Chest Pain, atypical. ruled out ACS    Likely due to underlying uncontrolled anxiety / paranoia - possible psychosis     - Cont. Zoloft 150mg daily, Xanax 0.5 BID-- titrate xanax to daily     - Cont. Remeron at bedtime 7.5mg, d/c valium    - Cont. Seroquel -- up titrate to 25mg daily and at bedtime     - ECG - sinus tach w/ PACs, troponins negative, no active c/o of chest pain    - UA / CXR/ CTH negative    - Cont. statin     - Cardio f/u appreciated      - Psych f/u appreciated         2) Uncontrolled HTN    - likely worsened by underlying anxiety     - Cont. BB. Monitor    - add low dose Norvasc    - STOP HCTZ      - cardio consulted         3) DVT PPX    - SQH        f/u psych outpatient and PCP. Daughter stating "wont have aides in place" explained to daughter patient medically cleared. d/c tm at 11AM. ASSESSMENT AND PLAN:            1) Chest Pain, atypical. ruled out ACS    Likely due to underlying uncontrolled anxiety / paranoia - possible psychosis     - Cont. Zoloft 150mg daily, Xanax 0.5 BID-- titrate xanax to daily     - Cont. Remeron at bedtime 7.5mg, d/c valium    - Cont. Seroquel -- up titrate to 25mg daily and at bedtime     - ECG - sinus tach w/ PACs, troponins negative, no active c/o of chest pain    - UA / CXR/ CTH negative    - Cont. statin     - Cardio f/u appreciated      - Psych f/u appreciated         2) Uncontrolled HTN    - likely worsened by underlying anxiety     - Cont. BB. Monitor    - add low dose Norvasc    - STOP HCTZ      - cardio consulted         3) DVT PPX    - SQH        f/u psych outpatient and PCP. Daughter stating "wont have aides in place" explained to daughter patient medically cleared. d/c tm at 11AM.

## 2020-07-29 NOTE — PHYSICAL THERAPY INITIAL EVALUATION ADULT - DIAGNOSIS, PT EVAL
+UTI, AMS/delirium/psychosis, anxiety/depression history, +UTI, AMS/delirium/psychosis, anxiety/depression history,severe L/S stenosis ,OP

## 2020-07-29 NOTE — PHYSICAL THERAPY INITIAL EVALUATION ADULT - SITTING BALANCE: STATIC
fair minus/listing posterior and Right in unsupported sitting and c/o dizziness on assuming upright sitting

## 2020-07-29 NOTE — DISCHARGE NOTE PROVIDER - NSDCCPCAREPLAN_GEN_ALL_CORE_FT
PRINCIPAL DISCHARGE DIAGNOSIS  Diagnosis: Chest pain  Assessment and Plan of Treatment: - You were admitted due to chest pain which was likely from uncontrolled anxiety and paranoia   - Continue Xanax 0.5mg once a day (dose changed), continue Seroquel 25mg daily and at bedtime (new medication, sent to pharmacy), Remeron 15mg at bedtime (new medication, sent to pharmacy)  - Continue Zoloft 150mg once a day   - STOP TAKING Valium  - Follow up with your primary care provider Dr. Ellis for further management.      SECONDARY DISCHARGE DIAGNOSES  Diagnosis: HTN (hypertension)  Assessment and Plan of Treatment: - Continue metoprolol 25mg twice a day   - Continue amlodipine 2.5mg once a day (new medication, sent to pharmacy)  - STOP TAKING Hydrochlorithiazide    Diagnosis: Acute UTI  Assessment and Plan of Treatment: - You were found to have an acute urinary tract infection  - Continue 1 more day of oral antibiotics Ceftin 250mg twice a day (sent to pharmacy) PRINCIPAL DISCHARGE DIAGNOSIS  Diagnosis: Chest pain  Assessment and Plan of Treatment: - You were admitted due to chest pain which was likely from uncontrolled anxiety and depression  - Continue Xanax 0.5mg once a day (dose changed), continue Seroquel 25mg daily and at bedtime (new medication, sent to pharmacy), Remeron 15mg at bedtime (new medication, sent to pharmacy)  - Continue Zoloft 150mg once a day   - STOP TAKING Valium  - Follow up with your primary care provider Dr. Ellis for further management.      SECONDARY DISCHARGE DIAGNOSES  Diagnosis: Anxiety with depression  Assessment and Plan of Treatment: - Psychiatry evaluation reccomendations:   - Continue Xanax 0.5mg once a day (dose changed), continue Seroquel 25mg daily and at bedtime (new medication, sent to pharmacy),   - Remeron 15mg at bedtime (new medication, sent to pharmacy)  - Continue Zoloft 150mg once a day   - STOP TAKING Valium    Diagnosis: HTN (hypertension)  Assessment and Plan of Treatment: - Continue metoprolol 25mg twice a day   - Continue amlodipine 2.5mg once a day (new medication, sent to pharmacy)  - STOP TAKING Hydrochlorithiazide    Diagnosis: Acute UTI  Assessment and Plan of Treatment: - You were found to have an acute urinary tract infection  - Continue 1 more day of oral antibiotics Ceftin 250mg twice a day (sent to pharmacy)

## 2020-07-29 NOTE — DISCHARGE NOTE PROVIDER - CARE PROVIDER_API CALL
Phan Ellis (DO)  Internal Medicine  [address] [place] [address]  Phone: [phone]  Fax: [phone]  Follow Up Time:

## 2020-07-29 NOTE — DISCHARGE NOTE NURSING/CASE MANAGEMENT/SOCIAL WORK - PATIENT PORTAL LINK FT
You can access the FollowMyHealth Patient Portal offered by [facility] by registering at the following website: [email or web address]. By joining [employer]’s FollowMyHealth portal, you will also be able to view your health information using other applications (apps) compatible with our system.

## 2020-07-29 NOTE — DISCHARGE NOTE PROVIDER - NSDCPNSUBOBJ_GEN_ALL_CORE
CHIEF COMPLAINT/DIAGNOSIS: Chest Discomfort         HPI:  78 y/o female with a PMHx of HTN, HLD, chronic back pain, gall stones, osteoporosis, spinal stenosis, closed compression fracture of second lumbar vertebra, anxiety presents to the ED BIBEMS from [place] assisted living facility c/o intermittent chest discomfort. Pt recently seen at [facility] on 7/24 w/ c/o vomiting, paranoia, and delusions.    ~~ Pt notes increased stress in her life with family issues, "what is going on in this world", and her daughters placing her in an assisted living facility against her will. Pt states she chose to come to the ED today because of increased stress and fear of having a heart attack. Denies SI, hallucinations, and does not have thought of hurting herself because she "has too much to live for." Pt seems very anxious and attributes high BP to anxiety.         SUBJECTIVE:    7/29 - still w/ paranoia. improving. did not sleep much overnight.         REVIEW OF SYSTEMS:    All other review of systems is negative unless indicated above.         PHYSICAL EXAM:    Constitutional: Awake and alert, well-developed, anxious     HEENT: Normal Hearing, MMM    Neck: Soft and supple, No LAD, No JVD    Respiratory: Breath sounds are clear bilaterally, No wheezing, rales or rhonchi    Cardiovascular: S1 and S2, regular rate and rhythm, no Murmurs, gallops or rubs    Gastrointestinal: Bowel Sounds present, soft, nontender, nondistended, no guarding, no rebound    Extremities: No peripheral edema    Vascular: 2+ peripheral pulses    Neurological: A/O x 3, no focal deficits    Musculoskeletal: 5/5 strength b/l upper and lower extremities    Skin: No rashes        Vital Signs Last 24 Hrs    T(C): 36.8 (29 Jul 2020 10:01), Max: 36.8 (28 Jul 2020 21:01)    T(F): 98.2 (29 Jul 2020 10:01), Max: 98.3 (28 Jul 2020 21:01)    HR: 74 (29 Jul 2020 10:01) (74 - 74)    BP: 128/53 (29 Jul 2020 10:01) (128/53 - 160/70)    BP(mean): --    RR: 18 (29 Jul 2020 10:01) (18 - 18)    SpO2: 99% (29 Jul 2020 10:01) (99% - 100%)        LABS: All Labs Reviewed:    Radiology: All reviewed    < from: CT Head No Cont (07.26.20 @ 12:16) >    Impression: No acute intracranial hemorrhage, mass effect, or acute territorial infarction. If symptoms persist, MR imaging is recommended.    < end of copied text >        MEDICATIONS  (STANDING):    ALPRAZolam 0.5 milliGRAM(s) Oral daily    amLODIPine   Tablet 2.5 milliGRAM(s) Oral daily    atorvastatin 40 milliGRAM(s) Oral at bedtime    cefuroxime   Tablet 250 milliGRAM(s) Oral every 12 hours    gabapentin 100 milliGRAM(s) Oral at bedtime    heparin   Injectable 5000 Unit(s) SubCutaneous every 12 hours    metoprolol tartrate 25 milliGRAM(s) Oral two times a day    mirtazapine 15 milliGRAM(s) Oral at bedtime    QUEtiapine 25 milliGRAM(s) Oral at bedtime    QUEtiapine 25 milliGRAM(s) Oral daily    sertraline 150 milliGRAM(s) Oral daily        MEDICATIONS  (PRN):    acetaminophen   Tablet .. 650 milliGRAM(s) Oral every 6 hours PRN Mild Pain (1 - 3)            ASSESSMENT AND PLAN:            1) Chest Pain, atypical. ruled out ACS    Likely due to underlying uncontrolled anxiety / paranoia - possible psychosis     - Cont. Zoloft 150mg daily, Xanax 0.5 BID-- titrate xanax to daily     - Cont. Remeron at bedtime 7.5mg, d/c valium    - Cont. Seroquel -- up titrate to 25mg daily and at bedtime     - ECG - sinus tach w/ PACs, troponins negative, no active c/o of chest pain    - UA / CXR/ CTH negative    - Cont. statin     - Cardio f/u appreciated      - Psych f/u appreciated         2) Uncontrolled HTN    - likely worsened by underlying anxiety     - Cont. BB. Monitor    - add low dose Norvasc    - STOP HCTZ      - cardio consulted         3) DVT PPX    - SQH        f/u psych outpatient and PCP. Daughter stating "wont have aides in place" explained to daughter patient medically cleared. d/c tm at 11AM. CHIEF COMPLAINT/DIAGNOSIS: Chest Discomfort         HPI:  78 y/o female with a PMHx of HTN, HLD, chronic back pain, gall stones, osteoporosis, spinal stenosis, closed compression fracture of second lumbar vertebra, anxiety presents to the ED BIBEMS from [place] assisted living facility c/o intermittent chest discomfort. Pt recently seen at [facility] on 7/24 w/ c/o vomiting, paranoia, and delusions.    ~~ Pt notes increased stress in her life with family issues, "what is going on in this world", and her daughters placing her in an assisted living facility against her will. Pt states she chose to come to the ED today because of increased stress and fear of having a heart attack. Denies SI, hallucinations, and does not have thought of hurting herself because she "has too much to live for." Pt seems very anxious and attributes high BP to anxiety.         SUBJECTIVE:    7/29 - still w/ paranoia. improving. did not sleep much overnight.         REVIEW OF SYSTEMS:    All other review of systems is negative unless indicated above.         PHYSICAL EXAM:    Constitutional: Awake and alert, well-developed, anxious     HEENT: Normal Hearing, MMM    Neck: Soft and supple, No LAD, No JVD    Respiratory: Breath sounds are clear bilaterally, No wheezing, rales or rhonchi    Cardiovascular: S1 and S2, regular rate and rhythm, no Murmurs, gallops or rubs    Gastrointestinal: Bowel Sounds present, soft, nontender, nondistended, no guarding, no rebound    Extremities: No peripheral edema    Vascular: 2+ peripheral pulses    Neurological: A/O x 3, no focal deficits    Musculoskeletal: 5/5 strength b/l upper and lower extremities    Skin: No rashes, no jaundice         Vital Signs Last 24 Hrs    T(C): 36.8 (29 Jul 2020 10:01), Max: 36.8 (28 Jul 2020 21:01)    T(F): 98.2 (29 Jul 2020 10:01), Max: 98.3 (28 Jul 2020 21:01)    HR: 74 (29 Jul 2020 10:01) (74 - 74)    BP: 128/53 (29 Jul 2020 10:01) (128/53 - 160/70)    BP(mean): --    RR: 18 (29 Jul 2020 10:01) (18 - 18)    SpO2: 99% (29 Jul 2020 10:01) (99% - 100%)        LABS: All Labs Reviewed:    Radiology: All reviewed    < from: CT Head No Cont (07.26.20 @ 12:16) >    Impression: No acute intracranial hemorrhage, mass effect, or acute territorial infarction. If symptoms persist, MR imaging is recommended.    < end of copied text >        MEDICATIONS  (STANDING):    ALPRAZolam 0.5 milliGRAM(s) Oral daily    amLODIPine   Tablet 2.5 milliGRAM(s) Oral daily    atorvastatin 40 milliGRAM(s) Oral at bedtime    cefuroxime   Tablet 250 milliGRAM(s) Oral every 12 hours    gabapentin 100 milliGRAM(s) Oral at bedtime    heparin   Injectable 5000 Unit(s) SubCutaneous every 12 hours    metoprolol tartrate 25 milliGRAM(s) Oral two times a day    mirtazapine 15 milliGRAM(s) Oral at bedtime    QUEtiapine 25 milliGRAM(s) Oral at bedtime    QUEtiapine 25 milliGRAM(s) Oral daily    sertraline 150 milliGRAM(s) Oral daily        MEDICATIONS  (PRN):    acetaminophen   Tablet .. 650 milliGRAM(s) Oral every 6 hours PRN Mild Pain (1 - 3)            ASSESSMENT AND PLAN:            1) Chest Pain, atypical. ruled out ACS    Likely due to underlying uncontrolled anxiety / paranoia - possible psychosis     - Cont. Zoloft 150mg daily, Xanax 0.5 BID-- titrate xanax to daily     - Cont. Remeron at bedtime 7.5mg, d/c valium    - Cont. Seroquel -- up titrate to 25mg daily and at bedtime     - ECG - sinus tach w/ PACs, troponins negative, no active c/o of chest pain    - UA / CXR/ CTH negative    - Cont. statin     - Cardio f/u appreciated      - Psych f/u appreciated         2) Uncontrolled HTN    - likely worsened by underlying anxiety     - Cont. BB. Monitor    - add low dose Norvasc    - STOP HCTZ      - cardio consulted         3) DVT PPX    - SQH        f/u psych outpatient and PCP. Daughter stating "wont have aides in place" explained to daughter patient medically cleared. d/c tm at 11AM.

## 2020-07-29 NOTE — PHYSICAL THERAPY INITIAL EVALUATION ADULT - ACTIVE RANGE OF MOTION EXAMINATION, REHAB EVAL
AAROM L hip globally limited ,as is knee FLEX mildly limited and c/o discomfort with FLEX >60 degrees in supine/deficits as listed below/bilateral upper extremity Active ROM was WFL (within functional limits)/Right LE Active ROM was WFL (within functional limits)

## 2020-07-29 NOTE — PHYSICAL THERAPY INITIAL EVALUATION ADULT - IMPAIRMENTS CONTRIBUTING TO GAIT DEVIATIONS, PT EVAL
impaired postural control/hunches over /rounded stooped standing posture and seems to rotae trunk to LEFT side

## 2020-07-29 NOTE — PHYSICAL THERAPY INITIAL EVALUATION ADULT - IMPAIRMENTS FOUND, PT EVAL
decreased midline orientation/gait, locomotion, and balance/aerobic capacity/endurance/joint integrity and mobility/muscle strength/ROM/posture

## 2020-07-29 NOTE — PHYSICAL THERAPY INITIAL EVALUATION ADULT - PATIENT/FAMILY/SIGNIFICANT OTHER GOALS STATEMENT, PT EVAL
per dtr Marielle Moeller pt has private aides x12 hours 7days/week (8a-8p) at [facility]

## 2020-07-29 NOTE — PROGRESS NOTE BEHAVIORAL HEALTH - NSBHCONSULTMEDS_PSY_A_CORE FT
PLAN  1. Zoloft 150 mg daily  2. Remeron 7.5 mg at bedtime  3. Xanax 0.5 mg once daily (taper off - and discontinue)  4. Seroquel 12.5 mg daily and 25 mg at bedtime

## 2020-07-30 VITALS
OXYGEN SATURATION: 97 % | HEART RATE: 64 BPM | TEMPERATURE: 98 F | RESPIRATION RATE: 18 BRPM | DIASTOLIC BLOOD PRESSURE: 63 MMHG | SYSTOLIC BLOOD PRESSURE: 163 MMHG

## 2020-07-30 DIAGNOSIS — F22 DELUSIONAL DISORDERS: ICD-10-CM

## 2020-07-30 PROCEDURE — 99232 SBSQ HOSP IP/OBS MODERATE 35: CPT

## 2020-07-30 RX ADMIN — Medication 250 MILLIGRAM(S): at 08:45

## 2020-07-30 RX ADMIN — Medication 0.5 MILLIGRAM(S): at 08:44

## 2020-07-30 RX ADMIN — Medication 25 MILLIGRAM(S): at 08:45

## 2020-07-30 RX ADMIN — AMLODIPINE BESYLATE 2.5 MILLIGRAM(S): 2.5 TABLET ORAL at 08:45

## 2020-07-30 RX ADMIN — SERTRALINE 150 MILLIGRAM(S): 25 TABLET, FILM COATED ORAL at 08:45

## 2020-07-30 RX ADMIN — HEPARIN SODIUM 5000 UNIT(S): 5000 INJECTION INTRAVENOUS; SUBCUTANEOUS at 05:23

## 2020-07-30 RX ADMIN — QUETIAPINE FUMARATE 25 MILLIGRAM(S): 200 TABLET, FILM COATED ORAL at 08:45

## 2020-07-30 NOTE — PROGRESS NOTE BEHAVIORAL HEALTH - NSBHCONSULTFOLLOWAFTERCARE_PSY_A_CORE FT
continue psychotropic management plan as above; return to out-patient provider at nursing home

## 2020-07-30 NOTE — PROGRESS NOTE BEHAVIORAL HEALTH - RISK ASSESSMENT
LOW RISK     ACUTE RISK FACTORS: recent mood lability with agitation, feeling alone, medication non-compliance , delirium, UTI    CHRONIC RISK FACTORS: depression,      PROTECTIVE FACTORS: no suicidal ideation/intent/plan, future oriented, motivation for psychiatric treatment, engaged in safety

## 2020-07-30 NOTE — PROGRESS NOTE BEHAVIORAL HEALTH - SUMMARY
negative...
Patient is an 78yo  female, , domiciled at [facility], with past psych hx of depression and anxiety, no prior psychiatric hospitalizations, with history of suicidal statement but no prior self-injurious behaviors or suicide attempt, was referred by [place] and brought in by EMS for paranoia with agitation.     Patient presenting calm and cooperative; pleasant; linear, organized with evidence of thought disorder. Patient has no manic / psychotic symptoms. No delusions elicited. Patient has moderate depression. Patient has no suicidal ideation. Patient mood lability, agitation and ?paranoia as reported by collateral may be influenced by presence of UTI. Interpersonal dynamics with family may be at play also. Patient has therapeutic relationships and social supports. Patient is future oriented. Patient engaged in safety planning. Patient symptoms not indicating imminent risk for harm to self; not warranting involuntary in-patient hospitalization.    7.28.2020 - Patient presenting with acute changes in mental status, indicative of delirium in the setting of UTI. Patient mentation expected to improve with improving medical status. Patient has no depressive symptoms today; with no hopelessness or suicidal ideation.     7.29.2020 - Patient presenting with improved mentation, more linear, no thought disorder. Remains with mild suspiciousness versus paranoia. Patient has no other psychotic symptoms or delusions. Patient denies depressed mood or anhedonia, hopelessness or suicidal ideation.     PLAN  1. Zoloft 150 mg daily  2. Remeron 15 mg at bedtime  3. Xanax 0.5 mg once daily  4. Seroquel 25 mg twice daily
Patient is an 80yo  female, , domiciled at [facility], with past psych hx of depression and anxiety, no prior psychiatric hospitalizations, with history of suicidal statement but no prior self-injurious behaviors or suicide attempt, was referred by [place] and brought in by EMS for paranoia with agitation.     Patient presenting calm and cooperative; pleasant; linear, organized with evidence of thought disorder. Patient has no manic / psychotic symptoms. No delusions elicited. Patient has moderate depression. Patient has no suicidal ideation. Patient mood lability, agitation and ?paranoia as reported by collateral may be influenced by presence of UTI. Interpersonal dynamics with family may be at play also. Patient has therapeutic relationships and social supports. Patient is future oriented. Patient engaged in safety planning. Patient symptoms not indicating imminent risk for harm to self; not warranting involuntary in-patient hospitalization.    7.28.2020 - Patient presenting with acute changes in mental status, indicative of delirium in the setting of UTI. Patient mentation expected to improve with improving medical status. Patient has no depressive symptoms today; with no hopelessness or suicidal ideation.     PLAN  1. Zoloft 150 mg daily  2. Remeron 7.5 mg at bedtime  3. Xanax 0.5 mg once daily (taper off)  4. Seroquel 12.5 mg daily and 25 mg at bedtime
Patient is an 80yo  female, , domiciled at [facility], with past psych hx of depression and anxiety, no prior psychiatric hospitalizations, with history of suicidal statement but no prior self-injurious behaviors or suicide attempt, was referred by [place] and brought in by EMS for paranoia with agitation.     Patient presenting calm and cooperative; pleasant; linear, organized with evidence of thought disorder. Patient has no manic / psychotic symptoms. No delusions elicited. Patient has moderate depression. Patient has no suicidal ideation. Patient mood lability, agitation and ?paranoia as reported by collateral may be influenced by presence of UTI. Interpersonal dynamics with family may be at play also. Patient has therapeutic relationships and social supports. Patient is future oriented. Patient engaged in safety planning. Patient symptoms not indicating imminent risk for harm to self; not warranting involuntary in-patient hospitalization.    7.28.2020 - Patient presenting with acute changes in mental status, indicative of delirium in the setting of UTI. Patient mentation expected to improve with improving medical status. Patient has no depressive symptoms today; with no hopelessness or suicidal ideation.     7.29.2020 - Patient presenting with improved mentation, more linear, no thought disorder. Remains with mild suspiciousness versus paranoia. Patient has no other psychotic symptoms or delusions. Patient denies depressed mood or anhedonia, hopelessness or suicidal ideation.     7.30.2020 - Patient presenting with improved mentation, more linear, with no thought disorder. Remains with mild suspiciousness versus paranoia, making with invalid statements that are not reality based, which may impair her ability to making appropriate decisions. Patient is forgetful at times, with periods of confabulation: confirmed by daughter. Patient may have onset of Dementia: neuropsychological testing recommended: information given to daughter. Patient has no other psychotic symptoms or delusions. Patient denies depressed mood or anhedonia, hopelessness or suicidal ideation.       PLAN  1. Zoloft 150 mg daily  2. Remeron 15 mg at bedtime   3. Xanax 0.5 mg once daily for Anxiety   4. Seroquel 25 mg twice daily for paranoia

## 2020-07-30 NOTE — PROGRESS NOTE BEHAVIORAL HEALTH - NSBHCONSULTOBSREASON_PSY_A_CORE FT
no acute risk for harm to self / others on unit

## 2020-07-30 NOTE — PROGRESS NOTE BEHAVIORAL HEALTH - NSBHCHARTREVIEWINVESTIGATE_PSY_A_CORE FT
EXAM:  XR CHEST PORTABLE IMMED 1V                            PROCEDURE DATE:  07/26/2020          INTERPRETATION:  Portable chest radiograph    CLINICAL INFORMATION:   Chest pain    TECHNIQUE:  Portable  AP view of the chest was obtained.    COMPARISON: 10/18/2018 chest radiograph available for review.    FINDINGS:  The lungs are clear of airspace consolidations or effusions. No pneumothorax. There is bilateral apical pleural thickening.    The heart and mediastinum are within normal limits.    Visualized osseous structures are intact.        IMPRESSION:   No evidence of active chest disease.                  JULIAN MARK M.D., ATTENDING RADIOLOGIST  This document has been electronically signed. Jul 26 2020  3:26PM

## 2020-07-30 NOTE — PROGRESS NOTE BEHAVIORAL HEALTH - NSBHCHARTREVIEWLAB_PSY_A_CORE FT
10.9   10.66 )-----------( 306      ( 28 Jul 2020 07:56 )             33.6       07-28    140  |  106  |  24<H>  ----------------------------<  90  4.2   |  25  |  1.07    Ca    9.8      28 Jul 2020 07:56  Mg     2.3     07-28                        Lactate Trend            CAPILLARY BLOOD GLUCOSE

## 2020-07-30 NOTE — PROGRESS NOTE BEHAVIORAL HEALTH - PERCEPTIONS
Hide Include Location In Plan Question?: No
No abnormalities
Detail Level: Generalized
No abnormalities
No abnormalities

## 2020-07-30 NOTE — PROGRESS NOTE BEHAVIORAL HEALTH - NSBHCHARTREVIEWVS_PSY_A_CORE FT
Vital Signs Last 24 Hrs  T(C): 36.7 (28 Jul 2020 09:15), Max: 36.9 (27 Jul 2020 17:05)  T(F): 98.1 (28 Jul 2020 09:15), Max: 98.4 (27 Jul 2020 17:05)  HR: 72 (28 Jul 2020 09:15) (67 - 75)  BP: 179/82 (28 Jul 2020 09:15) (131/60 - 179/82)  BP(mean): --  RR: 18 (28 Jul 2020 09:15) (17 - 18)  SpO2: 96% (28 Jul 2020 09:15) (96% - 99%)
Vital Signs Last 24 Hrs  T(C): 36.6 (30 Jul 2020 07:45), Max: 36.7 (29 Jul 2020 20:06)  T(F): 97.8 (30 Jul 2020 07:45), Max: 98.1 (29 Jul 2020 20:06)  HR: 64 (30 Jul 2020 07:45) (64 - 68)  BP: 163/63 (30 Jul 2020 07:45) (133/41 - 163/63)  BP(mean): --  RR: 18 (30 Jul 2020 07:45) (17 - 18)  SpO2: 97% (30 Jul 2020 07:45) (93% - 97%)
Vital Signs Last 24 Hrs  T(C): 36.8 (29 Jul 2020 10:01), Max: 36.8 (28 Jul 2020 21:01)  T(F): 98.2 (29 Jul 2020 10:01), Max: 98.3 (28 Jul 2020 21:01)  HR: 74 (29 Jul 2020 10:01) (74 - 74)  BP: 128/53 (29 Jul 2020 10:01) (128/53 - 160/70)  BP(mean): --  RR: 18 (29 Jul 2020 10:01) (18 - 18)  SpO2: 99% (29 Jul 2020 10:01) (99% - 100%)

## 2020-07-30 NOTE — PROGRESS NOTE BEHAVIORAL HEALTH - PRIMARY DX
JOSR (generalized anxiety disorder)
JOSR (generalized anxiety disorder)
Delirium due to known physiological condition

## 2020-08-02 DIAGNOSIS — N39.0 URINARY TRACT INFECTION, SITE NOT SPECIFIED: ICD-10-CM

## 2020-08-02 DIAGNOSIS — M48.061 SPINAL STENOSIS, LUMBAR REGION WITHOUT NEUROGENIC CLAUDICATION: ICD-10-CM

## 2020-08-02 DIAGNOSIS — Z87.891 PERSONAL HISTORY OF NICOTINE DEPENDENCE: ICD-10-CM

## 2020-08-02 DIAGNOSIS — B96.20 UNSPECIFIED ESCHERICHIA COLI [E. COLI] AS THE CAUSE OF DISEASES CLASSIFIED ELSEWHERE: ICD-10-CM

## 2020-08-02 DIAGNOSIS — G89.29 OTHER CHRONIC PAIN: ICD-10-CM

## 2020-08-02 DIAGNOSIS — F41.8 OTHER SPECIFIED ANXIETY DISORDERS: ICD-10-CM

## 2020-08-02 DIAGNOSIS — M80.08XS AGE-RELATED OSTEOPOROSIS WITH CURRENT PATHOLOGICAL FRACTURE, VERTEBRA(E), SEQUELA: ICD-10-CM

## 2020-08-02 DIAGNOSIS — F05 DELIRIUM DUE TO KNOWN PHYSIOLOGICAL CONDITION: ICD-10-CM

## 2020-08-02 DIAGNOSIS — M51.36 OTHER INTERVERTEBRAL DISC DEGENERATION, LUMBAR REGION: ICD-10-CM
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
## 2020-08-02 DIAGNOSIS — E87.6 HYPOKALEMIA: ICD-10-CM

## 2020-08-02 DIAGNOSIS — I10 ESSENTIAL (PRIMARY) HYPERTENSION: ICD-10-CM

## 2020-08-02 DIAGNOSIS — Z79.82 LONG TERM (CURRENT) USE OF ASPIRIN: ICD-10-CM
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
## 2020-08-02 DIAGNOSIS — F32.9 MAJOR DEPRESSIVE DISORDER, SINGLE EPISODE, UNSPECIFIED: ICD-10-CM

## 2020-08-02 DIAGNOSIS — R07.9 CHEST PAIN, UNSPECIFIED: ICD-10-CM

## 2020-08-02 DIAGNOSIS — M54.16 RADICULOPATHY, LUMBAR REGION: ICD-10-CM

## 2020-08-02 DIAGNOSIS — F22 DELUSIONAL DISORDERS: ICD-10-CM

## 2020-08-02 DIAGNOSIS — R45.1 RESTLESSNESS AND AGITATION: ICD-10-CM

## 2020-08-02 DIAGNOSIS — E78.5 HYPERLIPIDEMIA, UNSPECIFIED: ICD-10-CM

## 2020-08-02 DIAGNOSIS — Z91.81 HISTORY OF FALLING: ICD-10-CM

## 2020-08-02 DIAGNOSIS — M47.816 SPONDYLOSIS WITHOUT MYELOPATHY OR RADICULOPATHY, LUMBAR REGION: ICD-10-CM

## 2020-08-07 ENCOUNTER — INPATIENT (INPATIENT)
Facility: HOSPITAL | Age: 80
LOS: 13 days | Discharge: OTHER INSTITUTION | DRG: 885 | End: 2020-08-21
Attending: PSYCHIATRY & NEUROLOGY | Admitting: PSYCHIATRY & NEUROLOGY
Payer: MEDICARE

## 2020-08-07 VITALS
TEMPERATURE: 98 F | OXYGEN SATURATION: 99 % | HEART RATE: 65 BPM | SYSTOLIC BLOOD PRESSURE: 106 MMHG | HEIGHT: 68 IN | DIASTOLIC BLOOD PRESSURE: 59 MMHG | RESPIRATION RATE: 18 BRPM | WEIGHT: 139.99 LBS

## 2020-08-07 DIAGNOSIS — Z92.241 PERSONAL HISTORY OF SYSTEMIC STEROID THERAPY: Chronic | ICD-10-CM

## 2020-08-07 DIAGNOSIS — F03.91 UNSPECIFIED DEMENTIA WITH BEHAVIORAL DISTURBANCE: ICD-10-CM

## 2020-08-07 DIAGNOSIS — Z98.890 OTHER SPECIFIED POSTPROCEDURAL STATES: Chronic | ICD-10-CM

## 2020-08-07 DIAGNOSIS — F41.1 GENERALIZED ANXIETY DISORDER: ICD-10-CM

## 2020-08-07 DIAGNOSIS — F03.90 UNSPECIFIED DEMENTIA WITHOUT BEHAVIORAL DISTURBANCE: ICD-10-CM

## 2020-08-07 DIAGNOSIS — F22 DELUSIONAL DISORDERS: ICD-10-CM

## 2020-08-07 LAB
ADD ON TEST-SPECIMEN IN LAB: SIGNIFICANT CHANGE UP
ALBUMIN SERPL ELPH-MCNC: 3.9 G/DL — SIGNIFICANT CHANGE UP (ref 3.3–5)
ALP SERPL-CCNC: 77 U/L — SIGNIFICANT CHANGE UP (ref 40–120)
ALT FLD-CCNC: 35 U/L — SIGNIFICANT CHANGE UP (ref 12–78)
ANION GAP SERPL CALC-SCNC: 6 MMOL/L — SIGNIFICANT CHANGE UP (ref 5–17)
APPEARANCE UR: CLEAR — SIGNIFICANT CHANGE UP
APTT BLD: 31 SEC — SIGNIFICANT CHANGE UP (ref 27.5–35.5)
AST SERPL-CCNC: 18 U/L — SIGNIFICANT CHANGE UP (ref 15–37)
BASOPHILS # BLD AUTO: 0.05 K/UL — SIGNIFICANT CHANGE UP (ref 0–0.2)
BASOPHILS NFR BLD AUTO: 0.5 % — SIGNIFICANT CHANGE UP (ref 0–2)
BILIRUB SERPL-MCNC: 0.3 MG/DL — SIGNIFICANT CHANGE UP (ref 0.2–1.2)
BILIRUB UR-MCNC: NEGATIVE — SIGNIFICANT CHANGE UP
BUN SERPL-MCNC: 32 MG/DL — HIGH (ref 7–23)
CALCIUM SERPL-MCNC: 9.9 MG/DL — SIGNIFICANT CHANGE UP (ref 8.5–10.1)
CHLORIDE SERPL-SCNC: 109 MMOL/L — HIGH (ref 96–108)
CK SERPL-CCNC: 112 U/L — SIGNIFICANT CHANGE UP (ref 26–192)
CO2 SERPL-SCNC: 26 MMOL/L — SIGNIFICANT CHANGE UP (ref 22–31)
COLOR SPEC: YELLOW — SIGNIFICANT CHANGE UP
CREAT SERPL-MCNC: 1.27 MG/DL — SIGNIFICANT CHANGE UP (ref 0.5–1.3)
DIFF PNL FLD: NEGATIVE — SIGNIFICANT CHANGE UP
EOSINOPHIL # BLD AUTO: 0.76 K/UL — HIGH (ref 0–0.5)
EOSINOPHIL NFR BLD AUTO: 6.8 % — HIGH (ref 0–6)
GLUCOSE SERPL-MCNC: 91 MG/DL — SIGNIFICANT CHANGE UP (ref 70–99)
GLUCOSE UR QL: NEGATIVE MG/DL — SIGNIFICANT CHANGE UP
HCT VFR BLD CALC: 32.7 % — LOW (ref 34.5–45)
HGB BLD-MCNC: 10.7 G/DL — LOW (ref 11.5–15.5)
IMM GRANULOCYTES NFR BLD AUTO: 0.4 % — SIGNIFICANT CHANGE UP (ref 0–1.5)
INR BLD: 1.02 RATIO — SIGNIFICANT CHANGE UP (ref 0.88–1.16)
KETONES UR-MCNC: NEGATIVE — SIGNIFICANT CHANGE UP
LEUKOCYTE ESTERASE UR-ACNC: NEGATIVE — SIGNIFICANT CHANGE UP
LYMPHOCYTES # BLD AUTO: 2.43 K/UL — SIGNIFICANT CHANGE UP (ref 1–3.3)
LYMPHOCYTES # BLD AUTO: 21.9 % — SIGNIFICANT CHANGE UP (ref 13–44)
MCHC RBC-ENTMCNC: 32.3 PG — SIGNIFICANT CHANGE UP (ref 27–34)
MCHC RBC-ENTMCNC: 32.7 GM/DL — SIGNIFICANT CHANGE UP (ref 32–36)
MCV RBC AUTO: 98.8 FL — SIGNIFICANT CHANGE UP (ref 80–100)
MONOCYTES # BLD AUTO: 1.01 K/UL — HIGH (ref 0–0.9)
MONOCYTES NFR BLD AUTO: 9.1 % — SIGNIFICANT CHANGE UP (ref 2–14)
NEUTROPHILS # BLD AUTO: 6.81 K/UL — SIGNIFICANT CHANGE UP (ref 1.8–7.4)
NEUTROPHILS NFR BLD AUTO: 61.3 % — SIGNIFICANT CHANGE UP (ref 43–77)
NITRITE UR-MCNC: NEGATIVE — SIGNIFICANT CHANGE UP
PH UR: 6 — SIGNIFICANT CHANGE UP (ref 5–8)
PLATELET # BLD AUTO: 307 K/UL — SIGNIFICANT CHANGE UP (ref 150–400)
POTASSIUM SERPL-MCNC: 3.9 MMOL/L — SIGNIFICANT CHANGE UP (ref 3.5–5.3)
POTASSIUM SERPL-SCNC: 3.9 MMOL/L — SIGNIFICANT CHANGE UP (ref 3.5–5.3)
PROT SERPL-MCNC: 7.5 GM/DL — SIGNIFICANT CHANGE UP (ref 6–8.3)
PROT UR-MCNC: NEGATIVE MG/DL — SIGNIFICANT CHANGE UP
PROTHROM AB SERPL-ACNC: 11.9 SEC — SIGNIFICANT CHANGE UP (ref 10.6–13.6)
RBC # BLD: 3.31 M/UL — LOW (ref 3.8–5.2)
RBC # FLD: 12.5 % — SIGNIFICANT CHANGE UP (ref 10.3–14.5)
SARS-COV-2 RNA SPEC QL NAA+PROBE: SIGNIFICANT CHANGE UP
SODIUM SERPL-SCNC: 141 MMOL/L — SIGNIFICANT CHANGE UP (ref 135–145)
SP GR SPEC: 1 — LOW (ref 1.01–1.02)
TROPONIN I SERPL-MCNC: 0.03 NG/ML — SIGNIFICANT CHANGE UP (ref 0.01–0.04)
UROBILINOGEN FLD QL: NEGATIVE MG/DL — SIGNIFICANT CHANGE UP
WBC # BLD: 11.1 K/UL — HIGH (ref 3.8–10.5)
WBC # FLD AUTO: 11.1 K/UL — HIGH (ref 3.8–10.5)

## 2020-08-07 PROCEDURE — 86769 SARS-COV-2 COVID-19 ANTIBODY: CPT

## 2020-08-07 PROCEDURE — 36415 COLL VENOUS BLD VENIPUNCTURE: CPT

## 2020-08-07 PROCEDURE — 97116 GAIT TRAINING THERAPY: CPT | Mod: GP

## 2020-08-07 PROCEDURE — 83036 HEMOGLOBIN GLYCOSYLATED A1C: CPT

## 2020-08-07 PROCEDURE — 85025 COMPLETE CBC W/AUTO DIFF WBC: CPT

## 2020-08-07 PROCEDURE — 94640 AIRWAY INHALATION TREATMENT: CPT

## 2020-08-07 PROCEDURE — 80061 LIPID PANEL: CPT

## 2020-08-07 PROCEDURE — 84443 ASSAY THYROID STIM HORMONE: CPT

## 2020-08-07 PROCEDURE — 71045 X-RAY EXAM CHEST 1 VIEW: CPT

## 2020-08-07 PROCEDURE — 85027 COMPLETE CBC AUTOMATED: CPT

## 2020-08-07 PROCEDURE — 97162 PT EVAL MOD COMPLEX 30 MIN: CPT | Mod: GP

## 2020-08-07 PROCEDURE — 99222 1ST HOSP IP/OBS MODERATE 55: CPT

## 2020-08-07 PROCEDURE — U0003: CPT

## 2020-08-07 PROCEDURE — 97530 THERAPEUTIC ACTIVITIES: CPT | Mod: GP

## 2020-08-07 PROCEDURE — 80048 BASIC METABOLIC PNL TOTAL CA: CPT

## 2020-08-07 PROCEDURE — 70450 CT HEAD/BRAIN W/O DYE: CPT | Mod: 26

## 2020-08-07 RX ORDER — ALPRAZOLAM 0.25 MG
0.5 TABLET ORAL ONCE
Refills: 0 | Status: DISCONTINUED | OUTPATIENT
Start: 2020-08-07 | End: 2020-08-07

## 2020-08-07 RX ORDER — QUETIAPINE FUMARATE 200 MG/1
50 TABLET, FILM COATED ORAL ONCE
Refills: 0 | Status: COMPLETED | OUTPATIENT
Start: 2020-08-07 | End: 2020-08-07

## 2020-08-07 RX ORDER — ACETAMINOPHEN 500 MG
1000 TABLET ORAL ONCE
Refills: 0 | Status: COMPLETED | OUTPATIENT
Start: 2020-08-07 | End: 2020-08-07

## 2020-08-07 RX ORDER — FLUTICASONE PROPIONATE 50 MCG
2 SPRAY, SUSPENSION NASAL
Refills: 0 | Status: DISCONTINUED | OUTPATIENT
Start: 2020-08-07 | End: 2020-08-21

## 2020-08-07 RX ORDER — FAMOTIDINE 10 MG/ML
20 INJECTION INTRAVENOUS ONCE
Refills: 0 | Status: COMPLETED | OUTPATIENT
Start: 2020-08-07 | End: 2020-08-07

## 2020-08-07 RX ORDER — METOPROLOL TARTRATE 50 MG
25 TABLET ORAL
Refills: 0 | Status: DISCONTINUED | OUTPATIENT
Start: 2020-08-07 | End: 2020-08-21

## 2020-08-07 RX ORDER — LANOLIN ALCOHOL/MO/W.PET/CERES
0.5 CREAM (GRAM) TOPICAL
Qty: 0 | Refills: 0 | DISCHARGE

## 2020-08-07 RX ORDER — HALOPERIDOL DECANOATE 100 MG/ML
1 INJECTION INTRAMUSCULAR ONCE
Refills: 0 | Status: DISCONTINUED | OUTPATIENT
Start: 2020-08-07 | End: 2020-08-20

## 2020-08-07 RX ORDER — ALPRAZOLAM 0.25 MG
0.5 TABLET ORAL DAILY
Refills: 0 | Status: DISCONTINUED | OUTPATIENT
Start: 2020-08-07 | End: 2020-08-14

## 2020-08-07 RX ORDER — QUETIAPINE FUMARATE 200 MG/1
25 TABLET, FILM COATED ORAL
Refills: 0 | Status: DISCONTINUED | OUTPATIENT
Start: 2020-08-07 | End: 2020-08-10

## 2020-08-07 RX ORDER — SERTRALINE 25 MG/1
150 TABLET, FILM COATED ORAL DAILY
Refills: 0 | Status: DISCONTINUED | OUTPATIENT
Start: 2020-08-07 | End: 2020-08-11

## 2020-08-07 RX ORDER — ATORVASTATIN CALCIUM 80 MG/1
40 TABLET, FILM COATED ORAL AT BEDTIME
Refills: 0 | Status: DISCONTINUED | OUTPATIENT
Start: 2020-08-07 | End: 2020-08-21

## 2020-08-07 RX ORDER — MIRTAZAPINE 45 MG/1
15 TABLET, ORALLY DISINTEGRATING ORAL AT BEDTIME
Refills: 0 | Status: DISCONTINUED | OUTPATIENT
Start: 2020-08-07 | End: 2020-08-11

## 2020-08-07 RX ORDER — SENNA PLUS 8.6 MG/1
2 TABLET ORAL AT BEDTIME
Refills: 0 | Status: DISCONTINUED | OUTPATIENT
Start: 2020-08-07 | End: 2020-08-21

## 2020-08-07 RX ORDER — FAMOTIDINE 10 MG/ML
20 INJECTION INTRAVENOUS DAILY
Refills: 0 | Status: DISCONTINUED | OUTPATIENT
Start: 2020-08-07 | End: 2020-08-21

## 2020-08-07 RX ORDER — SODIUM CHLORIDE 9 MG/ML
1000 INJECTION INTRAMUSCULAR; INTRAVENOUS; SUBCUTANEOUS ONCE
Refills: 0 | Status: COMPLETED | OUTPATIENT
Start: 2020-08-07 | End: 2020-08-07

## 2020-08-07 RX ORDER — AMLODIPINE BESYLATE 2.5 MG/1
2.5 TABLET ORAL DAILY
Refills: 0 | Status: DISCONTINUED | OUTPATIENT
Start: 2020-08-07 | End: 2020-08-21

## 2020-08-07 RX ADMIN — Medication 1000 MILLIGRAM(S): at 04:49

## 2020-08-07 RX ADMIN — FAMOTIDINE 20 MILLIGRAM(S): 10 INJECTION INTRAVENOUS at 03:25

## 2020-08-07 RX ADMIN — Medication 2 SPRAY(S): at 21:56

## 2020-08-07 RX ADMIN — SODIUM CHLORIDE 1000 MILLILITER(S): 9 INJECTION INTRAMUSCULAR; INTRAVENOUS; SUBCUTANEOUS at 04:47

## 2020-08-07 RX ADMIN — Medication 0.5 MILLIGRAM(S): at 11:21

## 2020-08-07 RX ADMIN — MIRTAZAPINE 15 MILLIGRAM(S): 45 TABLET, ORALLY DISINTEGRATING ORAL at 21:56

## 2020-08-07 RX ADMIN — QUETIAPINE FUMARATE 25 MILLIGRAM(S): 200 TABLET, FILM COATED ORAL at 21:56

## 2020-08-07 RX ADMIN — ATORVASTATIN CALCIUM 40 MILLIGRAM(S): 80 TABLET, FILM COATED ORAL at 21:56

## 2020-08-07 RX ADMIN — Medication 25 MILLIGRAM(S): at 21:56

## 2020-08-07 RX ADMIN — QUETIAPINE FUMARATE 50 MILLIGRAM(S): 200 TABLET, FILM COATED ORAL at 11:21

## 2020-08-07 NOTE — ED BEHAVIORAL HEALTH ASSESSMENT NOTE - INTERRUPTED ATTEMPT:
Bilobed Transposition Flap Text: The defect edges were debeveled with a #15 scalpel blade.  Given the location of the defect and the proximity to free margins a bilobed transposition flap was deemed most appropriate.  Using a sterile surgical marker, an appropriate bilobe flap drawn around the defect.    The area thus outlined was incised deep to adipose tissue with a #15 scalpel blade.  The skin margins were undermined to an appropriate distance in all directions utilizing iris scissors. None known

## 2020-08-07 NOTE — ED ADULT NURSE NOTE - CHIEF COMPLAINT QUOTE
altered mental status and hallucination as per staff at [facility]. Pt awake alert and oriented x4 in Triage. Denies any pain or complaints. Denies Si or HI.

## 2020-08-07 NOTE — ED ADULT TRIAGE NOTE - CHIEF COMPLAINT QUOTE
altered mental status and hallucination as per staff at [facility]. Pt awake alert and oriented x4 in Triage. Denies any pain or complaints. Denies Si or HI.

## 2020-08-07 NOTE — ED ADULT NURSE REASSESSMENT NOTE - NS ED NURSE REASSESS COMMENT FT1
Spoke with daughter Marielle, states Drew said that pt was yelling there was no floor under her and she is going to fall into the floor, pt with increased paranoia, thinks all care is involved with insurance fraud. daughter feels as though she needs better management with her psych meds at this time.

## 2020-08-07 NOTE — ED BEHAVIORAL HEALTH ASSESSMENT NOTE - DESCRIPTION
UTI see BH note As per HPI see BH note    COVID Exposure Screen- Patient    1.	*In the past 14 days, have you been around anyone with a positive COVID-19 test?*   (  ) Yes   (  x) No   (  ) Unknown- Reason (e.g. patient uncertain, sedated, refusing to answer, etc.):  ______  IF YES PROCEED TO QUESTION #2. IF NO or UNKNOWN, PLEASE SKIP TO QUESTION #7  2.	Were you within 6 feet of them for at least 15 minutes? (  ) Yes   (  ) No   (  ) Unknown- Reason: ______    3.	Have you provided care for them? (  ) Yes   (  ) No   (  ) Unknown- Reason: ______    4.	Have you had direct physical contact with them (touched, hugged, or kissed them)? (  ) Yes   (  ) No    (  ) Unknown- Reason: ______    5.	Have you shared eating or drinking utensils with them? (  ) Yes   (  ) No    (  ) Unknown- Reason: ______    6.	Have they sneezed, coughed, or somehow got respiratory droplets on you? (  ) Yes   (  ) No    (  ) Unknown- Reason: ______    7.	*Have you been out of [place] within the past 14 days?*  (  ) Yes   (x  ) No   (  ) Unknown- Reason (e.g. patient uncertain, sedated, refusing to answer, etc.): _______  IF YES PLEASE ANSWER THE FOLLOWING QUESTIONS:  8.	Which state/country have you been to? ______   9.	Were you there over 24 hours? (  ) Yes   (  ) No    (  ) Unknown- Reason: ______    10.	What date did you return to [facility]? ______

## 2020-08-07 NOTE — ED BEHAVIORAL HEALTH ASSESSMENT NOTE - SUMMARY
Patient is an 80yo  female, , domiciled at [facility], with past psych hx of depression and anxiety, no prior psychiatric hospitalizations, with history of suicidal statement but no prior self-injurious behaviors or suicide attempt, was referred by [place] and brought in by EMS for paranoia with agitation.   pt presents with increasing paranoid ideas and agitation at her facility. she is confused on interview. no evidence of suicidality. collateral from facility at this point in time is limited, pt should be held pending more detailed collateral from facility and family in order to formulate a plan for treatment of what appears to be worsening dementia with paranoia/behavioral disturbance. Patient is an 80yo  female, , domiciled at [facility], with past psych hx of depression and anxiety, no prior psychiatric hospitalizations, with history of suicidal statement but no prior self-injurious behaviors or suicide attempt, was referred by [place] and brought in by EMS for paranoia with agitation.   pt presents with increasing paranoid ideas and agitation at her facility. she is confused on interview. no evidence of suicidality. collateral from facility at this point in time is limited, pt should be held pending more detailed collateral from facility and family in order to formulate a plan for treatment of what appears to be worsening dementia with paranoia/behavioral disturbance.    RE-EVAL: 8.7.2020 - Patient symptoms presenting as an acute risk for harm to self / others secondary to paranoia, poor insight and judgment, in the setting of dementia along with behavioral disturbance, requiring in-patient hospitalization for safety and stabilization. Patient is an 80yo  female, , domiciled at [facility], with past psych hx of depression and anxiety, no prior psychiatric hospitalizations, with history of suicidal statement but no prior self-injurious behaviors or suicide attempt, was referred by [place] and brought in by EMS for paranoia with agitation.   pt presents with increasing paranoid ideas and agitation at her facility. she is confused on interview. no evidence of suicidality. collateral from facility at this point in time is limited, pt should be held pending more detailed collateral from facility and family in order to formulate a plan for treatment of what appears to be worsening dementia with paranoia/behavioral disturbance.    RE-EVAL: 8.7.2020 - Patient symptoms presenting as an acute risk for harm to self / others secondary to paranoia, poor insight and judgment, ?in the setting of ?dementia. As per Dr. Moon, patient does not have a diagnosis of dementia. Patient has behavioral disturbance due to paranoia, of which would benefit from and requiring in-patient hospitalization for safety and stabilization. Patient is an 80yo  female, , domiciled at [facility], with past psych hx of depression and anxiety, no prior psychiatric hospitalizations, with history of suicidal statement but no prior self-injurious behaviors or suicide attempt, was referred by [place] and brought in by EMS for paranoia with agitation.   pt presents with increasing paranoid ideas and agitation at her facility. she is confused on interview. no evidence of suicidality. collateral from facility at this point in time is limited, pt should be held pending more detailed collateral from facility and family in order to formulate a plan for treatment of what appears to be worsening dementia with paranoia/behavioral disturbance.    RE-EVAL: 8.7.2020 - Patient symptoms presenting as an acute risk for harm to self / others secondary to paranoia, poor insight and judgment, ?in the setting of ?dementia. As per Dr. Moon, and daughter, patient does not have a diagnosis of Dementia. Hence. Patient presenting indicating a psychosis (paranoia). Patient has behavioral disturbance due to paranoia, of which would benefit from and requiring in-patient hospitalization for safety and stabilization. Patient is an 78yo  female, , domiciled at [facility], with past psych hx of depression and anxiety, no prior psychiatric hospitalizations, with history of suicidal statement but no prior self-injurious behaviors or suicide attempt, was referred by [place] and brought in by EMS for paranoia with agitation.   pt presents with increasing paranoid ideas and agitation at her facility. she is confused on interview. no evidence of suicidality. collateral from facility at this point in time is limited, pt should be held pending more detailed collateral from facility and family in order to formulate a plan for treatment of what appears to be worsening dementia with paranoia/behavioral disturbance.    RE-EVAL: 8.7.2020 - Patient symptoms presenting as an acute risk for harm to self / others secondary to paranoia, poor insight and judgment, ?in the setting of ?dementia. As per Dr. Moon, and daughter, patient does not have a diagnosis of Dementia. Hence. Patient presenting indicating a psychosis (paranoia). Patient's paranoia also exacerbating her Anxiety. Patient has recent active depressive symptoms secondary to being in [place]. Patient has behavioral disturbance due to paranoia, of which would benefit from and requiring in-patient hospitalization for safety and stabilization.

## 2020-08-07 NOTE — PROGRESS NOTE BEHAVIORAL HEALTH - SUMMARY
Patient is an 78yo  female, , domiciled at [facility], with past psych hx of depression and anxiety, no prior psychiatric hospitalizations, with history of suicidal statement but no prior self-injurious behaviors or suicide attempt, was referred by [facility] and brought in by EMS for paranoia with agitation.   Patient recently was admitted to medicine for UTI and followed by [facility] psychiatry, discharged on 7/30, for uti, pt has been more paranoid as of late, believing people at [facility] are against her, and that her daughter is conspiring against her as well by selling her home.     Patient currently upset, annoyed and irritated, endorsing that she does not belong here and to go back to her home. She is paranoid that her daughters are selling her house.    Plan: Admit Involuntarily          Start meds for stability/safety          Collateral info          H&P from Medicine

## 2020-08-07 NOTE — ED BEHAVIORAL HEALTH ASSESSMENT NOTE - NS ED BHA PLAN PSYCHIATRIC ISSUES2 FT
Seroquel 50 mg in the morning and 50 mg at bedtime. Inpatient team to consider Risperidone if Seroquel is ineffective. Discontinue Xanax.

## 2020-08-07 NOTE — INPATIENT CERTIFICATION FOR MEDICARE PATIENTS - THE SEVERITY OF SIGNS/SYMPTOMS. (SEE ED/ADMIT DOCUMENTS)
"Chief Complaint   Patient presents with     Cough     Nasal Congestion       Initial Pulse 142   Temp 97.6  F (36.4  C) (Tympanic)   Resp (!) 42   Ht 0.66 m (2' 2\")   Wt 5.786 kg (12 lb 12.1 oz)   SpO2 100%   BMI 13.27 kg/m   Estimated body mass index is 13.27 kg/m  as calculated from the following:    Height as of this encounter: 0.66 m (2' 2\").    Weight as of this encounter: 5.786 kg (12 lb 12.1 oz).  Medication Reconciliation: complete    Ashley A. Lechevalier, LPN  " 1. The severity of signs/symptoms.(See ED/admit documents)

## 2020-08-07 NOTE — ED PROVIDER NOTE - PROGRESS NOTE DETAILS
Patient medically cleared.  Cooperative in ED, but anxious.  Wanted meds for her pain in her back.  Has abrasions on legs but no evidence of cellulitis or major injury.  Discussed case with Dr. Kruger from telepsychiatry who will interview patient for her worsening paranoia. per Psych, patient to be admitted.

## 2020-08-07 NOTE — ED BEHAVIORAL HEALTH NOTE - BEHAVIORAL HEALTH NOTE
===================  PRE-HOSPITAL COURSE  ===================  SOURCE:  RN and triage documentation.   DETAILS:  Patient was BIBEMS from assisted living facility; chief complaint of agitation/confusion.      ============  ED COURSE   ============  SOURCE:  RN and triage documentation.   ARRIVAL:  Patient was cooperative with triage process; remains in her clothing. Patient presents with fair hygiene and grooming.   BELONGINGS: Patient in possession of her items.   BEHAVIOR: Patient has been cooperative while in ED and has provided blood/urine for routine labs without incident. Patient presently denying SI/HI/AH/VH, however presents as very anxious/delusional that there is insurance fraud going on at her assisted living facility and people are conspiring against her. Patient presents as depressed. Patient's speech is of normal rate/volume; patient stated it was January however knows her name and where she is. Patient has been resting in hospital bed and has not eaten or drank while in ED.  TREATMENT:  Patient has not required psychiatric medication intervention while in ED; has been in full behavioral control.   VISITORS:  Patient is unaccompanied by social supports from facility while in ED; contact information for assisted living facility in chart.    [facility] contacted [facility] at [phone] and was connected to medical technician Johana who states patient was speaking bizarrely, confused, called [employer] citing something about a hole on her room. Patient was calling the  and was speaking erratically and nonsensically; this is off patient's baseline. Collateral looked in patient's chart and stated she did not see any specific psychiatric diagnosis or information at this time; diverted [facility] to call [facility] at [phone] to ask safety questions and to see if patient would be accepted back to the facility. Collateral could not confirm any further information.

## 2020-08-07 NOTE — ED BEHAVIORAL HEALTH ASSESSMENT NOTE - RISK ASSESSMENT
denies current SI/intent /plan     ACUTE RISK FACTORS: recent mood lability with agitation, feeling alone, medication non-compliance     CHRONIC RISK FACTORS: depression,      PROTECTIVE FACTORS: no suicidal ideation/intent/plan, future oriented, motivation for psychiatric treatment, engaged in safety planning. Unable to determine Suicide Risk LOW RISK FOR SUICIDE; HIGH RISK TO HARM SELF / OTHERS DUE TO ACUTE RISK FACTORS BELOW    ACUTE RISK FACTORS: recent mood lability with agitation, depression, anxiety, feeling alone, medication non-compliance, paranoia, impulsivity, poor insight and judgment.    CHRONIC RISK FACTORS: depression,      PROTECTIVE FACTORS: no suicidal ideation/intent/plan, future oriented, motivation for psychiatric treatment, engaged in safety planning. Low Acute Suicide Risk

## 2020-08-07 NOTE — ED PROVIDER NOTE - CLINICAL SUMMARY MEDICAL DECISION MAKING FREE TEXT BOX
Elderly patient with behavioral disturbance and AMS and paranoia sent by assisted living for psych eval.

## 2020-08-07 NOTE — ED PROVIDER NOTE - OBJECTIVE STATEMENT
Pt. is an 78 yo F with a hx of spinal stenosis, chronic back pain, osteoporosis, gallstones, hyperlipidemia sent from the [facility] assisted living for AMS.  Patient states that everyone at the [facility] is conspiring against her.  She states she has had a lot of change in her routine now with living in assisted living, says her daughters may also now be "in on it."  She notices when she is talking she sounds paranoid, but truly believes she is living in a dangerous place.  She states she gets anxious about the change.  She has chronic back pain, no change from the past.  She has left lower leg abrasions.  She states she was here a little over a week ago for the same.  She denies headache, fever, chills, abdominal pain, trouble breathing, trouble eating.

## 2020-08-07 NOTE — PATIENT PROFILE BEHAVIORAL HEALTH - VISION (WITH CORRECTIVE LENSES IF THE PATIENT USUALLY WEARS THEM):
wears reading glassess/Partially impaired: cannot see medication labels or newsprint, but can see obstacles in path, and the surrounding layout; can count fingers at arm's length

## 2020-08-07 NOTE — PHARMACOTHERAPY INTERVENTION NOTE - COMMENTS
Medication history complete. Obtained medications information from med list that was sent over from [facility] (pt has altered mental status). Medications with check with [facility].
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
## 2020-08-07 NOTE — ED ADULT NURSE NOTE - OBJECTIVE STATEMENT
Pt presents to er with AMS as per staff, pt is alert to name, , situation, disoriented to time and thought it was January, states her left lower extremity hurts her and is swollen with pitting edema, pt has history of chronic back pain issues and has difficulty moving, pt states she feels dizzy at times, denies chest pain, sob, safety maintained, will continue to monitor.

## 2020-08-07 NOTE — PROGRESS NOTE BEHAVIORAL HEALTH - NSBHFUPINTERVALHXFT_PSY_A_CORE
Patient is an 78y/o  female, , domiciled at [facility], with past psych hx of depression and anxiety, no prior psychiatric hospitalizations, with history of suicidal statement but no prior self-injurious behaviors or suicide attempt, was referred by [place] and brought in by EMS for paranoia with agitation.    Patient recently was admitted to Medicine for UTI and followed by [facility] c/l psychiatry, discharged on 7/30, for UTI pt has been more paranoid as of late, believing people at Assisted Living Facility are against her, and that her daughter is conspiring against her as well by selling her home.   Patient was treated on medical floor with:     1. Zoloft 150 mg daily  2. Remeron 15 mg at bedtime   3. Xanax 0.5 mg once daily for Anxiety   4. Seroquel 25 mg twice daily for paranoia.     On interview today, pt says she "had some confusion" but unable to explicate ("so many things going on"), states she had a sense of vertigo as well . She then goes on to say the staff at the facility are not nice to her and she doesn't "feel safe," she says they are all out to get her money, it's a "big scam," she bemoans "the world the way it is now," and that the pandemic occurred shortly after she moved in there. She says because she wears a pendant around her neck, the staff there "are bothered."   she complains that her daughter is selling her house, and that I am the fifth psychiatrist she has spoken to in the past week, saying "it's an invasion of privacy," and asking at one point if she should have her  present in order to continue the interview. She denies psychiatric sx's ("I am a happy person,") denies SI/HI/AVH. She is oriented to self and situation, believes she is in [facility], and that the date is august 20.  see  note for collateral from facility.    Patient was seen in unit, was upset, annoyed, and with limited cooperation, " I don't belong here ", " I want to go back to my house to enjoy my backyard and space, I'm out of [facility] ". When asked do you live at [facility] she answered " I don't live there anymore ", I'm finished at [facility], only old people lives there" . 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 Stenosis, need assistance with shower, but can eat by herself and has not driven cars in a year. She has fair sleep/appetite. She does not believe that she has any memory issues, but due to old age, she may have some memory issues. I am not depressed, but sad by the way the world is treating me ".    Plan: Admit Involuntarily         Start Seroquel 25 mg BID         Zoloft 150 mg daily         Remeron 15 mg HS         Xanax 0.5 mg daily         Collateral info from [facility]

## 2020-08-07 NOTE — ED ADULT NURSE REASSESSMENT NOTE - NS ED NURSE REASSESS COMMENT FT1
pt aaox2, pt resting comfortably on stretcher, vss, afebrile, swabbed pt for covid, awaiting for update from psych.

## 2020-08-07 NOTE — ED ADULT NURSE REASSESSMENT NOTE - NS ED NURSE REASSESS COMMENT FT1
Assumed care of pt at this time from abbey orozco, safety maintained, respirations unlabored, will continue to monitor.

## 2020-08-07 NOTE — ED BEHAVIORAL HEALTH ASSESSMENT NOTE - OTHER
dementia (likely), paranoia UTI; losing function; selling of house [place] Records Checked: [facility], [facility], [facility], [facility], [facility], [facility], [facility], [facility], [facility] ED, [facility], [facility], [facility], [facility] E[facility]A, [facility], [facility], [facility], [facility], [facility], [facility], [facility], [facility], Social Media (For example - Facebook, [employer], [facility]), Web search, [employer] did not ambulate - in bed [address]

## 2020-08-08 LAB
A1C WITH ESTIMATED AVERAGE GLUCOSE RESULT: 5.4 % — SIGNIFICANT CHANGE UP (ref 4–5.6)
CHOLEST SERPL-MCNC: 154 MG/DL — SIGNIFICANT CHANGE UP (ref 10–199)
CULTURE RESULTS: SIGNIFICANT CHANGE UP
ESTIMATED AVERAGE GLUCOSE: 108 MG/DL — SIGNIFICANT CHANGE UP (ref 68–114)
HDLC SERPL-MCNC: 44 MG/DL — LOW
LIPID PNL WITH DIRECT LDL SERPL: 93 MG/DL — SIGNIFICANT CHANGE UP
SPECIMEN SOURCE: SIGNIFICANT CHANGE UP
TOTAL CHOLESTEROL/HDL RATIO MEASUREMENT: 3.5 RATIO — SIGNIFICANT CHANGE UP (ref 3.3–7.1)
TRIGL SERPL-MCNC: 83 MG/DL — SIGNIFICANT CHANGE UP (ref 10–149)
TSH SERPL-MCNC: 1.65 UU/ML — SIGNIFICANT CHANGE UP (ref 0.34–4.82)

## 2020-08-08 PROCEDURE — 99222 1ST HOSP IP/OBS MODERATE 55: CPT

## 2020-08-08 PROCEDURE — 99231 SBSQ HOSP IP/OBS SF/LOW 25: CPT

## 2020-08-08 RX ORDER — HALOPERIDOL DECANOATE 100 MG/ML
0.5 INJECTION INTRAMUSCULAR ONCE
Refills: 0 | Status: COMPLETED | OUTPATIENT
Start: 2020-08-08 | End: 2020-08-08

## 2020-08-08 RX ORDER — ACETAMINOPHEN 500 MG
650 TABLET ORAL EVERY 6 HOURS
Refills: 0 | Status: DISCONTINUED | OUTPATIENT
Start: 2020-08-08 | End: 2020-08-21

## 2020-08-08 RX ADMIN — QUETIAPINE FUMARATE 25 MILLIGRAM(S): 200 TABLET, FILM COATED ORAL at 09:49

## 2020-08-08 RX ADMIN — Medication 0.5 MILLIGRAM(S): at 09:49

## 2020-08-08 RX ADMIN — SERTRALINE 150 MILLIGRAM(S): 25 TABLET, FILM COATED ORAL at 09:49

## 2020-08-08 RX ADMIN — FAMOTIDINE 20 MILLIGRAM(S): 10 INJECTION INTRAVENOUS at 09:51

## 2020-08-08 RX ADMIN — AMLODIPINE BESYLATE 2.5 MILLIGRAM(S): 2.5 TABLET ORAL at 09:49

## 2020-08-08 RX ADMIN — Medication 25 MILLIGRAM(S): at 09:49

## 2020-08-08 RX ADMIN — HALOPERIDOL DECANOATE 0.5 MILLIGRAM(S): 100 INJECTION INTRAMUSCULAR at 22:28
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
## 2020-08-08 NOTE — PROGRESS NOTE BEHAVIORAL HEALTH - SUMMARY
Patient is an 78yo  female, , domiciled at [facility], with past psych hx of depression and anxiety, no prior psychiatric hospitalizations, with history of suicidal statement but no prior self-injurious behaviors or suicide attempt, was referred by [facility] and brought in by EMS for paranoia with agitation.   Patient recently was admitted to medicine for UTI and followed by [facility] psychiatry, discharged on 7/30, for uti, pt has been more paranoid as of late, believing people at [facility] are against her, and that her daughter is conspiring against her as well by selling her home.     Patient currently upset, annoyed and irritated, endorsing that she does not belong here and to go back to her home. She is paranoid that her daughters are selling her house.    Plan: Admit Involuntarily          Start meds for stability/safety          Collateral info          H&P from Medicine

## 2020-08-08 NOTE — PROGRESS NOTE BEHAVIORAL HEALTH - NSBHFUPINTERVALHXFT_PSY_A_CORE
Patient is an 80y/o  female, , domiciled at [facility], with past psych hx of depression and anxiety, no prior psychiatric hospitalizations, with history of suicidal statement but no prior self-injurious behaviors or suicide attempt, was referred by [place] and brought in by EMS for paranoia with agitation.    Patient recently was admitted to Medicine for UTI and followed by [facility] c/l psychiatry, discharged on 7/30, for UTI pt has been more paranoid as of late, believing people at Assisted Living Facility are against her, and that her daughter is conspiring against her as well by selling her home.   Patient was treated on medical floor with:     1. Zoloft 150 mg daily  2. Remeron 15 mg at bedtime   3. Xanax 0.5 mg once daily for Anxiety   4. Seroquel 25 mg twice daily for paranoia.     On interview today, pt says she "had some confusion" but unable to explicate ("so many things going on"), states she had a sense of vertigo as well . She then goes on to say the staff at the facility are not nice to her and she doesn't "feel safe," she says they are all out to get her money, it's a "big scam," she bemoans "the world the way it is now," and that the pandemic occurred shortly after she moved in there. She says because she wears a pendant around her neck, the staff there "are bothered."   she complains that her daughter is selling her house, and that I am the fifth psychiatrist she has spoken to in the past week, saying "it's an invasion of privacy," and asking at one point if she should have her  present in order to continue the interview. She denies psychiatric sx's ("I am a happy person,") denies SI/HI/AVH. She is oriented to self and situation, believes she is in [facility], and that the date is august 20.  see  note for collateral from facility.    Patient was seen in unit, was upset, annoyed, and with limited cooperation, " I don't belong here ", " I want to go back to my house to enjoy my backyard and space, I'm out of [place] ". When asked do you live at [place] she answered " I don't live there anymore ", I'm finished at [place], only old people lives there" . 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 Stenosis, need assistance with shower, but can eat by herself and has not driven cars in a year. She has fair sleep/appetite. She does not believe that she has any memory issues, but due to old age, she may have some memory issues. I am not depressed, but sad by the way the world is treating me ".    Plan: Admit Involuntarily         Start Seroquel 25 mg BID         Zoloft 150 mg daily         Remeron 15 mg HS         Xanax 0.5 mg daily         Collateral info from [place]    08/08/2020: Patient was seen in the hallway with her 1:1 , she continues to endorse that she is not going back to [place] as she came out of that and does not belong there. She mentions that we can discuss with her daughter and find out why she has to go to [place]. She endorses that she is trying her best, the world is coping. Her daughter is trying hard to make her home user friendly. No other acute issues except going back to home.

## 2020-08-09 LAB
SARS-COV-2 IGG SERPL QL IA: NEGATIVE — SIGNIFICANT CHANGE UP
SARS-COV-2 IGM SERPL IA-ACNC: <0.1 INDEX — SIGNIFICANT CHANGE UP

## 2020-08-09 PROCEDURE — 99231 SBSQ HOSP IP/OBS SF/LOW 25: CPT

## 2020-08-09 RX ORDER — HALOPERIDOL DECANOATE 100 MG/ML
0.5 INJECTION INTRAMUSCULAR EVERY 8 HOURS
Refills: 0 | Status: DISCONTINUED | OUTPATIENT
Start: 2020-08-09 | End: 2020-08-10

## 2020-08-09 RX ORDER — HALOPERIDOL DECANOATE 100 MG/ML
0.5 INJECTION INTRAMUSCULAR EVERY 8 HOURS
Refills: 0 | Status: DISCONTINUED | OUTPATIENT
Start: 2020-08-09 | End: 2020-08-20

## 2020-08-09 RX ADMIN — QUETIAPINE FUMARATE 25 MILLIGRAM(S): 200 TABLET, FILM COATED ORAL at 09:06

## 2020-08-09 RX ADMIN — AMLODIPINE BESYLATE 2.5 MILLIGRAM(S): 2.5 TABLET ORAL at 09:05

## 2020-08-09 RX ADMIN — MIRTAZAPINE 15 MILLIGRAM(S): 45 TABLET, ORALLY DISINTEGRATING ORAL at 21:00

## 2020-08-09 RX ADMIN — SERTRALINE 150 MILLIGRAM(S): 25 TABLET, FILM COATED ORAL at 09:06

## 2020-08-09 RX ADMIN — Medication 2 SPRAY(S): at 20:59

## 2020-08-09 RX ADMIN — Medication 0.5 MILLIGRAM(S): at 09:00

## 2020-08-09 RX ADMIN — FAMOTIDINE 20 MILLIGRAM(S): 10 INJECTION INTRAVENOUS at 09:05

## 2020-08-09 RX ADMIN — ATORVASTATIN CALCIUM 40 MILLIGRAM(S): 80 TABLET, FILM COATED ORAL at 20:59

## 2020-08-09 RX ADMIN — Medication 25 MILLIGRAM(S): at 09:06

## 2020-08-09 RX ADMIN — QUETIAPINE FUMARATE 25 MILLIGRAM(S): 200 TABLET, FILM COATED ORAL at 20:59

## 2020-08-09 RX ADMIN — Medication 25 MILLIGRAM(S): at 21:00

## 2020-08-09 NOTE — PROGRESS NOTE BEHAVIORAL HEALTH - SUMMARY
Patient is an 78yo  female, , domiciled at [facility], with past psych hx of depression and anxiety, no prior psychiatric hospitalizations, with history of suicidal statement but no prior self-injurious behaviors or suicide attempt, was referred by [facility] and brought in by EMS for paranoia with agitation.   Patient recently was admitted to medicine for UTI and followed by [facility] psychiatry, discharged on 7/30, for uti, pt has been more paranoid as of late, believing people at [facility] are against her, and that her daughter is conspiring against her as well by selling her home.     Patient currently upset, annoyed and irritated, endorsing that she does not belong here and to go back to her home. She is paranoid that her daughters are selling her house.    Plan: Admit Involuntarily          Start meds for stability/safety          Collateral info          H&P from Medicine
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
## 2020-08-09 NOTE — PROGRESS NOTE BEHAVIORAL HEALTH - NSBHCHARTREVIEWIMAGING_PSY_A_CORE FT
< from: CT Head No Cont (08.07.20 @ 04:38) >    EXAM:  CT BRAIN                            PROCEDURE DATE:  08/07/2020          INTERPRETATION:  CT HEAD WITHOUT CONTRAST    INDICATION: 79 years old. Female. AMS.    COMPARISON: 7/26/2020.    TECHNIQUE: Noncontrast axial CT head was obtained from the skull base to vertex.    FINDINGS:  No acute intracranial hemorrhage, mass effect or midline shift.  No CT evidence of acute large territory vascular infarct.  The ventricles and cortical sulci are prominent reflecting parenchymal volume loss. Again seen is slightly disproportionate enlargement of the ventricles, which may be seen in normal pressure hydrocephalus in the appropriate clinical setting.  Scattered hypodensities in the periventricular white matter are nonspecific, but likely sequela of small vessel ischemic disease.    The visualized paranasal sinuses and mastoid air cells are well aerated. The native ocular lenses are surgically absent.  No displaced calvarial fracture.    IMPRESSION:  No acute intracranial hemorrhage or mass effect.              RISSA FERRIS M.D., ATTENDING RADIOLOGIST  This document has been electronically signed. Aug  7 2020  4:45AM

## 2020-08-10 DIAGNOSIS — F13.231 SEDATIVE, HYPNOTIC OR ANXIOLYTIC DEPENDENCE WITH WITHDRAWAL DELIRIUM: ICD-10-CM

## 2020-08-10 PROCEDURE — 99232 SBSQ HOSP IP/OBS MODERATE 35: CPT

## 2020-08-10 RX ORDER — RISPERIDONE 4 MG/1
0.25 TABLET ORAL DAILY
Refills: 0 | Status: DISCONTINUED | OUTPATIENT
Start: 2020-08-10 | End: 2020-08-13

## 2020-08-10 RX ORDER — RISPERIDONE 4 MG/1
0.5 TABLET ORAL AT BEDTIME
Refills: 0 | Status: DISCONTINUED | OUTPATIENT
Start: 2020-08-10 | End: 2020-08-21

## 2020-08-10 RX ADMIN — RISPERIDONE 0.25 MILLIGRAM(S): 4 TABLET ORAL at 09:53

## 2020-08-10 RX ADMIN — Medication 0.5 MILLIGRAM(S): at 09:33

## 2020-08-10 RX ADMIN — AMLODIPINE BESYLATE 2.5 MILLIGRAM(S): 2.5 TABLET ORAL at 09:33

## 2020-08-10 RX ADMIN — SERTRALINE 150 MILLIGRAM(S): 25 TABLET, FILM COATED ORAL at 09:33

## 2020-08-10 RX ADMIN — FAMOTIDINE 20 MILLIGRAM(S): 10 INJECTION INTRAVENOUS at 09:33

## 2020-08-10 RX ADMIN — Medication 25 MILLIGRAM(S): at 21:26

## 2020-08-10 RX ADMIN — MIRTAZAPINE 15 MILLIGRAM(S): 45 TABLET, ORALLY DISINTEGRATING ORAL at 21:26

## 2020-08-10 RX ADMIN — Medication 25 MILLIGRAM(S): at 09:33

## 2020-08-10 RX ADMIN — Medication 650 MILLIGRAM(S): at 04:21

## 2020-08-10 RX ADMIN — ATORVASTATIN CALCIUM 40 MILLIGRAM(S): 80 TABLET, FILM COATED ORAL at 21:26

## 2020-08-10 RX ADMIN — RISPERIDONE 0.5 MILLIGRAM(S): 4 TABLET ORAL at 21:26

## 2020-08-10 RX ADMIN — Medication 2 SPRAY(S): at 21:27

## 2020-08-10 NOTE — PHYSICAL THERAPY INITIAL EVALUATION ADULT - ADDITIONAL COMMENTS
Pt states she pivot transfers from bed to wheelchair with supervision.  Pt states she has been functionally declining during quarantine at [facility].

## 2020-08-10 NOTE — PHYSICAL THERAPY INITIAL EVALUATION ADULT - PERTINENT HX OF CURRENT PROBLEM, REHAB EVAL
80y/o  female, , domiciled at [facility], with past psych hx of depression and anxiety, no prior psychiatric hospitalizations, with history of suicidal statement but no prior self-injurious behaviors or suicide attempt, was referred by [facility] and brought in by EMS for paranoia with agitation.
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
## 2020-08-10 NOTE — PHYSICAL THERAPY INITIAL EVALUATION ADULT - LEVEL OF INDEPENDENCE: GAIT, REHAB EVAL
c/o lower abd pain x 2 days
moderate assist (50% patients effort)/minimum assist (75% patients effort)

## 2020-08-10 NOTE — PROGRESS NOTE BEHAVIORAL HEALTH - RISK ASSESSMENT
low risk  Acute : pt preoccupied with own thoughts. , anxiety, suspicious, impaired judgment    mitigating: willing to take medication in the hospital   protective: family support   chronic: misuse of medications, possible over use of sedative hypnotics low risk  Acute : Less preoccupied with own thoughts. ,  decreased anxiety, suspicious, impaired judgment    mitigating: willing to take medication in the hospital   protective: family support   chronic: misuse of medications, possible over use of sedative hypnotics

## 2020-08-10 NOTE — PHYSICAL THERAPY INITIAL EVALUATION ADULT - RANGE OF MOTION EXAMINATION, REHAB EVAL
arthritic changes to b/l knee with left knee valgus/bilateral upper extremity ROM was WFL (within functional limits)/bilateral lower extremity ROM was WFL (within functional limits)

## 2020-08-10 NOTE — PROGRESS NOTE BEHAVIORAL HEALTH - NSBHFUPINTERVALHXFT_PSY_A_CORE
Pt was able to sleep but still with mid day sedation. Pt also with some increased irritablity in the evening.  Will change the main neuroleptic to Risperdal for less daytime sedation.  Started dose at Risperdal 0.25mg and .5mg po hs.    Pt still with some suspicious  thoughts about the intention of those at the assisted living.   Pt to have evaluation from PT for balance and gailt and if the walker can be used

## 2020-08-10 NOTE — PROGRESS NOTE BEHAVIORAL HEALTH - SUMMARY
Patient is an 78yo  female, , domiciled at [facility], with past psych hx of depression and anxiety, no prior psychiatric hospitalizations, with history of suicidal statement but no prior self-injurious behaviors or suicide attempt, was referred by [facility] and brought in by EMS for paranoia with agitation.   pt presents with increasing paranoid ideas and agitation at her facility. she is confused on interview. no evidence of suicidality. collateral from facility at this point in time is limited, pt should be held pending more detailed collateral from facility and family in order to formulate a plan for treatment of what appears to be worsening dementia with paranoia/behavioral disturbance.    RE-EVAL: 8.7.2020 - Patient symptoms presenting as an acute risk for harm to self / others secondary to paranoia, poor insight and judgment, ?in the setting of ?dementia. As per Dr. Moon, and daughter, patient does not have a diagnosis of Dementia. Hence. Patient presenting indicating a psychosis (paranoia). Patient's paranoia also exacerbating her Anxiety. Patient has recent active depressive symptoms secondary to being in [place]. Patient has behavioral disturbance due to paranoia, of which would benefit from and requiring in-patient hospitalization for safety and stabilization. Patient is an 78yo  female, , domiciled at [facility], with past psych hx of depression and anxiety, no prior psychiatric hospitalizations, with history of suicidal statement but no prior self-injurious behaviors or suicide attempt, was referred by [facility] and brought in by EMS for paranoia with agitation.   pt presents with increasing paranoid ideas and agitation at her facility. she is confused on interview. no evidence of suicidality. collateral from facility at this point in time is limited, pt should be held pending more detailed collateral from facility and family in order to formulate a plan for treatment of what appears to be worsening dementia with paranoia/behavioral disturbance.    RE-EVAL: 8.7.2020 - Patient symptoms presenting as an acute risk for harm to self / others secondary to paranoia, poor insight and judgment, ?in the setting of ?dementia. As per Dr. Moon, and daughter, patient does not have a diagnosis of Dementia. Hence. Patient presenting indicating a psychosis (paranoia). Patient's paranoia also exacerbating her Anxiety. Patient has recent active depressive symptoms secondary to being in [facility]. Patient has behavioral disturbance due to paranoia, of which would benefit from and requiring in-patient hospitalization for safety and stabilization.    Hospitalization course:   8/10/2020  Pt with cc of some sedation in the day. Decreasing  suspicious guardedness. Possible that pt with paranoid thoughts from xanax withdrawal.

## 2020-08-11 PROCEDURE — 99232 SBSQ HOSP IP/OBS MODERATE 35: CPT

## 2020-08-11 RX ORDER — MIRTAZAPINE 45 MG/1
30 TABLET, ORALLY DISINTEGRATING ORAL AT BEDTIME
Refills: 0 | Status: DISCONTINUED | OUTPATIENT
Start: 2020-08-11 | End: 2020-08-12

## 2020-08-11 RX ORDER — SERTRALINE 25 MG/1
50 TABLET, FILM COATED ORAL DAILY
Refills: 0 | Status: DISCONTINUED | OUTPATIENT
Start: 2020-08-12 | End: 2020-08-13

## 2020-08-11 RX ADMIN — RISPERIDONE 0.25 MILLIGRAM(S): 4 TABLET ORAL at 09:20

## 2020-08-11 RX ADMIN — Medication 0.5 MILLIGRAM(S): at 09:20

## 2020-08-11 RX ADMIN — MIRTAZAPINE 30 MILLIGRAM(S): 45 TABLET, ORALLY DISINTEGRATING ORAL at 21:33

## 2020-08-11 RX ADMIN — ATORVASTATIN CALCIUM 40 MILLIGRAM(S): 80 TABLET, FILM COATED ORAL at 21:32

## 2020-08-11 RX ADMIN — Medication 25 MILLIGRAM(S): at 09:20

## 2020-08-11 RX ADMIN — FAMOTIDINE 20 MILLIGRAM(S): 10 INJECTION INTRAVENOUS at 09:20

## 2020-08-11 RX ADMIN — Medication 2 SPRAY(S): at 21:32

## 2020-08-11 RX ADMIN — AMLODIPINE BESYLATE 2.5 MILLIGRAM(S): 2.5 TABLET ORAL at 09:20

## 2020-08-11 RX ADMIN — Medication 25 MILLIGRAM(S): at 21:31

## 2020-08-11 RX ADMIN — RISPERIDONE 0.5 MILLIGRAM(S): 4 TABLET ORAL at 21:33

## 2020-08-11 RX ADMIN — SERTRALINE 150 MILLIGRAM(S): 25 TABLET, FILM COATED ORAL at 09:20

## 2020-08-11 NOTE — PROGRESS NOTE BEHAVIORAL HEALTH - NSBHFUPINTERVALHXFT_PSY_A_CORE
Pt resting in bed claimed that she was "nauseous" but 1:1 indicated that pt with good appetite and ate a big breakfast.. PT came yesterday and able to get pt up and needing exercise but pt now claims " I couldn't walk?" but no sign of atrophy and pt able to get up out of bed. Pt resting in bed claimed that she was "nauseous" but 1:1 indicated that pt with good appetite and ate a big breakfast.. PT came yesterday and able to get pt up and needing exercise but pt now claims " I couldn't walk?" but no sign of atrophy and pt able to get up out of bed.  Also pt with multiple healed areas of skin tears and one on the left leg, medial side and above the ankle is swollen and red. Will get hospitalist to see.   Pt indicating that she is not wanting to return to previous assisted living. Pt still  a little suspicious  of the intent of people who reside there. Claims to still be feeling a little down.  Will increase the Remeron and possibly may need to adjust the Risperdal

## 2020-08-11 NOTE — PROGRESS NOTE BEHAVIORAL HEALTH - SUMMARY
Patient is an 80yo  female, , domiciled at [facility], with past psych hx of depression and anxiety, no prior psychiatric hospitalizations, with history of suicidal statement but no prior self-injurious behaviors or suicide attempt, was referred by [place] and brought in by EMS for paranoia with agitation.   pt presents with increasing paranoid ideas and agitation at her facility. she is confused on interview. no evidence of suicidality. collateral from facility at this point in time is limited, pt should be held pending more detailed collateral from facility and family in order to formulate a plan for treatment of what appears to be worsening dementia with paranoia/behavioral disturbance.    RE-EVAL: 8.7.2020 - Patient symptoms presenting as an acute risk for harm to self / others secondary to paranoia, poor insight and judgment, ?in the setting of ?dementia. As per Dr. Moon, and daughter, patient does not have a diagnosis of Dementia. Hence. Patient presenting indicating a psychosis (paranoia). Patient's paranoia also exacerbating her Anxiety. Patient has recent active depressive symptoms secondary to being in [place]. Patient has behavioral disturbance due to paranoia, of which would benefit from and requiring in-patient hospitalization for safety and stabilization. Patient is an 80yo  female, , domiciled at [facility], with past psych hx of depression and anxiety, no prior psychiatric hospitalizations, with history of suicidal statement but no prior self-injurious behaviors or suicide attempt, was referred by [place] and brought in by EMS for paranoia with agitation.   pt presents with increasing paranoid ideas and agitation at her facility. she is confused on interview. no evidence of suicidality. collateral from facility at this point in time is limited, pt should be held pending more detailed collateral from facility and family in order to formulate a plan for treatment of what appears to be worsening dementia with paranoia/behavioral disturbance.    RE-EVAL: 8.7.2020 - Patient symptoms presenting as an acute risk for harm to self / others secondary to paranoia, poor insight and judgment, ?in the setting of ?dementia. As per Dr. Moon, and daughter, patient does not have a diagnosis of Dementia. Hence. Patient presenting indicating a psychosis (paranoia). Patient's paranoia also exacerbating her Anxiety. Patient has recent active depressive symptoms secondary to being in [place]. Patient has behavioral disturbance due to paranoia, of which would benefit from and requiring in-patient hospitalization for safety and stabilization.    Hospitalization course:  8/11/2020 Pt with decreasing anxiety , still able to sleep , and appetite is good  Readjust the remeron and then the Risperdal is needed for suspicious thoughts.

## 2020-08-11 NOTE — PROGRESS NOTE BEHAVIORAL HEALTH - RISK ASSESSMENT
low risk  Acute : Less preoccupied with own thoughts. ,  decreased anxiety, still suspicious, unrealistic thoughts  mitigating: willing to take medication in the hospital   protective: family support   chronic: misuse of medications, possible over use of sedative hypnotics

## 2020-08-12 DIAGNOSIS — F03.91 UNSPECIFIED DEMENTIA WITH BEHAVIORAL DISTURBANCE: ICD-10-CM

## 2020-08-12 PROCEDURE — 99232 SBSQ HOSP IP/OBS MODERATE 35: CPT

## 2020-08-12 RX ORDER — LANOLIN ALCOHOL/MO/W.PET/CERES
5 CREAM (GRAM) TOPICAL AT BEDTIME
Refills: 0 | Status: DISCONTINUED | OUTPATIENT
Start: 2020-08-12 | End: 2020-08-21

## 2020-08-12 RX ORDER — MIRTAZAPINE 45 MG/1
45 TABLET, ORALLY DISINTEGRATING ORAL AT BEDTIME
Refills: 0 | Status: DISCONTINUED | OUTPATIENT
Start: 2020-08-12 | End: 2020-08-21

## 2020-08-12 RX ADMIN — Medication 25 MILLIGRAM(S): at 09:44

## 2020-08-12 RX ADMIN — RISPERIDONE 0.5 MILLIGRAM(S): 4 TABLET ORAL at 20:39

## 2020-08-12 RX ADMIN — ATORVASTATIN CALCIUM 40 MILLIGRAM(S): 80 TABLET, FILM COATED ORAL at 20:39

## 2020-08-12 RX ADMIN — Medication 5 MILLIGRAM(S): at 20:39

## 2020-08-12 RX ADMIN — Medication 0.5 MILLIGRAM(S): at 09:44

## 2020-08-12 RX ADMIN — Medication 2 SPRAY(S): at 20:39

## 2020-08-12 RX ADMIN — SERTRALINE 50 MILLIGRAM(S): 25 TABLET, FILM COATED ORAL at 09:45

## 2020-08-12 RX ADMIN — RISPERIDONE 0.25 MILLIGRAM(S): 4 TABLET ORAL at 09:44

## 2020-08-12 RX ADMIN — AMLODIPINE BESYLATE 2.5 MILLIGRAM(S): 2.5 TABLET ORAL at 09:45

## 2020-08-12 RX ADMIN — FAMOTIDINE 20 MILLIGRAM(S): 10 INJECTION INTRAVENOUS at 09:44

## 2020-08-12 RX ADMIN — MIRTAZAPINE 45 MILLIGRAM(S): 45 TABLET, ORALLY DISINTEGRATING ORAL at 20:39

## 2020-08-12 NOTE — PROGRESS NOTE BEHAVIORAL HEALTH - SUMMARY
Patient is an 80yo  female, , domiciled at [facility], with past psych hx of depression and anxiety, no prior psychiatric hospitalizations, with history of suicidal statement but no prior self-injurious behaviors or suicide attempt, was referred by [place] and brought in by EMS for paranoia with agitation.   pt presents with increasing paranoid ideas and agitation at her facility. she is confused on interview. no evidence of suicidality. collateral from facility at this point in time is limited, pt should be held pending more detailed collateral from facility and family in order to formulate a plan for treatment of what appears to be worsening dementia with paranoia/behavioral disturbance.    RE-EVAL: 8.7.2020 - Patient symptoms presenting as an acute risk for harm to self / others secondary to paranoia, poor insight and judgment, ?in the setting of ?dementia. As per Dr. Moon, and daughter, patient does not have a diagnosis of Dementia. Hence. Patient presenting indicating a psychosis (paranoia). Patient's paranoia also exacerbating her Anxiety. Patient has recent active depressive symptoms secondary to being in [place]. Patient has behavioral disturbance due to paranoia, of which would benefit from and requiring in-patient hospitalization for safety and stabilization.    Hospitalization course:  8/11/2020 Pt with decreasing anxiety , still able to sleep , and appetite is good  Readjust the remeron and then the Risperdal is needed for suspicious thoughts.

## 2020-08-12 NOTE — PROGRESS NOTE BEHAVIORAL HEALTH - PROBLEM SELECTOR PLAN 3
continue with xanax 0.5mg po daily  increase remeron continue with xanax 0.5mg po daily  zoloft 50mg daily

## 2020-08-12 NOTE — PROGRESS NOTE BEHAVIORAL HEALTH - NSBHFUPINTERVALHXFT_PSY_A_CORE
Pt is angry that Xenia  her daughter renovated the house "and didn't  consult and discuss even the color of the furniture that was put in to my home."   Pt seems to like her other daughter better .  Pt now wanting to go for  physical rehab  instead of directly  to home. Pt wanting to get help to increase ambulation  . Pt claiming to still be depressed in mood but denies any suicidal ideation intent or plan .  Pt claiming that she is not having any hallucination, and no delusions elicited.

## 2020-08-12 NOTE — PROGRESS NOTE BEHAVIORAL HEALTH - SUMMARY
Patient is an 80yo  female, , domiciled at [facility], with past psych hx of depression and anxiety, no prior psychiatric hospitalizations, with history of suicidal statement but no prior self-injurious behaviors or suicide attempt, was referred by [place] and brought in by EMS for paranoia with agitation.   pt presents with increasing paranoid ideas and agitation at her facility. she is confused on interview. no evidence of suicidality. collateral from facility at this point in time is limited, pt should be held pending more detailed collateral from facility and family in order to formulate a plan for treatment of what appears to be worsening dementia with paranoia/behavioral disturbance.    RE-EVAL: 8.7.2020 - Patient symptoms presenting as an acute risk for harm to self / others secondary to paranoia, poor insight and judgment, ?in the setting of ?dementia. As per Dr. Moon, and daughter, patient does not have a diagnosis of Dementia. Hence. Patient presenting indicating a psychosis (paranoia). Patient's paranoia also exacerbating her Anxiety. Patient has recent active depressive symptoms secondary to being in [place]. Patient has behavioral disturbance due to paranoia, of which would benefit from and requiring in-patient hospitalization for safety and stabilization.    Hospitalization course:  8/11/2020 Pt with decreasing anxiety , still able to sleep , and appetite is good  Readjust the remeron and then the Risperdal is needed for suspicious thoughts. Patient is an 80yo  female, , domiciled at [facility], with past psych hx of depression and anxiety, no prior psychiatric hospitalizations, with history of suicidal statement but no prior self-injurious behaviors or suicide attempt, was referred by [place] and brought in by EMS for paranoia with agitation.   pt presents with increasing paranoid ideas and agitation at her facility. she is confused on interview. no evidence of suicidality. collateral from facility at this point in time is limited, pt should be held pending more detailed collateral from facility and family in order to formulate a plan for treatment of what appears to be worsening dementia with paranoia/behavioral disturbance.    RE-EVAL: 8.7.2020 - Patient symptoms presenting as an acute risk for harm to self / others secondary to paranoia, poor insight and judgment, ?in the setting of ?dementia. As per Dr. Moon, and daughter, patient does not have a diagnosis of Dementia. Hence. Patient presenting indicating a psychosis (paranoia). Patient's paranoia also exacerbating her Anxiety. Patient has recent active depressive symptoms secondary to being in [place]. Patient has behavioral disturbance due to paranoia, of which would benefit from and requiring in-patient hospitalization for safety and stabilization.    Hospitalization course:  8/12/2020 Pt is forgetful often forgeting conversations and then becomes paranoid and accusatory   8/11/2020 Pt with decreasing anxiety , still able to sleep , and appetite is good  d/c the remeron and then the Risperdal is needed for suspicious thoughts. continue with the zoloft

## 2020-08-12 NOTE — PROGRESS NOTE BEHAVIORAL HEALTH - NSBHFUPINTERVALHXFT_PSY_A_CORE
Met with the daughter Maryse and the pt and the pt was accusing her daughter for lying to her and not involving her in the planning of the house renovations.  Pt was irritable and forgetful. Pt in fact was also forgetting most of what I had discussed with her about her concerns as to where to live and if she preferred PT at home or to go for rehab. Pt instead of admitting to forgetting she was accusatory and initially claimed that I "never spoke " to her about anything. Pt would not even consider the possibility that her memory was faulty and would instead become angry and verbally aggressive as she was believing she was being taken advantage of.  Pt is so far comfortable here as there are less demands from her .

## 2020-08-13 LAB
ANION GAP SERPL CALC-SCNC: 8 MMOL/L — SIGNIFICANT CHANGE UP (ref 5–17)
BASOPHILS # BLD AUTO: 0.04 K/UL — SIGNIFICANT CHANGE UP (ref 0–0.2)
BASOPHILS NFR BLD AUTO: 0.4 % — SIGNIFICANT CHANGE UP (ref 0–2)
BUN SERPL-MCNC: 36 MG/DL — HIGH (ref 7–23)
CALCIUM SERPL-MCNC: 9.5 MG/DL — SIGNIFICANT CHANGE UP (ref 8.5–10.1)
CHLORIDE SERPL-SCNC: 108 MMOL/L — SIGNIFICANT CHANGE UP (ref 96–108)
CO2 SERPL-SCNC: 24 MMOL/L — SIGNIFICANT CHANGE UP (ref 22–31)
CREAT SERPL-MCNC: 1.31 MG/DL — HIGH (ref 0.5–1.3)
EOSINOPHIL # BLD AUTO: 0.75 K/UL — HIGH (ref 0–0.5)
EOSINOPHIL NFR BLD AUTO: 6.7 % — HIGH (ref 0–6)
GLUCOSE SERPL-MCNC: 133 MG/DL — HIGH (ref 70–99)
HCT VFR BLD CALC: 33.6 % — LOW (ref 34.5–45)
HGB BLD-MCNC: 10.5 G/DL — LOW (ref 11.5–15.5)
IMM GRANULOCYTES NFR BLD AUTO: 0.4 % — SIGNIFICANT CHANGE UP (ref 0–1.5)
LYMPHOCYTES # BLD AUTO: 1.76 K/UL — SIGNIFICANT CHANGE UP (ref 1–3.3)
LYMPHOCYTES # BLD AUTO: 15.8 % — SIGNIFICANT CHANGE UP (ref 13–44)
MCHC RBC-ENTMCNC: 31.3 GM/DL — LOW (ref 32–36)
MCHC RBC-ENTMCNC: 31.9 PG — SIGNIFICANT CHANGE UP (ref 27–34)
MCV RBC AUTO: 102.1 FL — HIGH (ref 80–100)
MONOCYTES # BLD AUTO: 0.52 K/UL — SIGNIFICANT CHANGE UP (ref 0–0.9)
MONOCYTES NFR BLD AUTO: 4.7 % — SIGNIFICANT CHANGE UP (ref 2–14)
NEUTROPHILS # BLD AUTO: 8.01 K/UL — HIGH (ref 1.8–7.4)
NEUTROPHILS NFR BLD AUTO: 72 % — SIGNIFICANT CHANGE UP (ref 43–77)
PLATELET # BLD AUTO: 272 K/UL — SIGNIFICANT CHANGE UP (ref 150–400)
POTASSIUM SERPL-MCNC: 3.9 MMOL/L — SIGNIFICANT CHANGE UP (ref 3.5–5.3)
POTASSIUM SERPL-SCNC: 3.9 MMOL/L — SIGNIFICANT CHANGE UP (ref 3.5–5.3)
RBC # BLD: 3.29 M/UL — LOW (ref 3.8–5.2)
RBC # FLD: 12.6 % — SIGNIFICANT CHANGE UP (ref 10.3–14.5)
SODIUM SERPL-SCNC: 140 MMOL/L — SIGNIFICANT CHANGE UP (ref 135–145)
WBC # BLD: 11.12 K/UL — HIGH (ref 3.8–10.5)
WBC # FLD AUTO: 11.12 K/UL — HIGH (ref 3.8–10.5)

## 2020-08-13 PROCEDURE — 99232 SBSQ HOSP IP/OBS MODERATE 35: CPT

## 2020-08-13 RX ORDER — RISPERIDONE 4 MG/1
0.5 TABLET ORAL DAILY
Refills: 0 | Status: DISCONTINUED | OUTPATIENT
Start: 2020-08-13 | End: 2020-08-13

## 2020-08-13 RX ORDER — RISPERIDONE 4 MG/1
0.25 TABLET ORAL DAILY
Refills: 0 | Status: DISCONTINUED | OUTPATIENT
Start: 2020-08-13 | End: 2020-08-21

## 2020-08-13 RX ORDER — SERTRALINE 25 MG/1
25 TABLET, FILM COATED ORAL DAILY
Refills: 0 | Status: COMPLETED | OUTPATIENT
Start: 2020-08-13 | End: 2020-08-14

## 2020-08-13 RX ADMIN — Medication 25 MILLIGRAM(S): at 20:36

## 2020-08-13 RX ADMIN — RISPERIDONE 0.25 MILLIGRAM(S): 4 TABLET ORAL at 10:51

## 2020-08-13 RX ADMIN — Medication 650 MILLIGRAM(S): at 09:15

## 2020-08-13 RX ADMIN — MIRTAZAPINE 45 MILLIGRAM(S): 45 TABLET, ORALLY DISINTEGRATING ORAL at 20:36

## 2020-08-13 RX ADMIN — Medication 650 MILLIGRAM(S): at 08:16

## 2020-08-13 RX ADMIN — Medication 5 MILLIGRAM(S): at 20:36

## 2020-08-13 RX ADMIN — Medication 650 MILLIGRAM(S): at 18:26

## 2020-08-13 RX ADMIN — RISPERIDONE 0.5 MILLIGRAM(S): 4 TABLET ORAL at 20:36

## 2020-08-13 RX ADMIN — AMLODIPINE BESYLATE 2.5 MILLIGRAM(S): 2.5 TABLET ORAL at 10:51

## 2020-08-13 RX ADMIN — Medication 25 MILLIGRAM(S): at 10:51

## 2020-08-13 RX ADMIN — SERTRALINE 25 MILLIGRAM(S): 25 TABLET, FILM COATED ORAL at 10:51

## 2020-08-13 RX ADMIN — Medication 2 SPRAY(S): at 20:35

## 2020-08-13 RX ADMIN — Medication 650 MILLIGRAM(S): at 17:29

## 2020-08-13 RX ADMIN — Medication 0.5 MILLIGRAM(S): at 10:51

## 2020-08-13 RX ADMIN — ATORVASTATIN CALCIUM 40 MILLIGRAM(S): 80 TABLET, FILM COATED ORAL at 20:36

## 2020-08-13 RX ADMIN — FAMOTIDINE 20 MILLIGRAM(S): 10 INJECTION INTRAVENOUS at 10:51

## 2020-08-13 NOTE — PROGRESS NOTE BEHAVIORAL HEALTH - SUMMARY
Patient is an 80yo  female, , domiciled at [facility], with past psych hx of depression and anxiety, no prior psychiatric hospitalizations, with history of suicidal statement but no prior self-injurious behaviors or suicide attempt, was referred by [place] and brought in by EMS for paranoia with agitation.   pt presents with increasing paranoid ideas and agitation at her facility. she is confused on interview. no evidence of suicidality. collateral from facility at this point in time is limited, pt should be held pending more detailed collateral from facility and family in order to formulate a plan for treatment of what appears to be worsening dementia with paranoia/behavioral disturbance.    RE-EVAL: 8.7.2020 - Patient symptoms presenting as an acute risk for harm to self / others secondary to paranoia, poor insight and judgment, ?in the setting of ?dementia. As per Dr. Moon, and daughter, patient does not have a diagnosis of Dementia. Hence. Patient presenting indicating a psychosis (paranoia). Patient's paranoia also exacerbating her Anxiety. Patient has recent active depressive symptoms secondary to being in [place]. Patient has behavioral disturbance due to paranoia, of which would benefit from and requiring in-patient hospitalization for safety and stabilization.    Hospitalization course:  8/13/2020 pt with thoughts of going home if only the aides available. Pt with not wanting to see neurology  8/12/2020 Pt is forgetful often forgeting conversations and then becomes paranoid and accusatory   8/11/2020 Pt with decreasing anxiety , still able to sleep , and appetite is good  d/c the remeron and then the Risperdal is needed for suspicious thoughts. continue with the zoloft

## 2020-08-13 NOTE — PROGRESS NOTE BEHAVIORAL HEALTH - RISK ASSESSMENT
low risk  Acute : Less preoccupied with own thoughts. ,  decreased anxiety, still suspicious, can be redirected for shoert periods of time  mitigating: willing to take medication in the hospital   protective: family support   chronic: misuse of medications, possible over use of sedative hypnotics

## 2020-08-13 NOTE — PROGRESS NOTE BEHAVIORAL HEALTH - NSBHFUPINTERVALHXFT_PSY_A_CORE
Spoke with the pt who is having a good day today.  Pt was introduced to the idea of neurology consult but pt claiming that she was "not really interested"

## 2020-08-14 PROCEDURE — 99232 SBSQ HOSP IP/OBS MODERATE 35: CPT

## 2020-08-14 RX ORDER — ALPRAZOLAM 0.25 MG
0.5 TABLET ORAL DAILY
Refills: 0 | Status: DISCONTINUED | OUTPATIENT
Start: 2020-08-15 | End: 2020-08-18

## 2020-08-14 RX ADMIN — Medication 25 MILLIGRAM(S): at 21:19

## 2020-08-14 RX ADMIN — RISPERIDONE 0.5 MILLIGRAM(S): 4 TABLET ORAL at 21:19

## 2020-08-14 RX ADMIN — MIRTAZAPINE 45 MILLIGRAM(S): 45 TABLET, ORALLY DISINTEGRATING ORAL at 21:19

## 2020-08-14 RX ADMIN — SERTRALINE 25 MILLIGRAM(S): 25 TABLET, FILM COATED ORAL at 09:24

## 2020-08-14 RX ADMIN — AMLODIPINE BESYLATE 2.5 MILLIGRAM(S): 2.5 TABLET ORAL at 09:23

## 2020-08-14 RX ADMIN — RISPERIDONE 0.25 MILLIGRAM(S): 4 TABLET ORAL at 09:24

## 2020-08-14 RX ADMIN — Medication 2 SPRAY(S): at 21:19

## 2020-08-14 RX ADMIN — ATORVASTATIN CALCIUM 40 MILLIGRAM(S): 80 TABLET, FILM COATED ORAL at 21:18

## 2020-08-14 RX ADMIN — FAMOTIDINE 20 MILLIGRAM(S): 10 INJECTION INTRAVENOUS at 09:23

## 2020-08-14 RX ADMIN — Medication 0.5 MILLIGRAM(S): at 09:24

## 2020-08-14 RX ADMIN — Medication 25 MILLIGRAM(S): at 09:23

## 2020-08-14 RX ADMIN — Medication 5 MILLIGRAM(S): at 21:19

## 2020-08-14 NOTE — PROGRESS NOTE BEHAVIORAL HEALTH - SUMMARY
Patient is an 78yo  female, , domiciled at [facility], with past psych hx of depression and anxiety, no prior psychiatric hospitalizations, with history of suicidal statement but no prior self-injurious behaviors or suicide attempt, was referred by [place] and brought in by EMS for paranoia with agitation.   pt presents with increasing paranoid ideas and agitation at her facility. she is confused on interview. no evidence of suicidality. collateral from facility at this point in time is limited, pt should be held pending more detailed collateral from facility and family in order to formulate a plan for treatment of what appears to be worsening dementia with paranoia/behavioral disturbance.    RE-EVAL: 8.7.2020 - Patient symptoms presenting as an acute risk for harm to self / others secondary to paranoia, poor insight and judgment, ?in the setting of ?dementia. As per Dr. Moon, and daughter, patient does not have a diagnosis of Dementia. Hence. Patient presenting indicating a psychosis (paranoia). Patient's paranoia also exacerbating her Anxiety. Patient has recent active depressive symptoms secondary to being in [place]. Patient has behavioral disturbance due to paranoia, of which would benefit from and requiring in-patient hospitalization for safety and stabilization.    Hospitalization course:  8/13/2020 pt with thoughts of going home if only the aides available. Pt with not wanting to see neurology  8/12/2020 Pt is forgetful often forgeting conversations and then becomes paranoid and accusatory   8/11/2020 Pt with decreasing anxiety , still able to sleep , and appetite is good  d/c the remeron and then the Risperdal is needed for suspicious thoughts. continue with the zoloft

## 2020-08-14 NOTE — PROGRESS NOTE BEHAVIORAL HEALTH - RISK ASSESSMENT
low risk  Acute : Less preoccupied with own thoughts. ,  decreased anxiety, still suspicious, can be redirected for shoert periods of time  mitigating: willing to take medication in the hospital , pt with better behavior with redirection  protective: family support   chronic: misuse of medications, possible over use of sedative hypnotics

## 2020-08-14 NOTE — PROGRESS NOTE BEHAVIORAL HEALTH - NSBHFUPINTERVALHXFT_PSY_A_CORE
Pt started out today in a good mood and then at evening was having sun downing effect and was needing redirection Pt with perseveration and forgetfulness.  Pt refused the need for neurology consult "No I really don't want more testing and I'm fine"

## 2020-08-15 RX ADMIN — Medication 2 SPRAY(S): at 21:10

## 2020-08-15 RX ADMIN — ATORVASTATIN CALCIUM 40 MILLIGRAM(S): 80 TABLET, FILM COATED ORAL at 21:09

## 2020-08-15 RX ADMIN — AMLODIPINE BESYLATE 2.5 MILLIGRAM(S): 2.5 TABLET ORAL at 10:17

## 2020-08-15 RX ADMIN — Medication 650 MILLIGRAM(S): at 15:09

## 2020-08-15 RX ADMIN — Medication 5 MILLIGRAM(S): at 21:08

## 2020-08-15 RX ADMIN — Medication 0.5 MILLIGRAM(S): at 10:17

## 2020-08-15 RX ADMIN — RISPERIDONE 0.25 MILLIGRAM(S): 4 TABLET ORAL at 10:17

## 2020-08-15 RX ADMIN — RISPERIDONE 0.5 MILLIGRAM(S): 4 TABLET ORAL at 21:08

## 2020-08-15 RX ADMIN — FAMOTIDINE 20 MILLIGRAM(S): 10 INJECTION INTRAVENOUS at 10:17

## 2020-08-15 RX ADMIN — MIRTAZAPINE 45 MILLIGRAM(S): 45 TABLET, ORALLY DISINTEGRATING ORAL at 21:08

## 2020-08-15 RX ADMIN — SENNA PLUS 2 TABLET(S): 8.6 TABLET ORAL at 21:08

## 2020-08-15 RX ADMIN — Medication 650 MILLIGRAM(S): at 13:27

## 2020-08-15 RX ADMIN — Medication 25 MILLIGRAM(S): at 10:17

## 2020-08-16 RX ADMIN — RISPERIDONE 0.5 MILLIGRAM(S): 4 TABLET ORAL at 21:24

## 2020-08-16 RX ADMIN — Medication 2 SPRAY(S): at 21:24

## 2020-08-16 RX ADMIN — AMLODIPINE BESYLATE 2.5 MILLIGRAM(S): 2.5 TABLET ORAL at 09:35

## 2020-08-16 RX ADMIN — Medication 25 MILLIGRAM(S): at 09:35

## 2020-08-16 RX ADMIN — MIRTAZAPINE 45 MILLIGRAM(S): 45 TABLET, ORALLY DISINTEGRATING ORAL at 21:24

## 2020-08-16 RX ADMIN — Medication 0.5 MILLIGRAM(S): at 09:34

## 2020-08-16 RX ADMIN — Medication 650 MILLIGRAM(S): at 10:26

## 2020-08-16 RX ADMIN — Medication 25 MILLIGRAM(S): at 21:24

## 2020-08-16 RX ADMIN — RISPERIDONE 0.25 MILLIGRAM(S): 4 TABLET ORAL at 09:35

## 2020-08-16 RX ADMIN — ATORVASTATIN CALCIUM 40 MILLIGRAM(S): 80 TABLET, FILM COATED ORAL at 21:24

## 2020-08-16 RX ADMIN — Medication 650 MILLIGRAM(S): at 09:37

## 2020-08-16 RX ADMIN — FAMOTIDINE 20 MILLIGRAM(S): 10 INJECTION INTRAVENOUS at 09:34

## 2020-08-16 RX ADMIN — Medication 5 MILLIGRAM(S): at 21:24

## 2020-08-17 PROCEDURE — 99232 SBSQ HOSP IP/OBS MODERATE 35: CPT

## 2020-08-17 RX ADMIN — ATORVASTATIN CALCIUM 40 MILLIGRAM(S): 80 TABLET, FILM COATED ORAL at 20:09

## 2020-08-17 RX ADMIN — RISPERIDONE 0.25 MILLIGRAM(S): 4 TABLET ORAL at 09:02

## 2020-08-17 RX ADMIN — Medication 2 SPRAY(S): at 20:09

## 2020-08-17 RX ADMIN — RISPERIDONE 0.5 MILLIGRAM(S): 4 TABLET ORAL at 20:09

## 2020-08-17 RX ADMIN — SENNA PLUS 2 TABLET(S): 8.6 TABLET ORAL at 20:09

## 2020-08-17 RX ADMIN — AMLODIPINE BESYLATE 2.5 MILLIGRAM(S): 2.5 TABLET ORAL at 09:02

## 2020-08-17 RX ADMIN — Medication 25 MILLIGRAM(S): at 09:02

## 2020-08-17 RX ADMIN — Medication 5 MILLIGRAM(S): at 20:09

## 2020-08-17 RX ADMIN — MIRTAZAPINE 45 MILLIGRAM(S): 45 TABLET, ORALLY DISINTEGRATING ORAL at 20:09

## 2020-08-17 RX ADMIN — FAMOTIDINE 20 MILLIGRAM(S): 10 INJECTION INTRAVENOUS at 09:02

## 2020-08-17 RX ADMIN — Medication 0.5 MILLIGRAM(S): at 09:02

## 2020-08-17 RX ADMIN — Medication 25 MILLIGRAM(S): at 20:09

## 2020-08-17 NOTE — PROGRESS NOTE BEHAVIORAL HEALTH - NSBHFUPINTERVALHXFT_PSY_A_CORE
Pt with memory problems mostly with recent and immediate. Pt never considering her memory is faulty and will, perseverate, Pt can be redirected for short periods of time.  confabulate and accuse other people of attempting to confuse her. .Pt remains suspicious of the intentions of other people including her family.   Pt would benefit from returning to home to get rehab as she is refusing to attend any new facility for the treatment

## 2020-08-17 NOTE — PROGRESS NOTE BEHAVIORAL HEALTH - RISK ASSESSMENT
low risk  Acute : Less preoccupied with own thoughts. ,  decreased anxiety, still suspicious, can be redirected for shoert periods of time  mitigating: willing to take medication in the hospital , pt with better behavior with redirection  protective: family support   chronic: misuse of medications, possible over use of sedative hypnotics low risk  Acute : Less preoccupied with any negative thoughts about herself. ,  decreased anxiety, still suspicious, can be redirected for short periods of time  mitigating: willing to take medication in the hospital , pt with better behavior with redirection, verbalized willing to return home  protective: family support   chronic: misuse of medications, possible over use of sedative hypnotics

## 2020-08-18 LAB
ANION GAP SERPL CALC-SCNC: 8 MMOL/L — SIGNIFICANT CHANGE UP (ref 5–17)
BUN SERPL-MCNC: 42 MG/DL — HIGH (ref 7–23)
CALCIUM SERPL-MCNC: 9.6 MG/DL — SIGNIFICANT CHANGE UP (ref 8.5–10.1)
CHLORIDE SERPL-SCNC: 108 MMOL/L — SIGNIFICANT CHANGE UP (ref 96–108)
CO2 SERPL-SCNC: 24 MMOL/L — SIGNIFICANT CHANGE UP (ref 22–31)
CREAT SERPL-MCNC: 1.23 MG/DL — SIGNIFICANT CHANGE UP (ref 0.5–1.3)
GLUCOSE SERPL-MCNC: 113 MG/DL — HIGH (ref 70–99)
HCT VFR BLD CALC: 31.6 % — LOW (ref 34.5–45)
HGB BLD-MCNC: 10.3 G/DL — LOW (ref 11.5–15.5)
MCHC RBC-ENTMCNC: 32.2 PG — SIGNIFICANT CHANGE UP (ref 27–34)
MCHC RBC-ENTMCNC: 32.6 GM/DL — SIGNIFICANT CHANGE UP (ref 32–36)
MCV RBC AUTO: 98.8 FL — SIGNIFICANT CHANGE UP (ref 80–100)
PLATELET # BLD AUTO: 297 K/UL — SIGNIFICANT CHANGE UP (ref 150–400)
POTASSIUM SERPL-MCNC: 4.3 MMOL/L — SIGNIFICANT CHANGE UP (ref 3.5–5.3)
POTASSIUM SERPL-SCNC: 4.3 MMOL/L — SIGNIFICANT CHANGE UP (ref 3.5–5.3)
RBC # BLD: 3.2 M/UL — LOW (ref 3.8–5.2)
RBC # FLD: 12.5 % — SIGNIFICANT CHANGE UP (ref 10.3–14.5)
SODIUM SERPL-SCNC: 140 MMOL/L — SIGNIFICANT CHANGE UP (ref 135–145)
WBC # BLD: 10.21 K/UL — SIGNIFICANT CHANGE UP (ref 3.8–10.5)
WBC # FLD AUTO: 10.21 K/UL — SIGNIFICANT CHANGE UP (ref 3.8–10.5)

## 2020-08-18 PROCEDURE — 71045 X-RAY EXAM CHEST 1 VIEW: CPT | Mod: 26

## 2020-08-18 PROCEDURE — 99232 SBSQ HOSP IP/OBS MODERATE 35: CPT

## 2020-08-18 RX ORDER — ALPRAZOLAM 0.25 MG
0.25 TABLET ORAL EVERY 6 HOURS
Refills: 0 | Status: DISCONTINUED | OUTPATIENT
Start: 2020-08-18 | End: 2020-08-20

## 2020-08-18 RX ORDER — ALPRAZOLAM 0.25 MG
0.5 TABLET ORAL AT BEDTIME
Refills: 0 | Status: DISCONTINUED | OUTPATIENT
Start: 2020-08-18 | End: 2020-08-21

## 2020-08-18 RX ADMIN — Medication 0.5 MILLIGRAM(S): at 07:28

## 2020-08-18 RX ADMIN — MIRTAZAPINE 45 MILLIGRAM(S): 45 TABLET, ORALLY DISINTEGRATING ORAL at 20:25

## 2020-08-18 RX ADMIN — ATORVASTATIN CALCIUM 40 MILLIGRAM(S): 80 TABLET, FILM COATED ORAL at 20:26

## 2020-08-18 RX ADMIN — Medication 25 MILLIGRAM(S): at 20:25

## 2020-08-18 RX ADMIN — RISPERIDONE 0.25 MILLIGRAM(S): 4 TABLET ORAL at 07:28

## 2020-08-18 RX ADMIN — SENNA PLUS 2 TABLET(S): 8.6 TABLET ORAL at 20:36

## 2020-08-18 RX ADMIN — Medication 5 MILLIGRAM(S): at 20:25

## 2020-08-18 RX ADMIN — Medication 0.5 MILLIGRAM(S): at 20:25

## 2020-08-18 RX ADMIN — FAMOTIDINE 20 MILLIGRAM(S): 10 INJECTION INTRAVENOUS at 10:32

## 2020-08-18 RX ADMIN — AMLODIPINE BESYLATE 2.5 MILLIGRAM(S): 2.5 TABLET ORAL at 10:32

## 2020-08-18 RX ADMIN — RISPERIDONE 0.5 MILLIGRAM(S): 4 TABLET ORAL at 20:26

## 2020-08-18 RX ADMIN — Medication 25 MILLIGRAM(S): at 10:32

## 2020-08-18 RX ADMIN — Medication 2 SPRAY(S): at 20:24

## 2020-08-18 NOTE — PROGRESS NOTE BEHAVIORAL HEALTH - NSBHFUPINTERVALHXFT_PSY_A_CORE
Pt continues with little change and is still with sequential memory deficits.  Pt still tending to be irritable and needing redirection . Pt is accustomed to being given much attention .  Pt with no attempt to self injure .  Pt can be irritable and verbally aggressive but so far not threatening .    Pt was not able to sleep yesterday and was noted to be more irritable than ususal in the am .  Pt given the am medications of risperdal 0.25mg and xanax 0.5mg earliar and she fell aslepp and when awake has been more redirectable.  Will attempt to readjust the medication to have the xanax in the night to help with the sleep and to use xanax prn at 0.25mg to see if any need for prn .

## 2020-08-18 NOTE — PROGRESS NOTE BEHAVIORAL HEALTH - RISK ASSESSMENT
low risk  Acute : Less preoccupied with any negative thoughts about herself. ,  decreased anxiety, still suspicious, can be redirected for short periods of time, poor sleep   mitigating: willing to take medication in the hospital , pt with better behavior with redirection, verbalized willing to return home  protective: family support   chronic: misuse of medications, possible over use of sedative hypnotics

## 2020-08-18 NOTE — PROGRESS NOTE BEHAVIORAL HEALTH - SUMMARY
Patient is an 78yo  female, , domiciled at [facility], with past psych hx of depression and anxiety, no prior psychiatric hospitalizations, with history of suicidal statement but no prior self-injurious behaviors or suicide attempt, was referred by [place] and brought in by EMS for paranoia with agitation.   pt presents with increasing paranoid ideas and agitation at her facility. she is confused on interview. no evidence of suicidality. collateral from facility at this point in time is limited, pt should be held pending more detailed collateral from facility and family in order to formulate a plan for treatment of what appears to be worsening dementia with paranoia/behavioral disturbance.    RE-EVAL: 8.7.2020 - Patient symptoms presenting as an acute risk for harm to self / others secondary to paranoia, poor insight and judgment, ?in the setting of ?dementia. As per Dr. Moon, and daughter, patient does not have a diagnosis of Dementia. Hence. Patient presenting indicating a psychosis (paranoia). Patient's paranoia also exacerbating her Anxiety. Patient has recent active depressive symptoms secondary to being in [place]. Patient has behavioral disturbance due to paranoia, of which would benefit from and requiring in-patient hospitalization for safety and stabilization.    Hospitalization course:  8/18/2020 pt did not sleep at night, Risperdal and xanax was able to give sleep but needing to make medication adjustment of the time of the medications   8/17/2020 Pt is perseverating and forgetful, accusatory   8/14/2020 Pt remains with suspicious guardedness.   8/13/2020 pt with thoughts of going home if only the aides available. Pt with not wanting to see neurology  8/12/2020 Pt is forgetful often forgeting conversations and then becomes paranoid and accusatory   8/11/2020 Pt with decreasing anxiety , still able to sleep , and appetite is good  d/c the remeron and then the Risperdal is needed for suspicious thoughts. continue with the zoloft

## 2020-08-19 PROCEDURE — 99232 SBSQ HOSP IP/OBS MODERATE 35: CPT

## 2020-08-19 RX ADMIN — Medication 5 MILLIGRAM(S): at 20:56

## 2020-08-19 RX ADMIN — AMLODIPINE BESYLATE 2.5 MILLIGRAM(S): 2.5 TABLET ORAL at 09:28

## 2020-08-19 RX ADMIN — Medication 25 MILLIGRAM(S): at 09:29

## 2020-08-19 RX ADMIN — RISPERIDONE 0.25 MILLIGRAM(S): 4 TABLET ORAL at 09:29

## 2020-08-19 RX ADMIN — Medication 650 MILLIGRAM(S): at 13:05

## 2020-08-19 RX ADMIN — Medication 0.5 MILLIGRAM(S): at 20:56

## 2020-08-19 RX ADMIN — MIRTAZAPINE 45 MILLIGRAM(S): 45 TABLET, ORALLY DISINTEGRATING ORAL at 20:55

## 2020-08-19 RX ADMIN — FAMOTIDINE 20 MILLIGRAM(S): 10 INJECTION INTRAVENOUS at 09:29

## 2020-08-19 RX ADMIN — RISPERIDONE 0.5 MILLIGRAM(S): 4 TABLET ORAL at 20:57

## 2020-08-19 RX ADMIN — ATORVASTATIN CALCIUM 40 MILLIGRAM(S): 80 TABLET, FILM COATED ORAL at 20:57

## 2020-08-19 RX ADMIN — Medication 25 MILLIGRAM(S): at 20:55

## 2020-08-19 RX ADMIN — Medication 650 MILLIGRAM(S): at 14:14

## 2020-08-19 RX ADMIN — Medication 2 SPRAY(S): at 20:55

## 2020-08-19 NOTE — PROGRESS NOTE BEHAVIORAL HEALTH - NSBHFUPINTERVALHXFT_PSY_A_CORE
Pt slept yesterday , will continue with the current medication schedule with the xanax at bedtime and less during the day.  Pt more alert today . Pt will be needing to return to the [facility] for rehab in the interim until the completion of the renovations to the house .  She is aware of the plans and is willing to have the covid test repeated as that is requested by the [facility] .

## 2020-08-19 NOTE — PROGRESS NOTE BEHAVIORAL HEALTH - SUMMARY
Patient is an 80yo  female, , domiciled at [facility], with past psych hx of depression and anxiety, no prior psychiatric hospitalizations, with history of suicidal statement but no prior self-injurious behaviors or suicide attempt, was referred by [facility] and brought in by EMS for paranoia with agitation.   pt presents with increasing paranoid ideas and agitation at her facility. she is confused on interview. no evidence of suicidality. collateral from facility at this point in time is limited, pt should be held pending more detailed collateral from facility and family in order to formulate a plan for treatment of what appears to be worsening dementia with paranoia/behavioral disturbance.    RE-EVAL: 8.7.2020 - Patient symptoms presenting as an acute risk for harm to self / others secondary to paranoia, poor insight and judgment, ?in the setting of ?dementia. As per Dr. Moon, and daughter, patient does not have a diagnosis of Dementia. Hence. Patient presenting indicating a psychosis (paranoia). Patient's paranoia also exacerbating her Anxiety. Patient has recent active depressive symptoms secondary to being in [place]. Patient has behavioral disturbance due to paranoia, of which would benefit from and requiring in-patient hospitalization for safety and stabilization.    Hospitalization course:  8/19/2020 pt with increased alertness as she was able to have better sleep yesrterday.  Pt with awareness of the plan for her to return to the [facility].  Pt needing to  have the covid test repeated tomorrow but the pt claims she had the test  today.   Nursing verified that  pt did not get the covid test today, will order for tomorrow.    8/18/2020 pt did not sleep at night, Risperdal and xanax was able to give sleep but needing to make medication adjustment of the time of the medications   8/17/2020 Pt is perseverating and forgetful, accusatory   8/14/2020 Pt remains with suspicious guardedness.   8/13/2020 pt with thoughts of going home if only the aides available. Pt with not wanting to see neurology  8/12/2020 Pt is forgetful often forgeting conversations and then becomes paranoid and accusatory   8/11/2020 Pt with decreasing anxiety , still able to sleep , and appetite is good  d/c the remeron and then the Risperdal is needed for suspicious thoughts. continue with the zoloft

## 2020-08-19 NOTE — PROGRESS NOTE BEHAVIORAL HEALTH - RISK ASSESSMENT
low risk  Acute : Less preoccupied with any negative thoughts about herself. ,  decreased anxiety, still suspicious, can be redirected for short periods of time, slept well    mitigating: willing to take medication in the hospital , pt with better behavior with redirection, verbalized willing to return home  protective: family support   chronic: misuse of medications, possible over use of sedative hypnotics

## 2020-08-20 LAB — SARS-COV-2 RNA SPEC QL NAA+PROBE: SIGNIFICANT CHANGE UP

## 2020-08-20 PROCEDURE — 99232 SBSQ HOSP IP/OBS MODERATE 35: CPT

## 2020-08-20 RX ORDER — MULTIVIT-MIN/FERROUS GLUCONATE 9 MG/15 ML
1 LIQUID (ML) ORAL
Qty: 0 | Refills: 0 | DISCHARGE

## 2020-08-20 RX ORDER — METOPROLOL TARTRATE 50 MG
0.5 TABLET ORAL
Qty: 30 | Refills: 0
Start: 2020-08-20 | End: 2020-09-18

## 2020-08-20 RX ORDER — RISPERIDONE 4 MG/1
1 TABLET ORAL
Qty: 30 | Refills: 0
Start: 2020-08-20 | End: 2020-09-18

## 2020-08-20 RX ORDER — AMLODIPINE BESYLATE 2.5 MG/1
1 TABLET ORAL
Qty: 0 | Refills: 0 | DISCHARGE
Start: 2020-08-20

## 2020-08-20 RX ORDER — FESOTERODINE FUMARATE 8 MG/1
1 TABLET, FILM COATED, EXTENDED RELEASE ORAL
Qty: 0 | Refills: 0 | DISCHARGE

## 2020-08-20 RX ORDER — LANOLIN ALCOHOL/MO/W.PET/CERES
1 CREAM (GRAM) TOPICAL
Qty: 30 | Refills: 0
Start: 2020-08-20 | End: 2020-09-18

## 2020-08-20 RX ORDER — LACTULOSE 10 G/15ML
30 SOLUTION ORAL
Qty: 0 | Refills: 0 | DISCHARGE

## 2020-08-20 RX ORDER — FLUTICASONE PROPIONATE 50 MCG
2 SPRAY, SUSPENSION NASAL
Qty: 0 | Refills: 0 | DISCHARGE

## 2020-08-20 RX ORDER — SENNA PLUS 8.6 MG/1
2 TABLET ORAL
Qty: 0 | Refills: 0 | DISCHARGE
Start: 2020-08-20

## 2020-08-20 RX ORDER — RISPERIDONE 4 MG/1
1 TABLET ORAL
Qty: 0 | Refills: 0 | DISCHARGE
Start: 2020-08-20

## 2020-08-20 RX ORDER — MIRTAZAPINE 45 MG/1
1 TABLET, ORALLY DISINTEGRATING ORAL
Qty: 30 | Refills: 0
Start: 2020-08-20 | End: 2020-09-18

## 2020-08-20 RX ORDER — FLUTICASONE PROPIONATE 50 MCG
2 SPRAY, SUSPENSION NASAL
Qty: 0 | Refills: 0 | DISCHARGE
Start: 2020-08-20

## 2020-08-20 RX ORDER — ATORVASTATIN CALCIUM 80 MG/1
1 TABLET, FILM COATED ORAL
Qty: 0 | Refills: 0 | DISCHARGE
Start: 2020-08-20

## 2020-08-20 RX ORDER — ALPRAZOLAM 0.25 MG
1 TABLET ORAL
Qty: 30 | Refills: 0
Start: 2020-08-20 | End: 2020-09-18

## 2020-08-20 RX ORDER — METOPROLOL TARTRATE 50 MG
1 TABLET ORAL
Qty: 0 | Refills: 0 | DISCHARGE
Start: 2020-08-20

## 2020-08-20 RX ORDER — LANOLIN ALCOHOL/MO/W.PET/CERES
1 CREAM (GRAM) TOPICAL
Qty: 0 | Refills: 0 | DISCHARGE
Start: 2020-08-20

## 2020-08-20 RX ORDER — DIAZEPAM 5 MG
0 TABLET ORAL
Qty: 0 | Refills: 0 | DISCHARGE

## 2020-08-20 RX ORDER — QUETIAPINE FUMARATE 200 MG/1
1 TABLET, FILM COATED ORAL
Qty: 0 | Refills: 0 | DISCHARGE

## 2020-08-20 RX ORDER — SENNA PLUS 8.6 MG/1
2 TABLET ORAL
Qty: 30 | Refills: 0
Start: 2020-08-20 | End: 2020-09-03

## 2020-08-20 RX ORDER — FAMOTIDINE 10 MG/ML
1 INJECTION INTRAVENOUS
Qty: 0 | Refills: 0 | DISCHARGE
Start: 2020-08-20

## 2020-08-20 RX ORDER — SENNA PLUS 8.6 MG/1
2 TABLET ORAL
Qty: 0 | Refills: 0 | DISCHARGE

## 2020-08-20 RX ORDER — MIRTAZAPINE 45 MG/1
1 TABLET, ORALLY DISINTEGRATING ORAL
Qty: 0 | Refills: 0 | DISCHARGE
Start: 2020-08-20

## 2020-08-20 RX ORDER — ALPRAZOLAM 0.25 MG
1 TABLET ORAL
Qty: 0 | Refills: 0 | DISCHARGE
Start: 2020-08-20

## 2020-08-20 RX ORDER — FAMOTIDINE 10 MG/ML
1 INJECTION INTRAVENOUS
Qty: 0 | Refills: 0 | DISCHARGE

## 2020-08-20 RX ORDER — ACETAMINOPHEN 500 MG
2 TABLET ORAL
Qty: 120 | Refills: 0
Start: 2020-08-20 | End: 2020-09-03

## 2020-08-20 RX ORDER — FLUTICASONE PROPIONATE 50 MCG
1 SPRAY, SUSPENSION NASAL
Qty: 30 | Refills: 0
Start: 2020-08-20 | End: 2020-09-18

## 2020-08-20 RX ORDER — ACETAMINOPHEN 500 MG
2 TABLET ORAL
Qty: 0 | Refills: 0 | DISCHARGE
Start: 2020-08-20

## 2020-08-20 RX ADMIN — Medication 5 MILLIGRAM(S): at 21:12

## 2020-08-20 RX ADMIN — Medication 2 SPRAY(S): at 21:12

## 2020-08-20 RX ADMIN — AMLODIPINE BESYLATE 2.5 MILLIGRAM(S): 2.5 TABLET ORAL at 09:47

## 2020-08-20 RX ADMIN — Medication 25 MILLIGRAM(S): at 21:12

## 2020-08-20 RX ADMIN — Medication 25 MILLIGRAM(S): at 09:47

## 2020-08-20 RX ADMIN — Medication 650 MILLIGRAM(S): at 05:27

## 2020-08-20 RX ADMIN — Medication 0.5 MILLIGRAM(S): at 21:11

## 2020-08-20 RX ADMIN — MIRTAZAPINE 45 MILLIGRAM(S): 45 TABLET, ORALLY DISINTEGRATING ORAL at 21:12

## 2020-08-20 RX ADMIN — Medication 650 MILLIGRAM(S): at 21:12

## 2020-08-20 RX ADMIN — ATORVASTATIN CALCIUM 40 MILLIGRAM(S): 80 TABLET, FILM COATED ORAL at 21:13

## 2020-08-20 RX ADMIN — Medication 650 MILLIGRAM(S): at 04:57

## 2020-08-20 RX ADMIN — RISPERIDONE 0.25 MILLIGRAM(S): 4 TABLET ORAL at 09:47

## 2020-08-20 RX ADMIN — RISPERIDONE 0.5 MILLIGRAM(S): 4 TABLET ORAL at 21:12

## 2020-08-20 RX ADMIN — FAMOTIDINE 20 MILLIGRAM(S): 10 INJECTION INTRAVENOUS at 09:47

## 2020-08-20 NOTE — DISCHARGE NOTE BEHAVIORAL HEALTH - CONDITIONS AT DISCHARGE
Patient was seen and evaluated by treatment team and is discharged.  She is alert, ambulatory with wheelchair; no distress noted nor voiced by patient; she denies current suicidal/homicidal ideation, and denies auditory/visual hallucination.  Discharge and follow-up instructions explained and given to family member and they verbalized understanding.  All belongings returned to patient and she is dressed appropriately for d/c.  Patient left the unit, accompanied by Ambulance staff.

## 2020-08-20 NOTE — DISCHARGE NOTE BEHAVIORAL HEALTH - FAMILY HISTORY OF PSYCHIATRIC ILLNESS
Family hx of mental illness/ substance abuse denied.    Pt is  x 18 years.  She has 2 adult daughters, Marielle who lives in [place] and Xenia who lives in [facility].   Pt is a HS graduate.  She is retired.    Pt owns her own home in [place] [facility].  She was living there until she was hospitalized at  12/19.  from  she was paced at [facility] 12/19-1/20.  From rehab pt was placed at [facility] where she has been since 1/20.  The transition has been difficult as pt's family has been unable to visit due to Covid 19.  Pt now states she wants to return home again and her daughter is working with a contractor to make the home handicapped accessible and is hiring aides to stay with her.  Pt is returning to [facility] until her home is renovated.

## 2020-08-20 NOTE — DISCHARGE NOTE BEHAVIORAL HEALTH - MEDICATION SUMMARY - MEDICATIONS TO TAKE
I will START or STAY ON the medications listed below when I get home from the hospital:    norvasc 2.5mg  -- 1 tab(s) by mouth once a day   -- Indication: For htn    pepcid 20mg  -- 1 tab(s) by mouth once a day   -- Indication: For Gerd    acetaminophen 325 mg oral tablet  -- 2 tab(s) by mouth every 6 hours, As needed, Moderate Pain (4 - 6)  -- Indication: For Pain    mirtazapine 45 mg oral tablet  -- 1 tab(s) by mouth once a day (at bedtime)  -- Indication: For Depression    atorvastatin 40 mg oral tablet  -- 1 tab(s) by mouth once a day (at bedtime)  -- Indication: For hyperlipidemia    risperiDONE 0.5 mg oral tablet  -- 1 tab(s) by mouth once a day (at bedtime)  -- Indication: For Psychosis, paranoid    risperiDONE 0.25 mg oral tablet  -- 1 tab(s) by mouth once a day  -- Indication: For Psychosis, paranoid    ALPRAZolam 0.5 mg oral tablet  -- 1 tab(s) by mouth once a day (at bedtime)  -- Indication: For anxiety    metoprolol tartrate 25 mg oral tablet  -- 1 tab(s) by mouth 2 times a day  -- Indication: For htn    amLODIPine 2.5 mg oral tablet  -- 1 tab(s) by mouth once a day  -- Indication: For cardiovascular    famotidine 20 mg oral tablet  -- 1 tab(s) by mouth once a day  -- Indication: For Gerd    senna oral tablet  -- 2 tab(s) by mouth once a day (at bedtime), As needed, Constipation  -- Indication: For constipation    fluticasone 50 mcg/inh nasal spray  -- 2 spray(s) into nose   -- Indication: For allergies    melatonin 5 mg oral tablet  -- 1 tab(s) by mouth once a day (at bedtime)  -- Indication: For insomnia

## 2020-08-20 NOTE — PROGRESS NOTE BEHAVIORAL HEALTH - NSBHFUPINTERVALHXFT_PSY_A_CORE
Pt is aware of the plan for her to return to the [facility] as her daughters have indicated that her home is still being renovated  Pt claiming that she is comfortable with the plan as she anticipates that she will return home after the rehab treatments.

## 2020-08-20 NOTE — DISCHARGE NOTE BEHAVIORAL HEALTH - NSBHDCREFEROTHERFT_PSY_A_CORE
Daughter has arranged for Ms. Callaway to have 24 hour aides with hwer upon discharge.  Referral was made for you to get physical therapy at the [facility] through [facility].

## 2020-08-20 NOTE — DISCHARGE NOTE BEHAVIORAL HEALTH - NSBHDCVIOLSAFETYFT_PSY_A_CORE
1.Advised to return to hospital or go to nearest ED or call 911 or (834) [facility] PAC or [phone] 157 TALK hotlines for any severe, worsening or persistent symptoms including suicidal/homicidal ideations, intent or plans. Patient verbalized understanding of instructions.

## 2020-08-20 NOTE — DISCHARGE NOTE BEHAVIORAL HEALTH - NSBHDCTHERAPYFT_PSY_A_CORE
Individual and group therapy, medication management, psychoeducation, safety planning, discharge planning, family involvement.

## 2020-08-20 NOTE — DISCHARGE NOTE BEHAVIORAL HEALTH - SECONDARY DIAGNOSIS.
Dementia with behavioral disturbance Hypertension, unspecified type Hyperlipidemia, unspecified hyperlipidemia type Stenosis of lumbosacral spine

## 2020-08-20 NOTE — DISCHARGE NOTE BEHAVIORAL HEALTH - NSBHDCRESOURCESOTHERFT_PSY_A_CORE
Daughter:  Marielle Moeller  ([phone])    Daughter has been working with a contractor to renovate a downstairs bathroom and put in a ramp to enter the house and when this is completed you can return home.  Your daughter has hired an aid to be with you at [facility] 24 hours/day to provide assistance to you until you return home.  She will then continue the aides at home.

## 2020-08-20 NOTE — DISCHARGE NOTE BEHAVIORAL HEALTH - NSBHDCADDR1FT_A_CORE
[facility]  [address] [place] [address] [address] sees residents at [facility] on Thursdays.  You will be seen at the facility on Thursday 8/27.

## 2020-08-20 NOTE — DISCHARGE NOTE BEHAVIORAL HEALTH - NSBHDCSUBSTHXFT_PSY_A_CORE
Pt reports remote hx of ETOH abuse, is reportedly sober for 12-13 years has been an active member of AA in the past.

## 2020-08-20 NOTE — DISCHARGE NOTE BEHAVIORAL HEALTH - NSBHDCSWCOMMENTSFT_PSY_A_CORE
Ms. Callaway and her daughter were educated about the importance of remaining in outpatient mental health treatment, taking her medication as prescribed, continuing with physical therapy and about her discharge plan.  She was advised not to stop taking any medication unless told to do so by a physician.  She was also educated about safety precautions for Covid 19 including wearing a mask, social distancing and hand hygiene.

## 2020-08-20 NOTE — DISCHARGE NOTE BEHAVIORAL HEALTH - NSBHDCADDFT_PSY_A_CORE
08-08 Chol 154 LDL 93 HDL 44<L> Trig 83  HgA1C  5.4  Ventricular Rate 88 BPM    Atrial Rate 86 BPM    QRS Duration 76 ms    Q-T Interval 372 ms    QTC Calculation(Bezet) 450 ms    R Axis 69 degrees    T Axis 20 degrees    Diagnosis Line Sinus rhythm with frequent Premature atrial complexes  Abnormal ECG  Confirmed by ISA REID MD ([id number]) on 7/26/2020 8:29:50 PM

## 2020-08-20 NOTE — DISCHARGE NOTE BEHAVIORAL HEALTH - NSBHDCADDR2FT_A_CORE
[facility]  [address] [place] [address]    Ms. Mcfadden will see you for therapy twice/week.

## 2020-08-20 NOTE — DISCHARGE NOTE BEHAVIORAL HEALTH - NSBHDCMEDSFT_PSY_A_CORE
MEDICATIONS  (STANDING):  ALPRAZolam 0.5 milliGRAM(s) Oral at bedtime  amLODIPine   Tablet 2.5 milliGRAM(s) Oral daily  atorvastatin 40 milliGRAM(s) Oral at bedtime  famotidine    Tablet 20 milliGRAM(s) Oral daily  fluticasone propionate 50 MICROgram(s)/spray Nasal Spray 2 Spray(s) Both Nostrils <User Schedule>  melatonin 5 milliGRAM(s) Oral at bedtime  metoprolol tartrate 25 milliGRAM(s) Oral two times a day  mirtazapine 45 milliGRAM(s) Oral at bedtime  risperiDONE   Tablet 0.5 milliGRAM(s) Oral at bedtime  risperiDONE   Tablet 0.25 milliGRAM(s) Oral daily  Pt is directed to continue with all medications until directed by her MD to change or discontinued with medications.  Pt with euthymic mood and affect is full and mood congruent. Pt denies any suicidal ideation intent or plan .  Pt with

## 2020-08-20 NOTE — DISCHARGE NOTE BEHAVIORAL HEALTH - HPI (INCLUDE ILLNESS QUALITY, SEVERITY, DURATION, TIMING, CONTEXT, MODIFYING FACTORS, ASSOCIATED SIGNS AND SYMPTOMS)
· Per ED Evaluation:	Patient is an 80yo  female, , domiciled at [facility], with past psych hx of depression and anxiety, no prior psychiatric hospitalizations, with history of suicidal statement but no prior self-injurious behaviors or suicide attempt, was referred by [facility] and brought in by EMS for paranoia with agitation.   Patient recently was admittted to medicine for UTI and followed by [facility] psychiatry, discharged on 7/30, for uti, pt has been more paranoid as of late, believing people at [facility] are against her, and that her daughter is conspiring against her as well by selling her home.   Patient was treated on medical floor with:   1. Zoloft 150 mg daily  2. Remeron 15 mg at bedtime   3. Xanax 0.5 mg once daily for Anxiety   4. Seroquel 25 mg twice daily for paranoia.   On interview today, pt says she "had some confusion" but unable to explicate ("so many things going on"), states she had a sense of vertigo as well . she then goes on to say the staff at the facility are not nice to her and she doesn't "feel safe," she says they are all out to get her money, it's a "big scam," she bemoans "the world the way it is now," and that the pandemic occurred shortly after she moved in there. she says because she wears a pendant around her neck, the staff there "are bothered."   she complains that her daughter is selling her house, and that I am the fifth psychiatrist she has spoken to in the past week, saying "it's an invasion of privacy," and asking at one point if she should have her  present in order to continue the interview.  denies psychiatric sx's ("I am a happy person,") denies si/hi/ah/vh.  is oriented to self and situation, believes she is in [facility], and that the date is august 20.  see  note for collateral from facility.    RE-EVAL: 8.7.2020 - Patient presenting poorly related today; irritable, labile, agitated, anxious in the setting of paranoia, statin "they are doing this to me; the [facility] kidnapped me, put me in a van." Reports "they are trying to get to my money; this is all a scam; I do not trust anyone." Patient reports [facility] is conspiring against her to exhaust her finances; and daughter is working with them. Patient has impaired reasoning. Collateral from daughter: patient has been decompensating in the last 6-8 weeks, with 3-4 ED visit and one medical admission for UTI, however stating episodes of paranoia started several weeks prior UTI. Reports patient has been irritable, agitated, labile at [facility] in the setting of paranoia about them being "after" her and her money; and they have been unable to manage her. Reports medication non-compliance. Reports recent history of patient thinking that the  staff are trying to kill her. Reports patient attempted to leave [facility] via wheelchair stating "she was escaping." Reports increased impulsivity, poor insight and judgment. Reports patient psychiatric state has decompensated requesting psychiatric admission for safety and stabilization. Discussed with Dr. Moon, who states patient has been decompensating with paranoia influencing impulsivity, irritability, agitation that cannot be handled at [facility]. Denies patient ever being diagnosed with Dementia, however has a known chronic history of being forgetfulness. Patient however, at baseline, is linear, organized, appropriate, in tact long term memory, has not seen indication of Dementia. Reports patient can return to [facility] once patient is stabilized.

## 2020-08-20 NOTE — DISCHARGE NOTE BEHAVIORAL HEALTH - NSBHDCHOUSING_PSY_A_CORE
[facility] [address]   ([phone])  Wellness Office:  [phone]) Ashley/other facility...

## 2020-08-20 NOTE — DISCHARGE NOTE BEHAVIORAL HEALTH - NSBHDCVIOLFCTRSFT_PSY_A_CORE
1. psychosis- on risperdal -Pt is directed to continue with all medications until directed by his MD to change or discontinued with medications  2. anxiety -on xanax-Pt is directed to continue with all medications until directed by his MD to change or discontinued with medications

## 2020-08-20 NOTE — DISCHARGE NOTE BEHAVIORAL HEALTH - NSBHDCCRISISPLAN1FT_PSY_A_CORE
Tell staff at the [facility], tell your daughter, tell Dr. Mireles or your therapist, go to the nearest emergency room.

## 2020-08-20 NOTE — PROGRESS NOTE BEHAVIORAL HEALTH - RISK ASSESSMENT
low risk  Acute : Less preoccupied with any negative thoughts about herself. ,  decreased anxiety, still suspicious, can be redirected for short periods of time, slept well    mitigating: willing to take medication in the hospital , pt with better behavior with redirection, verbalized willing to return home  protective: family support   chronic: misuse of medications, possible over use of sedative hypnotics low risk  Acute : Less preoccupied with any negative thoughts about herself. ,  decreased anxiety, still suspicious, can be redirected for short periods of time, slept well    mitigating: willing to take medication in the hospital , pt with better behavior with redirection, verbalized willing to go to the [facility] for rehab treatment   protective: family support   chronic: misuse of medications, possible over use of sedative hypnotics

## 2020-08-20 NOTE — DISCHARGE NOTE BEHAVIORAL HEALTH - MEDICATION SUMMARY - MEDICATIONS TO CHANGE
I will SWITCH the dose or number of times a day I take the medications listed below when I get home from the hospital:    mirtazapine 15 mg oral tablet  -- 1 tab(s) by mouth once a day (at bedtime)

## 2020-08-20 NOTE — DISCHARGE NOTE BEHAVIORAL HEALTH - MEDICATION SUMMARY - MEDICATIONS TO STOP TAKING
I will STOP taking the medications listed below when I get home from the hospital:    Certa-Mitchel oral tablet  -- 1 tab(s) by mouth once a day    Toviaz 8 mg oral tablet, extended release  -- 1 tab(s) by mouth once a day    Kenalog Dental Paste  -- Apply to oral ulcers twice daily    sertraline 50 mg oral tablet  -- 3 tab(s) by mouth once a day    cefuroxime 250 mg oral tablet  -- 1 tab(s) by mouth every 12 hours    QUEtiapine 50 mg oral tablet  -- 1 tab(s) by mouth 2 times a day    valACYclovir 500 mg oral tablet  -- 1 tab(s) by mouth 3 times a day    lactulose 10 g/15 mL oral solution  -- 30 milliliter(s) by mouth once a day, As Needed - for constipation    diazePAM 2 mg oral tablet    nitrofurantoin macrocrystals 100 mg oral capsule  -- 1 cap(s) by mouth 2 times a day    Debrox 6.5% otic solution  -- 5 drop(s) to each affected ear 2 times a day    PreserVision AREDS 2 oral capsule  -- 1 cap(s) by mouth 2 times a day

## 2020-08-21 VITALS
OXYGEN SATURATION: 98 % | RESPIRATION RATE: 18 BRPM | SYSTOLIC BLOOD PRESSURE: 122 MMHG | DIASTOLIC BLOOD PRESSURE: 84 MMHG | TEMPERATURE: 98 F | HEART RATE: 104 BPM

## 2020-08-21 PROCEDURE — 99238 HOSP IP/OBS DSCHRG MGMT 30/<: CPT

## 2020-08-21 RX ADMIN — RISPERIDONE 0.25 MILLIGRAM(S): 4 TABLET ORAL at 09:28

## 2020-08-21 RX ADMIN — Medication 650 MILLIGRAM(S): at 05:42

## 2020-08-21 RX ADMIN — Medication 25 MILLIGRAM(S): at 09:28

## 2020-08-21 RX ADMIN — FAMOTIDINE 20 MILLIGRAM(S): 10 INJECTION INTRAVENOUS at 09:28

## 2020-08-21 RX ADMIN — AMLODIPINE BESYLATE 2.5 MILLIGRAM(S): 2.5 TABLET ORAL at 09:28

## 2020-08-21 NOTE — PROGRESS NOTE BEHAVIORAL HEALTH - EYE CONTACT
MC:  Seen last week in office for persistent cough, still coughing now, and mom is requesting Rx for abx. Zithromax x 5 days ordered.
Good

## 2020-08-21 NOTE — PROGRESS NOTE BEHAVIORAL HEALTH - THOUGHT PROCESS
Impaired reasoning/Illogical
Linear/Normal reasoning
Normal reasoning/Linear
Linear
Impaired reasoning/Illogical
Linear/Normal reasoning
Illogical/Impaired reasoning
Linear/Normal reasoning
Linear/Normal reasoning
Normal reasoning/Linear
Linear
Normal reasoning/Linear
Linear/Normal reasoning
Linear

## 2020-08-21 NOTE — PROGRESS NOTE BEHAVIORAL HEALTH - OTHER
did not ambulate - in bed

## 2020-08-21 NOTE — PROGRESS NOTE BEHAVIORAL HEALTH - NSBHPTASSESSDT_PSY_A_CORE
08-Aug-2020 15:03
09-Aug-2020 12:53
10-Aug-2020 13:39
11-Aug-2020 09:20
12-Aug-2020 09:43
14-Aug-2020 19:15
17-Aug-2020 09:42
07-Aug-2020 14:26
18-Aug-2020 13:06
13-Aug-2020 14:56
12-Aug-2020 09:41
21-Aug-2020 11:28
19-Aug-2020 16:26
20-Aug-2020 11:44

## 2020-08-21 NOTE — PROGRESS NOTE BEHAVIORAL HEALTH - PRIMARY DX
Psychosis, paranoid

## 2020-08-21 NOTE — PROGRESS NOTE BEHAVIORAL HEALTH - MOOD
Normal
Depressed
Depressed
Normal
Depressed
Normal
Depressed
Depressed
Normal
Depressed
Depressed
Normal
Depressed
Normal

## 2020-08-21 NOTE — PROGRESS NOTE BEHAVIORAL HEALTH - NSBHLEGALSTATUS_PSY_A_CORE
9.27 (2PC)

## 2020-08-21 NOTE — PROGRESS NOTE BEHAVIORAL HEALTH - NSBHFUPTYPE_PSY_A_CORE
Inpatient
Inpatient-On Service Note
Inpatient

## 2020-08-21 NOTE — PROGRESS NOTE BEHAVIORAL HEALTH - SUMMARY
Patient is an 80yo  female, , domiciled at [facility], with past psych hx of depression and anxiety, no prior psychiatric hospitalizations, with history of suicidal statement but no prior self-injurious behaviors or suicide attempt, was referred by [facility] and brought in by EMS for paranoia with agitation.   pt presents with increasing paranoid ideas and agitation at her facility. she is confused on interview. no evidence of suicidality. collateral from facility at this point in time is limited, pt should be held pending more detailed collateral from facility and family in order to formulate a plan for treatment of what appears to be worsening dementia with paranoia/behavioral disturbance.    RE-EVAL: 8.7.2020 - Patient symptoms presenting as an acute risk for harm to self / others secondary to paranoia, poor insight and judgment, ?in the setting of ?dementia. As per Dr. Moon, and daughter, patient does not have a diagnosis of Dementia. Hence. Patient presenting indicating a psychosis (paranoia). Patient's paranoia also exacerbating her Anxiety. Patient has recent active depressive symptoms secondary to being in [place]. Patient has behavioral disturbance due to paranoia, of which would benefit from and requiring in-patient hospitalization for safety and stabilization.    Hospitalization course:  8/19/2020 pt with increased alertness as she was able to have better sleep yesrterday.  Pt with awareness of the plan for her to return to the [facility].  Pt needing to  have the covid test repeated tomorrow but the pt claims she had the test  today.   Nursing verified that  pt did not get the covid test today, will order for tomorrow.    8/18/2020 pt did not sleep at night, Risperdal and xanax was able to give sleep but needing to make medication adjustment of the time of the medications   8/17/2020 Pt is perseverating and forgetful, accusatory   8/14/2020 Pt remains with suspicious guardedness.   8/13/2020 pt with thoughts of going home if only the aides available. Pt with not wanting to see neurology  8/12/2020 Pt is forgetful often forgeting conversations and then becomes paranoid and accusatory   8/11/2020 Pt with decreasing anxiety , still able to sleep , and appetite is good  d/c the remeron and then the Risperdal is needed for suspicious thoughts. continue with the zoloft Patient is an 80yo  female, , domiciled at [facility], with past psych hx of depression and anxiety, no prior psychiatric hospitalizations, with history of suicidal statement but no prior self-injurious behaviors or suicide attempt, was referred by [facility] and brought in by EMS for paranoia with agitation.   pt presents with increasing paranoid ideas and agitation at her facility. she is confused on interview. no evidence of suicidality. collateral from facility at this point in time is limited, pt should be held pending more detailed collateral from facility and family in order to formulate a plan for treatment of what appears to be worsening dementia with paranoia/behavioral disturbance.    RE-EVAL: 8.7.2020 - Patient symptoms presenting as an acute risk for harm to self / others secondary to paranoia, poor insight and judgment, ?in the setting of ?dementia. As per Dr. Moon, and daughter, patient does not have a diagnosis of Dementia. Hence. Patient presenting indicating a psychosis (paranoia). Patient's paranoia also exacerbating her Anxiety. Patient has recent active depressive symptoms secondary to being in [facility]. Patient has behavioral disturbance due to paranoia, of which would benefit from and requiring in-patient hospitalization for safety and stabilization.    Hospitalization course:  8/21/2020 Pt willing to attend aftercare rehab and is hopeful about then returning to home   8/20/2020 Pt able to sleep and is aware of the plan for discharge to the [facility]   8/19/2020 pt with increased alertness as she was able to have better sleep yesterday.  Pt with awareness of the plan for her to return to the [facility].  Pt needing to  have the covid test repeated tomorrow but the pt claims she had the test  today.   Nursing verified that  pt did not get the covid test today, will order for tomorrow.    8/18/2020 pt did not sleep at night, Risperdal and xanax was able to give sleep but needing to make medication adjustment of the time of the medications   8/17/2020 Pt is perseverating and forgetful, accusatory   8/14/2020 Pt remains with suspicious guardedness.   8/13/2020 pt with thoughts of going home if only the aides available. Pt with not wanting to see neurology  8/12/2020 Pt is forgetful often forgeting conversations and then becomes paranoid and accusatory   8/11/2020 Pt with decreasing anxiety , still able to sleep , and appetite is good  d/c the remeron and then the Risperdal is needed for suspicious thoughts. continue with the zoloft

## 2020-08-21 NOTE — PROGRESS NOTE BEHAVIORAL HEALTH - NSBHADMITIPREASONDETAILS_PSY_A_CORE FT
Unable to care for herself

## 2020-08-21 NOTE — PROGRESS NOTE BEHAVIORAL HEALTH - PROBLEM SELECTOR PLAN 2
continue with 1:1 as the pt is a fall risk   PT to evaluate about balance and ability to use walker

## 2020-08-21 NOTE — PROGRESS NOTE BEHAVIORAL HEALTH - PROBLEM SELECTOR PLAN 1
d/c seroquel   risperdal 0.25mg po am and risperdal 0.5mg po hs

## 2020-08-21 NOTE — PROGRESS NOTE BEHAVIORAL HEALTH - NSBHCHARTREVIEWINVESTIGATE_PSY_A_CORE FT
ventricular Rate 88 BPM    Atrial Rate 86 BPM    QRS Duration 76 ms    Q-T Interval 372 ms    QTC Calculation(Bezet) 450 ms    R Axis 69 degrees    T Axis 20 degrees    Diagnosis Line Sinus rhythm with frequent Premature atrial complexes  Abnormal ECG  Confirmed by ISA REID MD ([id number]) on 7/26/2020 8:29:50 PM
< from: 12 Lead ECG (07.26.20 @ 11:16) >    Ventricular Rate 88 BPM    Atrial Rate 86 BPM    QRS Duration 76 ms    Q-T Interval 372 ms    QTC Calculation(Bezet) 450 ms    R Axis 69 degrees    T Axis 20 degrees    Diagnosis Line Sinus rhythm with frequent Premature atrial complexes  Abnormal ECG  Confirmed by ISA REID MD ([id number]) on 7/26/2020 8:29:50 PM
< from: 12 Lead ECG (07.26.20 @ 11:16) >    Ventricular Rate 88 BPM    Atrial Rate 86 BPM    QRS Duration 76 ms    Q-T Interval 372 ms    QTC Calculation(Bezet) 450 ms    R Axis 69 degrees    T Axis 20 degrees    Diagnosis Line Sinus rhythm with frequent Premature atrial complexes  Abnormal ECG  Confirmed by ISA REID MD ([id number]) on 7/26/2020 8:29:50 PM
Ventricular Rate 88 BPM    Atrial Rate 86 BPM    QRS Duration 76 ms    Q-T Interval 372 ms    QTC Calculation(Bezet) 450 ms    R Axis 69 degrees    T Axis 20 degrees    Diagnosis Line Sinus rhythm with frequent Premature atrial complexes  Abnormal ECG  Confirmed by ISA REID MD ([id number]) on 7/26/2020 8:29:50 PM
ventricular Rate 88 BPM    Atrial Rate 86 BPM    QRS Duration 76 ms    Q-T Interval 372 ms    QTC Calculation(Bezet) 450 ms    R Axis 69 degrees    T Axis 20 degrees    Diagnosis Line Sinus rhythm with frequent Premature atrial complexes  Abnormal ECG  Confirmed by ISA REID MD ([id number]) on 7/26/2020 8:29:50 PM
< from: 12 Lead ECG (07.26.20 @ 11:16) >    Ventricular Rate 88 BPM    Atrial Rate 86 BPM    QRS Duration 76 ms    Q-T Interval 372 ms    QTC Calculation(Bezet) 450 ms    R Axis 69 degrees    T Axis 20 degrees    Diagnosis Line Sinus rhythm with frequent Premature atrial complexes  Abnormal ECG  Confirmed by ISA REID MD ([id number]) on 7/26/2020 8:29:50 PM
entricular Rate 88 BPM    Atrial Rate 86 BPM    QRS Duration 76 ms    Q-T Interval 372 ms    QTC Calculation(Bezet) 450 ms    R Axis 69 degrees    T Axis 20 degrees    Diagnosis Line Sinus rhythm with frequent Premature atrial complexes  Abnormal ECG  Confirmed by ISA REID MD ([id number]) on 7/26/2020 8:29:50 PM
ventricular Rate 88 BPM    Atrial Rate 86 BPM    QRS Duration 76 ms    Q-T Interval 372 ms    QTC Calculation(Bezet) 450 ms    R Axis 69 degrees    T Axis 20 degrees    Diagnosis Line Sinus rhythm with frequent Premature atrial complexes  Abnormal ECG  Confirmed by ISA REID MD ([id number]) on 7/26/2020 8:29:50 PM

## 2020-08-21 NOTE — PROGRESS NOTE BEHAVIORAL HEALTH - PROBLEM SELECTOR PROBLEM 1
Psychosis, paranoid

## 2020-08-21 NOTE — PROGRESS NOTE BEHAVIORAL HEALTH - PROBLEM SELECTOR PROBLEM 3
JOSR (generalized anxiety disorder)

## 2020-08-21 NOTE — PROGRESS NOTE BEHAVIORAL HEALTH - PROBLEM SELECTOR PLAN 4
pt not to have direct access to medication , need to be monitored.

## 2020-08-21 NOTE — PROGRESS NOTE BEHAVIORAL HEALTH - NSBHCHARTREVIEWLAB_PSY_A_CORE FT
Thyroid Stimulating Hormone, Serum: 1.65 uU/mL (08.08.20 @ 08:34)
10.7   11.10 )-----------( 307      ( 07 Aug 2020 02:58 )             32.7   08-07    141  |  109<H>  |  32<H>  ----------------------------<  91  3.9   |  26  |  1.27    Ca    9.9      07 Aug 2020 02:58    TPro  7.5  /  Alb  3.9  /  TBili  0.3  /  DBili  x   /  AST  18  /  ALT  35  /  AlkPhos  77  08-07
10.7   11.10 )-----------( 307      ( 07 Aug 2020 02:58 )             32.7   08-07    141  |  109<H>  |  32<H>  ----------------------------<  91  3.9   |  26  |  1.27    Ca    9.9      07 Aug 2020 02:58    TPro  7.5  /  Alb  3.9  /  TBili  0.3  /  DBili  x   /  AST  18  /  ALT  35  /  AlkPhos  77  08-07
Thyroid Stimulating Hormone, Serum: 1.65 uU/mL (08.08.20 @ 08:34)
10.7   11.10 )-----------( 307      ( 07 Aug 2020 02:58 )             32.7   08-07    141  |  109<H>  |  32<H>  ----------------------------<  91  3.9   |  26  |  1.27    Ca    9.9      07 Aug 2020 02:58    TPro  7.5  /  Alb  3.9  /  TBili  0.3  /  DBili  x   /  AST  18  /  ALT  35  /  AlkPhos  77  08-07
Thyroid Stimulating Hormone, Serum: 1.65 uU/mL (08.08.20 @ 08:34)
10.7   11.10 )-----------( 307      ( 07 Aug 2020 02:58 )             32.7   08-07    141  |  109<H>  |  32<H>  ----------------------------<  91  3.9   |  26  |  1.27    Ca    9.9      07 Aug 2020 02:58    TPro  7.5  /  Alb  3.9  /  TBili  0.3  /  DBili  x   /  AST  18  /  ALT  35  /  AlkPhos  77  08-07
Thyroid Stimulating Hormone, Serum: 1.65 uU/mL (08.08.20 @ 08:34)

## 2020-08-21 NOTE — PROGRESS NOTE BEHAVIORAL HEALTH - PROBLEM SELECTOR PROBLEM 2
Dementia with behavioral disturbance, unspecified dementia type

## 2020-08-21 NOTE — PROGRESS NOTE BEHAVIORAL HEALTH - NSBHATTESTSEENBY_PSY_A_CORE
attending Psychiatrist without NP/Trainee

## 2020-08-21 NOTE — PROGRESS NOTE BEHAVIORAL HEALTH - NSBHFUPINTERVALHXFT_PSY_A_CORE
Pt with euthymic mood and affect is mood congruent Pt with denial of any suicidal ideation intent or plan .  Pt is aware of her going to the [facility] for rehab  treatment. Pt with denial of any side effect from medication

## 2020-08-21 NOTE — PROGRESS NOTE BEHAVIORAL HEALTH - PROBLEM SELECTOR PROBLEM 4
Sedative, hypnotic, or anxiolytic withdrawal delirium, acute, mixed level of activity

## 2020-08-21 NOTE — PROGRESS NOTE BEHAVIORAL HEALTH - NSBHFUPINTERVALCCFT_PSY_A_CORE
"I would like to go home"
' I don't belong here "
"How long do I need to stay here"
"I look forward to getting out of here and getting the rehab started. "
"I would want to go home
"I was just tired today."
"I don't feel ready to go home, I much rather stay here"
"I need to continue with building  my strength so I can get around. "
"I'm only here as my daughters have been neglectful and tardy"
"I'm ready to go and start the PT"
"They did not even discuss with me what the colors the furniture was going to be"

## 2020-08-21 NOTE — PROGRESS NOTE BEHAVIORAL HEALTH - NSBHADMITMEDEDUDETAILS_A_CORE FT
Discussed risks./benefits/s-e

## 2020-08-21 NOTE — PROGRESS NOTE BEHAVIORAL HEALTH - NSBHCHARTREVIEWVS_PSY_A_CORE FT
Vital Signs Last 24 Hrs  T(C): 36.8 (19 Aug 2020 08:16), Max: 36.8 (19 Aug 2020 08:16)  T(F): 98.3 (19 Aug 2020 08:16), Max: 98.3 (19 Aug 2020 08:16)  HR: 79 (18 Aug 2020 20:30) (79 - 79)  BP: 140/66 (18 Aug 2020 20:30) (140/66 - 140/66)  BP(mean): --  RR: 16 (19 Aug 2020 08:16) (16 - 16)  SpO2: 99% (19 Aug 2020 08:16) (99% - 99%)
Vital Signs Last 24 Hrs  T(C): 36.8 (19 Aug 2020 08:16), Max: 36.8 (19 Aug 2020 08:16)  T(F): 98.3 (19 Aug 2020 08:16), Max: 98.3 (19 Aug 2020 08:16)  HR: 79 (18 Aug 2020 20:30) (79 - 79)  BP: 140/66 (18 Aug 2020 20:30) (140/66 - 140/66)  BP(mean): --  RR: 16 (19 Aug 2020 08:16) (16 - 16)  SpO2: 99% (19 Aug 2020 08:16) (99% - 99%)
Vital Signs Last 24 Hrs  T(C): --  T(F): --  HR: --  BP: --  BP(mean): --  RR: --  SpO2: --
Vital Signs Last 24 Hrs  T(C): 36.7 (17 Aug 2020 08:20), Max: 36.7 (17 Aug 2020 08:20)  T(F): 98.1 (17 Aug 2020 08:20), Max: 98.1 (17 Aug 2020 08:20)  HR: --  BP: --  BP(mean): --  RR: 16 (17 Aug 2020 08:20) (16 - 16)  SpO2: 99% (17 Aug 2020 08:20) (99% - 99%)
Vital Signs Last 24 Hrs  T(C): 36.6 (14 Aug 2020 08:16), Max: 36.6 (14 Aug 2020 08:16)  T(F): 97.9 (14 Aug 2020 08:16), Max: 97.9 (14 Aug 2020 08:16)  HR: --  BP: --  BP(mean): --  RR: 14 (14 Aug 2020 08:16) (14 - 14)  SpO2: 100% (14 Aug 2020 08:16) (100% - 100%)
Vital Signs Last 24 Hrs  T(C): 36.7 (08 Aug 2020 07:32), Max: 36.7 (08 Aug 2020 07:32)  T(F): 98.1 (08 Aug 2020 07:32), Max: 98.1 (08 Aug 2020 07:32)  HR: --  BP: --  BP(mean): --  RR: 16 (08 Aug 2020 07:32) (16 - 16)  SpO2: 98% (08 Aug 2020 07:32) (98% - 98%)
Vital Signs Last 24 Hrs  T(C): 37.1 (09 Aug 2020 08:07), Max: 37.1 (09 Aug 2020 08:07)  T(F): 98.7 (09 Aug 2020 08:07), Max: 98.7 (09 Aug 2020 08:07)  HR: --  BP: --  BP(mean): --  RR: 16 (09 Aug 2020 08:07) (16 - 16)  SpO2: 98% (09 Aug 2020 08:07) (98% - 98%)
Vital Signs Last 24 Hrs  T(C): 37.3 (10 Aug 2020 08:21), Max: 37.3 (10 Aug 2020 08:21)  T(F): 99.1 (10 Aug 2020 08:21), Max: 99.1 (10 Aug 2020 08:21)  HR: --  BP: --  BP(mean): --  RR: 116 (10 Aug 2020 08:21) (116 - 116)  SpO2: 98% (10 Aug 2020 08:21) (98% - 98%)
Vital Signs Last 24 Hrs  T(C): 36.8 (19 Aug 2020 08:16), Max: 36.8 (19 Aug 2020 08:16)  T(F): 98.3 (19 Aug 2020 08:16), Max: 98.3 (19 Aug 2020 08:16)  HR: 79 (18 Aug 2020 20:30) (79 - 79)  BP: 140/66 (18 Aug 2020 20:30) (140/66 - 140/66)  BP(mean): --  RR: 16 (19 Aug 2020 08:16) (16 - 16)  SpO2: 99% (19 Aug 2020 08:16) (99% - 99%)
Vital Signs Last 24 Hrs  T(C): 37.1 (07 Aug 2020 13:40), Max: 37.1 (07 Aug 2020 13:40)  T(F): 98.8 (07 Aug 2020 13:40), Max: 98.8 (07 Aug 2020 13:40)  HR: 81 (07 Aug 2020 13:40) (65 - 88)  BP: 148/78 (07 Aug 2020 13:40) (106/59 - 160/83)  BP(mean): --  RR: 18 (07 Aug 2020 13:40) (18 - 18)  SpO2: 97% (07 Aug 2020 13:40) (97% - 99%)
Vital Signs Last 24 Hrs  T(C): 36.7 (17 Aug 2020 08:20), Max: 36.7 (17 Aug 2020 08:20)  T(F): 98.1 (17 Aug 2020 08:20), Max: 98.1 (17 Aug 2020 08:20)  HR: --  BP: --  BP(mean): --  RR: 16 (17 Aug 2020 08:20) (16 - 16)  SpO2: 99% (17 Aug 2020 08:20) (99% - 99%)
Vital Signs Last 24 Hrs  T(C): 37 (13 Aug 2020 08:23), Max: 37 (13 Aug 2020 08:23)  T(F): 98.6 (13 Aug 2020 08:23), Max: 98.6 (13 Aug 2020 08:23)  HR: --  BP: --  BP(mean): --  RR: 16 (13 Aug 2020 08:23) (16 - 16)  SpO2: 98% (13 Aug 2020 08:23) (98% - 98%)
Vital Signs Last 24 Hrs  T(C): 37.1 (11 Aug 2020 08:23), Max: 37.1 (11 Aug 2020 08:23)  T(F): 98.8 (11 Aug 2020 08:23), Max: 98.8 (11 Aug 2020 08:23)  HR: 89 (10 Aug 2020 21:04) (89 - 89)  BP: 129/98 (10 Aug 2020 21:04) (129/98 - 129/98)  BP(mean): --  RR: 12 (11 Aug 2020 08:23) (12 - 18)  SpO2: 99% (11 Aug 2020 08:23) (99% - 99%)
Vital Signs Last 24 Hrs  T(C): --  T(F): --  HR: --  BP: --  BP(mean): --  RR: --  SpO2: --

## 2020-08-21 NOTE — PROGRESS NOTE BEHAVIORAL HEALTH - THOUGHT CONTENT
Delusions

## 2020-08-21 NOTE — PROGRESS NOTE BEHAVIORAL HEALTH - AXIS III
spinal stenosis, HTN

## 2020-08-21 NOTE — PROGRESS NOTE BEHAVIORAL HEALTH - SECONDARY DX3
Sedative, hypnotic, or anxiolytic withdrawal delirium, acute, mixed level of activity

## 2020-08-21 NOTE — PROGRESS NOTE BEHAVIORAL HEALTH - NS ED BHA REVIEW OF ED CHART AVAILABLE IMAGING REVIEWED
None available
Yes
Yes
None available
None available
Yes
None available

## 2020-08-21 NOTE — PROGRESS NOTE BEHAVIORAL HEALTH - RISK ASSESSMENT
low risk  Acute : Less preoccupied with any negative thoughts about herself. ,  decreased anxiety, still suspicious, can be redirected for short periods of time, slept well    mitigating: willing to take medication in the hospital , pt with better behavior with redirection, verbalized willing to return home  protective: family support   chronic: misuse of medications, possible over use of sedative hypnotics low risk  Acute : Less preoccupied with any negative thoughts about herself. ,  decreased anxiety, still suspicious , can be redirected for short periods of time, slept well    mitigating: willing to take medication in the hospital , pt with better behavior with redirection, verbalized willing to go to the [facility] for rehab to increase mobility .  protective: family support   chronic: misuse of medications, possible over use of sedative hypnotics

## 2020-08-21 NOTE — PROGRESS NOTE BEHAVIORAL HEALTH - RECENT MEMORY
Normal
Impaired
Normal
Impaired
Normal
Impaired

## 2020-08-21 NOTE — PROGRESS NOTE BEHAVIORAL HEALTH - BEHAVIOR
Post-Anesthesia Evaluation and Assessment    Patient: Max Hewitt MRN: [id number]  SSN: [id number]    YOB: 1966  Age: 46 y.o. Sex: female      Data from PACU flowsheet    Cardiovascular Function/Vital Signs  Visit Vitals    /61    Pulse 85    Temp 36.6 °C (97.9 °F)    Resp 15    Ht 5' 8\" (1.727 m)    Wt 63.5 kg (140 lb)    SpO2 96%    BMI 21.29 kg/m2       Patient is status post anesthesia    Nausea/Vomiting: controlled    Postoperative hydration reviewed and adequate. Pain:  Managed    Neurological Status: At baseline    Mental Status and Level of Consciousness: Alert and oriented     Pulmonary Status:   Adequate oxygenation and airway patent    Complications related to anesthesia: None    Post-anesthesia assessment completed.  No concerns    Signed By: Bola Carrion CRNA     November 14, 2017
Cooperative

## 2020-08-21 NOTE — PROGRESS NOTE BEHAVIORAL HEALTH - NSBHFUPMEDSE_PSY_A_CORE
Yes
Yes
None known
Yes
None known
None known
Yes
None known
None known

## 2020-08-21 NOTE — PROGRESS NOTE BEHAVIORAL HEALTH - NSBHADMITIPREASON_PSY_A_CORE
other reason

## 2020-08-21 NOTE — PROGRESS NOTE BEHAVIORAL HEALTH - NSBHADMITIPOBSFT_PSY_A_CORE
Routine admission

## 2020-08-22 NOTE — CHART NOTE - NSCHARTNOTEFT_GEN_A_CORE
Arrived at [facility] safely and is adjusting well according to staff member I spoke to.

## 2020-08-24 DIAGNOSIS — I10 ESSENTIAL (PRIMARY) HYPERTENSION: ICD-10-CM

## 2020-08-24 DIAGNOSIS — F03.91 UNSPECIFIED DEMENTIA WITH BEHAVIORAL DISTURBANCE: ICD-10-CM

## 2020-08-24 DIAGNOSIS — F13.231 SEDATIVE, HYPNOTIC OR ANXIOLYTIC DEPENDENCE WITH WITHDRAWAL DELIRIUM: ICD-10-CM
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
## 2020-08-24 DIAGNOSIS — M48.061 SPINAL STENOSIS, LUMBAR REGION WITHOUT NEUROGENIC CLAUDICATION: ICD-10-CM
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
## 2020-08-24 DIAGNOSIS — F41.1 GENERALIZED ANXIETY DISORDER: ICD-10-CM

## 2020-08-24 DIAGNOSIS — S80.812A ABRASION, LEFT LOWER LEG, INITIAL ENCOUNTER: ICD-10-CM

## 2020-08-24 DIAGNOSIS — M81.0 AGE-RELATED OSTEOPOROSIS WITHOUT CURRENT PATHOLOGICAL FRACTURE: ICD-10-CM

## 2020-08-24 DIAGNOSIS — G89.29 OTHER CHRONIC PAIN: ICD-10-CM

## 2020-08-24 DIAGNOSIS — Z87.311 PERSONAL HISTORY OF (HEALED) OTHER PATHOLOGICAL FRACTURE: ICD-10-CM
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
## 2020-08-24 DIAGNOSIS — F22 DELUSIONAL DISORDERS: ICD-10-CM

## 2020-08-24 DIAGNOSIS — E78.5 HYPERLIPIDEMIA, UNSPECIFIED: ICD-10-CM

## 2020-08-24 DIAGNOSIS — Z91.14 PATIENT'S OTHER NONCOMPLIANCE WITH MEDICATION REGIMEN: ICD-10-CM

## 2020-09-09 NOTE — PROGRESS NOTE BEHAVIORAL HEALTH - SUMMARY
Patient is an 80yo  female, , domiciled at [facility], with past psych hx of depression and anxiety, no prior psychiatric hospitalizations, with history of suicidal statement but no prior self-injurious behaviors or suicide attempt, was referred by [facility] and brought in by EMS for paranoia with agitation.   pt presents with increasing paranoid ideas and agitation at her facility. she is confused on interview. no evidence of suicidality. collateral from facility at this point in time is limited, pt should be held pending more detailed collateral from facility and family in order to formulate a plan for treatment of what appears to be worsening dementia with paranoia/behavioral disturbance.    RE-EVAL: 8.7.2020 - Patient symptoms presenting as an acute risk for harm to self / others secondary to paranoia, poor insight and judgment, ?in the setting of ?dementia. As per Dr. Moon, and daughter, patient does not have a diagnosis of Dementia. Hence. Patient presenting indicating a psychosis (paranoia). Patient's paranoia also exacerbating her Anxiety. Patient has recent active depressive symptoms secondary to being in [facility]. Patient has behavioral disturbance due to paranoia, of which would benefit from and requiring in-patient hospitalization for safety and stabilization.    Hospitalization course:  8/19/2020 pt with increased alertness as she was able to have better sleep yesrterday.  Pt with awareness of the plan for her to return to the [facility].  Pt needing to  have the covid test repeated tomorrow but the pt claims she had the test  today.   Nursing verified that  pt did not get the covid test today, will order for tomorrow.    8/18/2020 pt did not sleep at night, Risperdal and xanax was able to give sleep but needing to make medication adjustment of the time of the medications   8/17/2020 Pt is perseverating and forgetful, accusatory   8/14/2020 Pt remains with suspicious guardedness.   8/13/2020 pt with thoughts of going home if only the aides available. Pt with not wanting to see neurology  8/12/2020 Pt is forgetful often forgeting conversations and then becomes paranoid and accusatory   8/11/2020 Pt with decreasing anxiety , still able to sleep , and appetite is good  d/c the remeron and then the Risperdal is needed for suspicious thoughts. continue with the zoloft Yes Patient is an 78yo  female, , domiciled at [facility], with past psych hx of depression and anxiety, no prior psychiatric hospitalizations, with history of suicidal statement but no prior self-injurious behaviors or suicide attempt, was referred by [facility] and brought in by EMS for paranoia with agitation.   pt presents with increasing paranoid ideas and agitation at her facility. she is confused on interview. no evidence of suicidality. collateral from facility at this point in time is limited, pt should be held pending more detailed collateral from facility and family in order to formulate a plan for treatment of what appears to be worsening dementia with paranoia/behavioral disturbance.    RE-EVAL: 8.7.2020 - Patient symptoms presenting as an acute risk for harm to self / others secondary to paranoia, poor insight and judgment, ?in the setting of ?dementia. As per Dr. Moon, and daughter, patient does not have a diagnosis of Dementia. Hence. Patient presenting indicating a psychosis (paranoia). Patient's paranoia also exacerbating her Anxiety. Patient has recent active depressive symptoms secondary to being in [facility]. Patient has behavioral disturbance due to paranoia, of which would benefit from and requiring in-patient hospitalization for safety and stabilization.    Hospitalization course:  8/20/2020 Pt able to sleep and is aware of the plan for discharge to the [facility]  8/19/2020 pt with increased alertness as she was able to have better sleep yesrterday.  Pt with awareness of the plan for her to return to the [facility].  Pt needing to  have the covid test repeated tomorrow but the pt claims she had the test  today.   Nursing verified that  pt did not get the covid test today, will order for tomorrow.    8/18/2020 pt did not sleep at night, Risperdal and xanax was able to give sleep but needing to make medication adjustment of the time of the medications   8/17/2020 Pt is perseverating and forgetful, accusatory   8/14/2020 Pt remains with suspicious guardedness.   8/13/2020 pt with thoughts of going home if only the aides available. Pt with not wanting to see neurology  8/12/2020 Pt is forgetful often forgeting conversations and then becomes paranoid and accusatory   8/11/2020 Pt with decreasing anxiety , still able to sleep , and appetite is good  d/c the remeron and then the Risperdal is needed for suspicious thoughts. continue with the zoloft

## 2020-09-16 NOTE — BEHAVIORAL HEALTH ASSESSMENT NOTE - GAIT / STATION
Side rails/Explanation of exam/test/NPO/Position of comfort/Call bell/Fall precautions/Bedside visitors Other

## 2020-10-09 NOTE — ED BEHAVIORAL HEALTH ASSESSMENT NOTE - NS ED B[facility] BILLING ATTENDING WO NP TRAINEE
Is The Patient Presenting As Previously Scheduled?: Yes How Severe Is Your Rash?: mild Is This A New Presentation, Or A Follow-Up?: Rash Additional History: Patient is here for a rash under her left arm and in her groin. Patient states the rags under her arm is sometimes itchy and appears like ring worm. It has been there for several months. Patient has been using an otc treatment but it’s not helping. Patient denies any pain. The rash on her groin just appeared this week. She states it is bumpy, red and itchy. Patient denies shaving in this area and denies any pain. She denies any new products. [id number] Other

## 2020-10-13 NOTE — PROGRESS NOTE BEHAVIORAL HEALTH - ESTIMATED DISCHARGE DATE
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 Dr. Azar might want to see this patient. Thanks.   20-Aug-2020

## 2021-01-07 NOTE — ED BEHAVIORAL HEALTH ASSESSMENT NOTE - DESCRIPTION
DISASTER CHARTING    1/7/21, 10:38 AM EST    DISCHARGE ONGOING EVALUATION:     DYANA Mariusz Luke 1 day: 0  Location: [address] [place] [address]-A Reason for admit: Diarrhea [R19.7]   Barriers to Discharge: prepped for colonoscopy this am by Dr Kalyani Urena, findings: Polyp, diverticulosis and history of diarrhea. Advanced diet  PCP: Markus Jon MD  Patient Goals/Plan/Treatment Preferences: from home with spouse who is now hospitalized here. Denied HH or needs. 1/7/21, 10:41 AM EST    Patient goals/plan/ treatment preferences discussed by  and . Patient goals/plan/ treatment preferences reviewed with patient/ family. Patient/ family verbalize understanding of discharge plan and are in agreement with goal/plan/treatment preferences. Understanding was demonstrated using the teach back method. AVS provided by RN at time of discharge, which includes all necessary medical information pertaining to the patients current course of illness, treatment, post-discharge goals of care, and treatment preferences. Patient was calm and cooperative in the ED and did not exhibit any aggression. Patient did not require any PRN medications or any physical restraints. UTI As per HPI

## 2021-01-19 NOTE — PROGRESS NOTE BEHAVIORAL HEALTH - NSBHADMITNEWMED_PSY_A_CORE
Patient is Tuval[place]-speaking but said multiple times she does not need a .  She is speaking English in the room.    SUBJECTIVE:  This is a 24 year old female who presents to the Urgent Care with vaginal itching.  Patient just finished her menstrual cycle and over the past 24 hours she has had vaginal discharge, itch, and she feels swollen. She was told once that she has HPV, she is not sure if that is what is causing her symptoms or not.  No new sexual partners.  She is not concerned about STD, she denies gonorrhea and chlamydia testing today.     Past Medical History:  Problem list: Reviewed and updated.  Medications: Reviewed and updated.  Allergies: NKDA    Social history:  Patient does not use tobacco products.    OBJECTIVE:  VITALS: reviewed in chart  GENERAL: Alert, comfortable, not toxic, and in no acute distress  HEAD: Head is normocephalic without tics or tremors.   NEURO: Speech is appropriate and fluent.    LUNGS: Clear to ausculation, without any wheezing, crackles, or stridor.   HEART: Regular rate and rhythm, without murmurs, gallops, or rubs  GENITALS:  White thick discharge appreciated vaginal opening.  Otherwise no other external rash or swelling noted.     LABS:  Wet prep:  Negative for yeast, Trichomonas, clue cells    ASSESSMENT:  1) Vaginal itch and discharge - likely yeast infection    PLAN:  1) over the past 24 hours patient has developed vaginal itching and discharge.  She declined gonorrhea and chlamydia testing, stating that she is in a monogamous relationship.   Wet prep today was negative today.  However she had quite a bit of thick white discharge at the vaginal opening today, I am concerned this could be yeast.    Patient given Diflucan 150 mg tablet, she should take one dose today.  If she still has symptoms she can take the 2nd dose on January 23rd.  Otherwise she can use over-the-counter Monistat if needed.      Follow-up with PCP as needed.  She understands and 
agrees with this plan.    Leatha Graf PA-C  Working under the direct supervision of Dr Kush Chaves          
no

## 2021-03-24 NOTE — ED ADULT TRIAGE NOTE - ADDITIONAL SAFETY/BANDS...
TRANSFER - OUT REPORT:    Verbal report given to Stephen Montejo RN (name) on Orien Aase  being transferred to [facility](unit) for routine progression of care       Report consisted of patients Situation, Background, Assessment and   Recommendations(SBAR). Information from the following report(s) SBAR was reviewed with the receiving nurse. Lines:   Peripheral IV 03/24/21 Anterior;Proximal;Right Forearm (Active)        Opportunity for questions and clarification was provided.       Patient transported with:   Registered Nurse Additional Safety/Bands:

## 2021-04-15 NOTE — PATIENT PROFILE ADULT - HOME ACCESSIBILITY CONCERNS
Impression: Anatomical narrow angle, bilateral: H40.033. with patent PI and normal IOP OU Plan: Discussed diagnosis. Will continue to observe. none

## 2021-05-19 NOTE — BEHAVIORAL HEALTH ASSESSMENT NOTE - PROFESSIONAL COLLATERAL PHONE
[Patient Optimized for Surgery] : Patient optimized for surgery [No Further Testing Recommended] : no further testing recommended [As per surgery] : as per surgery [FreeTextEntry4] : 33 year old female  is here for medical clearance for an upcoming surgery for operative laparoscopy, fulgeration of endometriosis, removal of adhesions and hydrosalpinx and possible laparotomy.   All medical problems and medicines were documented and reviewed with the patient. \par Patient was counseled to stop any advil/alieve/aspirin 7 days prior to surgery and was advised to have nothing by mouth from 11 pm the night prior to surgery. \par Medications were reviewed with patient and patient was directed on which medications to be taken and not to be taken prior to surgery.\par Labs were reviewed with the patient.\par  [phone] / [phone]

## 2021-05-26 NOTE — PHYSICAL THERAPY INITIAL EVALUATION ADULT - TRANSFER SAFETY CONCERNS NOTED: SIT/STAND, REHAB EVAL
pending MBS
decreased proprioception/decreased safety awareness/losing balance/The pt was limited in sitting by progressive drifting to the left during prolonged sitting.

## 2021-07-09 NOTE — PROGRESS NOTE BEHAVIORAL HEALTH - AFFECT QUALITY
Consults by Sae Edwards MD at 2021  1:31 PM     Author: Sae Edwards MD Service: Infectious Disease Author Type: Physician    Filed: 2021  4:34 PM Date of Service: 2021  1:31 PM Status: Addendum    : Sae Edwards MD (Physician)    Related Notes: Original Note by Sae Edwards MD (Physician) filed at 2021  3:59 PM     Consult Orders    1. Inpatient consult to Infectious Diseases Reason for Consult? H/o resistant pseudomonas, spiking fever.; Consult priority: Today (routine); Communication for MD: No phone communication necessary for now [[id number]] ordered by Kim Knox MD at 21 1009               Consultation - Infectious Disease  Dayron Neri,  1954, MRN [id number]    Admitting Dx: Respiratory failure due to intracranial hemorrhage    PCP: Provider, No Primary Care, [phone]   Code status:  Full Code       Extended Emergency Contact Information  Primary Emergency Contact: HOLLY NERI  Home Phone: [phone]  Mobile Phone: [phone]  Relation: Spouse       ASSESSMENT   1. Fever, leukocytosis. Most likely from aspiration event , noted to cough up tube feeds. Chemical pneumonitis with likely infection. WBC 36k . Due to history of MDR pseudomonas will broaden coverage -- ceftolozane-tazobactam if available, plus flagyl for anaerobes.   2. 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 Has been on tobramycin nebs since  -- this may suppress pseudomonas from contributing to current infection.   3. 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   4. Treated for possible ventriculitis with 2 weeks broad spectrum antibiotics based on WBC in CSF in setting of blood in CSF. WBC/RBC ratios may not correlate well even in absence of infection due to blood being irritant to meninges and causing inflammation.   5. H/o ESBL klebsiella in sputum  Principal Problem:    SAH  (subarachnoid hemorrhage) (H)  Active Problems:    Acute respiratory failure with hypoxia (H)    ESBL (extended spectrum beta-lactamase) producing bacteria infection    Attention to tracheostomy (H)    Acute and chronic respiratory failure with hypoxia (H)    On mechanically assisted ventilation (H)    Encounter for palliative care    Encephalopathy    Metabolic encephalopathy    Idiopathic hypertension    Oropharyngeal dysphagia    Moderate protein-calorie malnutrition (H)    Pseudomonas aeruginosa infection    Acute tracheobronchitis    Cerebrovascular accident (CVA) due to occlusion of cerebral artery (H)       PLAN   Ceftolozane-tazobactam if available, plus flagyl  Respiratory culture through endotracheal aspirate  Follow respiratory and blood cultures. Can adjust/focus antibiotics based on cultures. If he remains febrile over the weekend add vancomycin pending cultures.   Please call on call provider over weekend if questions. We will see him again next week.     ADDENDUM: Zerbaxa not available -- plan ceftazidime/avibactam 2.5g q8h.     Sae Edwards MD  [facility]  [phone] [facility] paging  ______________________________________________________________________        Reason For Consult: Fever, history of MDR-pseudomonas     HPI    We have been requested by Dr. Knox to evaluate Dayron Neri who is a 66 y.o. year old male for the above. He has history of hypertension who presented to [facility] on [facility]21 for altered mental status, was found to have acute subarachnoid hemorrhage.  On 5/15, he underwent left external ventricular drain placement.  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 One the same day, he also underwent craniotomy with clipping of PCOM aneurysm.  Noted to have bilateral BAYLEE infarct and vasospasm. He was extubated on 6/3/2021.On 6/6/2021, patient was reintubated due to drop in his saturation.  On 6/7/2021, patient underwent  tracheostomy and PEG tube placement.  On 6/9/2021, EVD was removed.      Unasyn for possible pneumonia on 5/20/2021, switched to ceftriaxone for Klebsiella in sputum, then Zosyn 5/23 for pseudomonas in sputum.  Due to increased white blood cell count in bloody CSF, possibility of ventriculitis was raised, CSF cultures negative, but he was treated with 2 weeks of IV antibiotics, initially cefepime and vancomycin from 5/28/2021 but cefepime switched to meropenem as sputum grew ESBL.    He was transferred to [facility] on 6/11/21. Repeat sputum culture 6/19 showed MDR-pseudomonas (S only to aminoglycosides) and he was started on tobramycin nebulization 6/24.Tracheal secretions improved.      He has had follow up Head CTs here.     Last evening he had an episode of coughing up tube feeds, so these were held. Temp up to 102.7 shortly after that. WBC 36k today.     He is unable to provide history. His wife and daughter were on iPad and I discussed with them.        Medical History  See above Surgical History  See above Social History  Reviewed   Allergies  No Known Allergies Family History  family history not pertinent to presenting problem.    Psychosocial Needs  Social History     Social History Narrative   ? Not on file     Additional psychosocial needs reviewed per nursing assessment.       Prior to Admission Medications   Medications Prior to Admission   Medication Sig Dispense Refill Last Dose   ? acetaminophen (TYLENOL) 325 MG tablet 650 mg by Enteral Tube route every 4 (four) hours as needed for pain or fever. Maximum dose of acetaminophen from all sources is 75mg/kg/day, not to exceed 4000 mg/24 hours.   6/10/2021 at 2029   ? amino acids/protein hydrolys (PROSOURCE TF ENTERAL TUBE) 1 packet by Enteral Tube route 3 (three) times a day.   6/11/2021 at 0839   ? bisacodyL (DULCOLAX) 10 mg suppository Insert 10 mg into the rectum daily as needed for constipation.      ? famotidine (PEPCID) 20 MG tablet 20 mg by Enteral  Tube route 2 (two) times a day.   2021 at 0839   ? heparin sodium,porcine/PF (HEPARIN, PORCINE, PF,) 5,000 unit/0.5 mL Soln Inject 5,000 Units under the skin every 8 (eight) hours.   2021 at 0844   ? insulin aspart U-100 (NOVOLOG) 100 unit/mL (3 mL) injection pen Inject 1-12 Units under the skin every 4 (four) hours. Do not give correction Insulin if BG less than 140  For  - 164 give 1 unit.  For  - 189 give 2 units.  For  - 214 give 3 units.  For  - 239 give 4 units.  For  - 264 give 5 units.  For  - 289 give 6 units.  For  - 314 give 7 units.  For  - 339 give 8 units  For  - 364 give 9 units  For  - 389 give 10 units  For  - 414 give 11 units  For BG greater than or equal to 415 give 12 units  Check blood glucose Q4H and administer based on blood glucose.  Notify provider if glucose greater than or equal to 350 mg/dL after administration of correction dose.  If given at mealtime, administer within 30 minutes of start of meal   2021 at 0844   ? ipratropium-albuteroL (DUO-NEB) 0.5-2.5 mg/3 mL nebulizer Take 3 mL by nebulization every 6 (six) hours.    2021 at 0724   ? lisinopriL (PRINIVIL,ZESTRIL) 10 MG tablet 10 mg by Enteral Tube route daily.   2021 at 0839   ? [] meropenem (MERREM) 1 gram injection Infuse 2 g into a venous catheter every 8 (eight) hours. for 8 days   2021 at 0145   ? nystatin (MYCOSTATIN) 100,000 unit/mL suspension Apply 500,000 Units to the mouth or throat 4 (four) times a day.   2021 at 0838   ? potassium chloride (KLOR-CON) 20 mEq packet 20 mEq by Enteral Tube route 2 (two) times a day.   2021 at 0838   ? senna-docusate (PERICOLACE) 8.6-50 mg tablet 2 tablets by Enteral Tube route 2 (two) times a day.   2021 at 0839          Anti-infectives: pip/tazo -  Cephalexin -  Tobramycin nebs -  Last dose meropenem   Cultures:  blood pending   sputum MDR  pseudomonas  6/11 MRSA negative  Imaging: reviewed images          Review of Systems:  Unable to provide. Discussed with his nurse.  Physical Exam:  Temp:  [97.4  F (36.3  C)-102.8  F (39.3  C)] 99.6  F (37.6  C)  Heart Rate:  [] 85  Resp:  [16-38] 20  BP: ()/(56-78) 112/74  FiO2 (%):  [30 %-40 %] 40 %    GENERAL:  Lying in bed, opens eyes to voice and can track with eyes to command.   EYES: No conjunctival injection, pupils equally reactive to light  HEAD, EARS, NOSE, MOUTH, AND THROAT: mouth clear  Neck: trach in place, on trach mask  RESPIRATORY: Clear breath sounds anterior  CARDIOVASCULAR: Regular rate and rhythm, normal S1 and S2, no murmurs, rubs, or gallops.  ABDOMEN: Soft, nontender, no masses, +FT.  Normal bowel sounds.  MUSCULOSKELETAL: No synovitis. Hands in mitts.   LYMPHATIC: No lymphadenopathy.  SKIN/HAIR/NAILS: No rashes, no signs of peripheral emboli.  NEUROLOGIC: doesn't follow commands in extremities for me  Peripheral IV       Pertinent Labs    Results from last 7 days   Lab Units 07/09/21  0905 07/07/21  0626 07/05/21  0636   LN-WHITE BLOOD CELL COUNT thou/uL 36.9* 11.3* 10.5   LN-HEMOGLOBIN g/dL 10.3* 10.0* 10.5*   LN-HEMATOCRIT % 31.3* 30.8* 32.7*   LN-PLATELET COUNT thou/uL 266 258 325            Results from last 7 days   Lab Units 07/07/21  1527 07/05/21  1518 07/02/21  1519   LN-SODIUM mmol/L 138 139 137   LN-POTASSIUM mmol/L 4.3 4.5 4.3   LN-CHLORIDE mmol/L 98 99 100   LN-CO2 mmol/L 33* 28 26   LN-BLOOD UREA NITROGEN mg/dL 27* 28* 27*   LN-CREATININE mg/dL 0.59* 0.67* 0.63*   LN-CALCIUM mg/dL 10.7* 10.6* 10.3       Lab Results   Component Value Date    ALT 59 (H) 06/14/2021    AST 33 06/14/2021    ALKPHOS 125 (H) 06/14/2021    BILITOT 0.4 06/14/2021     procalcitonin 1.77       No results found for: CRP   No results found for: SEDRATE     Pertinent Radiology  Radiology Results: Reviewed  Eeg Awake And/or Drowsy (< 41 Min)    Result Date: 6/26/2021  EEG report DATE 06/26/21  CLINICAL HISTORY This is a 66 y.o. male with confusion. The EEG is done to look for underlying epilepsy. INTRODUCTION This is a routine EEG performed utilizing standard 10- 20 system of electrode placement with 20 channels of recording including one channel of EKG monitor. The patient was studied in awake and drowsy state. Photic stimulation was done. EEG TIME: 29 min DESCRIPTION OF THE RECORD The EEG background consists of a delta frequency background for the right cerebral hemisphere.  The left frontal cerebral hemisphere shows a theta frequency background with a delta frequency in the frontal region.  No convincing sharp activities to suggest electrographic seizures were seen during the recording.  No significant background change with photic stimulation.  Moderate background dysrhythmias is present. IMPRESSION/REPORT/PLAN This is an abnormal EEG during wakefulness and drowsiness.  A generalized slow background could be suggestive of an encephalopathy.  Focal slowness in the bifrontal region and generalized slowness in the right cerebral hemisphere could suggest cerebral dysfunction in these regions such as from a structural lesion.  No electrographic seizures were seen during the recording.  Further clinical correlation is needed. Please note that the absence of epileptiform abnormalities does not exclude the possibility of epilepsy in any patient.     Xr Chest 1 View Portable    Result Date: 7/9/2021  EXAM: XR CHEST 1 VIEW PORTABLE LOCATION: [facility] DATE/TIME: 7/9/2021 12:52 AM INDICATION: Coughing up tube feeding COMPARISON: 06/28/2021     Tracheostomy tube projects over tracheal air column. Increased discoid atelectasis at the left lung base. No other new focal consolidation. No pneumothorax or pleural effusion. Cardiac silhouette within normal limits. Stable tortuous aorta.    Xr Chest 1 View Portable    Result Date: 6/28/2021  EXAM: XR CHEST 1 VIEW PORTABLE LOCATION: [facility]  [facility] DATE/TIME: 6/28/2021 10:41 AM INDICATION: increased secretion COMPARISON: 06/18/2021     Tracheostomy tube in good position. Some stable minimal fibrosis left lower lung. Chest otherwise negative. No significant new findings.    Xr Chest 1 View Portable    Result Date: 6/18/2021  EXAM: XR CHEST 1 VIEW PORTABLE LOCATION: [facility] DATE/TIME: 6/18/2021 4:37 PM INDICATION: increased secretions COMPARISON: 06/14/2021, 06/09/2021     Tracheostomy tube in place. Left basilar opacities partially silhouetting the left hemidiaphragm are unchanged and again presumed to reflect some atelectasis. Right lung is clear. Heart and pulmonary vascularity are normal. Gastrostomy tube.    Xr Chest 1 View Portable    Result Date: 6/14/2021  EXAM: XR CHEST 1 VIEW PORTABLE LOCATION: [facility] DATE/TIME: 6/14/2021 1:43 PM INDICATION: resp failure, pneumonia COMPARISON: Portable AP view of the chest 6/9/2021     The tracheostomy projects within the tracheal air column. Trachea is midline and central airways are patent. The lungs are symmetrically inflated. Focal opacity in the medial left base is typical location for subsegmental atelectasis. No interstitial thickening or airspace opacities elsewhere in the lungs. No pleural fluid or pneumothorax.     Ct Head Without Contrast    Result Date: 7/1/2021  EXAM: CT HEAD WO CONTRAST LOCATION: [facility] DATE/TIME: 7/1/2021 11:06 AM INDICATION: Altered mental status not improving. COMPARISON: 06/16/2021 head CT. TECHNIQUE: Routine CT Head without IV contrast. Multiplanar reformats. Dose reduction techniques were used. FINDINGS: INTRACRANIAL CONTENTS: Since prior study, temporal evolution of multicompartment intracranial hemorrhage with near complete resolution of layering hemorrhage within the lateral ventricles and of extra-axial fluid deep to the right frontal craniotomy site. Some  dural thickening and/or hyperdense blood products persist extending to the right middle cranial fossa as before. No new or progressive hemorrhage. Low-density changes with sulcal effacement/mass effect redemonstrated in the right frontal lobe.  Suggested area of intraparenchymal hemorrhage on previous study appears to have resolved. The degree of mass effect overall is similar to prior study with overall stable/unchanged ventricular caliber, including dilation of the lateral and third ventricles. Volume loss in the right middle cranial fossa/right temporal lobe redemonstrated. Probable evolving subacute phase nonhemorrhagic ischemia in the right frontal lobe and paramedian posterior left frontal level. No definite new areas of acute ischemic involvement are identified. Underlying chronic ischemic changes are identified in the deep white matter of both cerebral hemispheres estimated mild to moderate. Position of cerebellar tonsils is satisfactory. Sella appears unremarkable. VISUALIZED ORBITS/SINUSES/MASTOIDS: No acute orbital process. Mild membrane thickening in the sphenoid sinus. No middle ear or mastoid effusion. BONES/SOFT TISSUES: Postoperative changes of right frontotemporal craniotomy. Right parasellar aneurysm clip. Mild motion artifact degrades image quality. No significant swelling of the facial or scalp tissues is evident.     1.  Temporal evolution and subtotal resolution of previously identified multicompartment hyperdense blood products. Specifically, subtotal resolution of intraparenchymal hemorrhage deep white matter right frontal lobe, intraventricular hemorrhage and of extra-axial blood products overlying the right frontotemporal level deep to the craniotomy site. No new or progressive hemorrhage is identified 2.  Temporal evolution of presumed subacute phase nonhemorrhagic ischemic change in the right frontal lobe and posterior left frontal paramedian region. No new or progressive areas of recent  ischemia are suggested. 3.  Stable ventricular caliber. 4.  Additional details above.    Ct Head Without Contrast    Result Date: 6/16/2021  EXAM: CT HEAD WO CONTRAST LOCATION: [facility] DATE/TIME: 6/16/2021 11:26 AM INDICATION: follow up on the size of the ventricles COMPARISON: CT 06/14/2021. TECHNIQUE: Routine CT Head without IV contrast. Multiplanar reformats. Dose reduction techniques were used. FINDINGS: INTRACRANIAL CONTENTS: Again seen are postop changes from right frontotemporal craniotomy with aneurysm clip in the right parasellar region. Areas of stable low-density change in the anterior right temporal lobe and base of the right frontal lobe. Stable  mixed density thin extra-axial fluid collection deep to the craniotomy flap. Again seen are small scattered areas of residual subarachnoid hemorrhage in the cerebral sulci. Again seen are small amounts of blood products layering in the occipital horns of the lateral ventricles. These subarachnoid and intraventricular blood products have decreased mildly from 06/14/2021 with no new subarachnoid or intraventricular hemorrhage. Again seen is a broad recent acute-subacute infarct along the anterior paramedian right frontal lobe with stable localized anterior mass effect. A trace of petechial blood products has developed in the center of this infarct since 06/14/2021. No space-occupying hemorrhage. Stable presumed subacute infarct along the paramedian left posterior frontal lobe near the vertex. Again seen is some edema associated with a previous left frontal shunt tube catheter tract with decreased density of the associated blood products in this region and the interval. Stable mild enlargement of the lateral and third ventricles from 06/14/2021. Stable midline and normal caliber fourth ventricle. VISUALIZED ORBITS/SINUSES/MASTOIDS: No intraorbital abnormality. Mild mucosal thickening scattered about the paranasal sinuses. No middle  ear or mastoid effusion. BONES/SOFT TISSUES: No acute abnormality.     1.  No significant change in the size of the mildly enlarged lateral and third ventricles from CT 06/14/2021. Slight decrease in the intraventricular hemorrhage in the occipital horns in the interval. 2.  Again seen is a broad area of evolving infarction involving the anterior paramedian right frontal lobe similar in size to the CT 06/14/2021. A small amount of petechial blood products have developed in the central aspect of this infarct since 06/14/2021. No space-occupying hemorrhage. Continued follow-up recommended. 3.  Stable small subacute infarct along the paramedian superior left frontal lobe posteriorly. 4.  Again seen are low density changes associated with a previous left frontal approach ventricular catheter track with decrease in the density of blood products along the catheter track in the interval. 5.  Slight decrease in the scattered subarachnoid hemorrhage in the cerebral sulci in the interval. No definite new subarachnoid hemorrhage. 6.  Stable postop changes right frontotemporal craniotomy with aneurysm clip in the right parasellar region with stable mixed density extra-axial fluid and blood products deep to the craniectomy site.    Ct Head Without Contrast    Result Date: 6/14/2021  EXAM: CT HEAD WO CONTRAST LOCATION: [facility] DATE/TIME: 6/14/2021 1:44 PM INDICATION: Follow-up intracranial hemorrhage. Lethargy. COMPARISON: CT head 06/11/2021 TECHNIQUE: Routine CT Head without IV contrast. Multiplanar reformats. Dose reduction techniques were used. FINDINGS: INTRACRANIAL CONTENTS: Right frontoparietal and pterional craniotomy changes with right paraclinoid aneurysm clip as seen previously. Slight interval decrease in residual mixed attenuation extra-axial fluid and blood products deep to the craniotomy flap which currently measures up to C6-C7 millimeters in maximum radial thickness compared to 8 mm  previously along the anterior-inferior right frontal lobe. Small volume subarachnoid hemorrhage remains within the posterior most aspect of the left sylvian fissure and adjacent parietal sulci, decreased since the previous exam. Small volume residual intraventricular hemorrhage layering within the temporal horns bilaterally. No definite new hemorrhage or enlarging extra-axial collection. Subacute infarct of the anterior parasagittal right frontal lobe. Small amount of parenchymal blood products along an old left frontal catheter tract, decreased since the prior exam. Mild lateral and third ventriculomegaly, slightly increased since the prior exam. For example, transverse diameter of the frontal horns measures up to 4 cm compared to 3.8 cm previously. VISUALIZED ORBITS/SINUSES/MASTOIDS: No intraorbital abnormality. Layering partially aerated secretions within the bilateral sphenoid sinuses, similar to the prior exam. Minor mucosal thickening inferior right maxillary sinus. No middle ear or mastoid effusion. BONES/SOFT TISSUES: No new calvarial defect.     1.  Slight interval increase in the caliber of the lateral and third ventricles without evidence for outflow obstruction. Developing communicating hydrocephalus is possible. Follow-up is advised. 2.  Multi compartmental intracranial hemorrhage with decreasing conspicuity of hyperattenuating blood products compared to 06/11/2021. No new hemorrhage or enlarging extra-axial collection identified. 3.  Subacute infarct of the parasagittal right frontal lobe.    Ct External Imaging Head    Result Date: 6/11/2021  Images were obtained from an external facility.  Click [employer] Images hyperlink to view images.  Textual results have been scanned into the media tab.    Ct External Imaging Head    Result Date: 6/11/2021  Images were obtained from an external facility.  Click [facility] Images hyperlink to view images.  Textual results have been scanned into the media tab.    Xr External  Imaging    Result Date: 6/11/2021  Images were obtained from an external facility.  Click PACS Images hyperlink to view images.  Textual results have been scanned into the media tab.    Us Scrotum And Testicles W Duplex Ltd    Result Date: 6/22/2021  EXAM: US SCROTUM AND TESTICLES WITH DUPLEX LIMITED LOCATION: [facility] DATE/TIME: 6/22/2021 12:41 PM INDICATION: Enlarged left scrotum. COMPARISON: None available. TECHNIQUE: Ultrasound of scrotum with color flow and spectral Doppler with waveform analysis performed. FINDINGS: RIGHT: Right testicle measures 4.7 x 2.2 x 2.4 cm. Normal testicle with no masses. Normal arterial duplex and normal color flow. 4 mm cyst right epididymal head. No hydrocele. No varicocele. LEFT: Left testicle measures 4.9 x 2.6 x 3.2 cm. Normal testicle with no masses. Normal arterial duplex and normal color flow. Normal epididymis. No hydrocele. No varicocele. Large left inguinal hernia containing mesenteric fat and bowel, extending into the left superior scrotum.     1.  Large left inguinal hernia containing mesenteric fat and bowel, extending to the superior scrotum. 2.  No evidence of intratesticular mass. 3.  Incidental tiny right epididymal head cyst.    Xr External Imaging Chest    Result Date: 6/11/2021  Images were obtained from an external facility.  Click PACS Images hyperlink to view images.  Textual results have been scanned into the media tab.                  Depressed

## 2021-08-05 NOTE — ED STATDOCS - GASTROINTESTINAL, MLM
abdomen soft, non-tender, and non-distended. Bowel sounds present. Cimzia Pregnancy And Lactation Text: This medication crosses the placenta but can be considered safe in certain situations. Cimzia may be excreted in breast milk.

## 2022-01-13 NOTE — ED STATDOCS - NS ED ATTENDING STATEMENT MOD
I have personally performed a face to face diagnostic evaluation on this patient. I have reviewed the ACP note and agree with the history, exam and plan of care, except as noted.
10

## 2022-02-22 NOTE — ED ADULT NURSE NOTE - CAS DISCH CONDITION
[facility] is requesting a new Rx for rosuvastatin (CRESTOR) 20 MG Tab as a 100-Day Supply to be sent to the pharmacy. Please advise if appropriate.    
Stable
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
## 2022-07-23 NOTE — ED STATDOCS - DATA REVIEWED, MDM
Impression: S/P LASIK over IOL OD - 1 Day. Encounter for surgical aftercare following surgery on a sense organ  Z48.810. Plan: Clear and centered flap OD. Patient comfortable. AT's q2h this weekend in addition to medicated drops as directed. nurses notes/vital signs

## 2022-08-08 NOTE — PROGRESS NOTE BEHAVIORAL HEALTH - NSBHADMITIPOBS_PSY_A_CORE
Routine observation
Universal Safety Interventions
Routine observation

## 2023-01-25 NOTE — PATIENT PROFILE ADULT - DO YOU FEEL UNSAFE AT HOME, WORK, OR SCHOOL?
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  Intubated 12/27 for airway protection.  S/p Trach/PEG 1/3  Surgicel removed 1/4.  Sutures removed 1/10  Both trach and PEG sites C/D/I  Pending discharge to [facility] once Covid isolation completed; tested positive 1/17    Maintain gauze/drain sponge between trach flange and skin to prevent skin breakdown. Change as needed.  Continue trach care and suctioning.   Continue tube feeds as tolerated.   Thoracic surgery to continue to follow.    Plan discussed with Dr. Hernandez during AM rounds. no

## 2023-01-30 NOTE — PROGRESS NOTE BEHAVIORAL HEALTH - SECONDARY DX1
JOSR (generalized anxiety disorder)
JOSR (generalized anxiety disorder)
No
JOSR (generalized anxiety disorder)
JOSR (generalized anxiety disorder)
Dementia with behavioral disturbance
JOSR (generalized anxiety disorder)
Dementia with behavioral disturbance
Dementia with behavioral disturbance, unspecified dementia type
JOSR (generalized anxiety disorder)
Dementia with behavioral disturbance
JOSR (generalized anxiety disorder)
Dementia with behavioral disturbance

## 2023-06-23 NOTE — ED BEHAVIORAL HEALTH ASSESSMENT NOTE - SUICIDE PROTECTIVE FACTORS
Patent
Responsibility to family and others/Supportive social network of family or friends/Identifies reasons for living/Has future plans

## 2023-09-06 NOTE — ED ADULT TRIAGE NOTE - HEIGHT IN CM
162.56
This patient has been assessed with a concern for Malnutrition and was treated during this hospitalization for the following Nutrition diagnosis/diagnoses:     -  09/05/2023: Moderate protein-calorie malnutrition

## 2024-06-10 NOTE — PROGRESS NOTE BEHAVIORAL HEALTH - NSBHPTASSESSDT_PSY_A_CORE
28-Jul-2020 15:32
29-Jul-2020 11:20
Pt has decreased responsiveness. Pt slow to open eyes.    Pt remains on bipap 12/6 with FiO2 increased to 100%  
PAIN
30-Jul-2020 10:38

## 2024-07-03 NOTE — PROGRESS NOTE BEHAVIORAL HEALTH - DETAILS
Date of last visit:  4/9/2024  Date of next visit:  8/19/2024    Requested Prescriptions     Signed Prescriptions Disp Refills    levoFLOXacin (LEVAQUIN) 500 MG tablet 5 tablet 0     Sig: Take 1 tablet by mouth daily for 5 days     Authorizing Provider: KRISTIN NUNO     Ordering User: GATITO MENDOZA    predniSONE (DELTASONE) 20 MG tablet 12 tablet 0     Sig: Take 1 tablet, PO BID x 4 days then daily x 4 days     Authorizing Provider: KRISTIN NUNO     Ordering User: GATITO MENDOZA informed via Phone.    HTN SBernardStenosis RANI

## 2025-03-05 NOTE — BEHAVIORAL HEALTH ASSESSMENT NOTE - NS ED BHA COCAINE
Acute issue of elevated HBA1c and Discussed decreasing bad carbohydrates, specifically sweets, breads, potatoes, corn and high caloric drinks (juices, sodas, sweet tea).  Also recommend increasing physical activity, ideally 150 minutes aerobic exercise weekly and resistance exercises 2-3x/week.         None known

## 2025-06-05 NOTE — PATIENT PROFILE ADULT - VISION (WITH CORRECTIVE LENSES IF THE PATIENT USUALLY WEARS THEM):
Name of Medication(s) Requested:  Requested Prescriptions     Pending Prescriptions Disp Refills    furosemide (LASIX) 20 MG tablet 90 tablet 0     Sig: Take 1 tablet by mouth daily       Medication is on current medication list Yes    Dosage and directions were verified? Yes    Quantity verified: 90 day supply     Pharmacy Verified?  Yes      Last Appointment:  12/20/2024    Future appts:  Future Appointments   Date Time Provider Department Center   6/13/2025  9:00 AM Eunice Block MD [facility] DE[facility]   10/8/2025  7:45 AM Matt Aburto, DO [facility]        (If no appt send self scheduling link. .REFILLAPPT)  Scheduling request sent?     [] Yes  [x] No    Does patient need updated?  [] Yes  [x] No    
Normal vision: sees adequately in most situations; can see medication labels, newsprint

## 2025-06-10 NOTE — ED STATDOCS - PMH
Chronic back pain, unspecified back location, unspecified back pain laterality    Closed compression fracture of second lumbar vertebra, sequela    Hyperlipidemia, unspecified hyperlipidemia type    Hypertension, unspecified type    Spinal stenosis of lumbar region, unspecified whether neurogenic claudication present negative